# Patient Record
Sex: FEMALE | Race: BLACK OR AFRICAN AMERICAN | Employment: UNEMPLOYED | URBAN - METROPOLITAN AREA
[De-identification: names, ages, dates, MRNs, and addresses within clinical notes are randomized per-mention and may not be internally consistent; named-entity substitution may affect disease eponyms.]

---

## 2017-05-01 ENCOUNTER — APPOINTMENT (OUTPATIENT)
Dept: GENERAL RADIOLOGY | Age: 62
DRG: 100 | End: 2017-05-01
Attending: EMERGENCY MEDICINE
Payer: SELF-PAY

## 2017-05-01 ENCOUNTER — APPOINTMENT (OUTPATIENT)
Dept: CT IMAGING | Age: 62
DRG: 100 | End: 2017-05-01
Attending: EMERGENCY MEDICINE
Payer: SELF-PAY

## 2017-05-01 ENCOUNTER — HOSPITAL ENCOUNTER (INPATIENT)
Age: 62
LOS: 7 days | Discharge: HOME HEALTH | DRG: 100 | End: 2017-05-08
Attending: EMERGENCY MEDICINE | Admitting: HOSPITALIST
Payer: SELF-PAY

## 2017-05-01 DIAGNOSIS — G40.311 INTRACTABLE GENERALIZED IDIOPATHIC EPILEPSY WITH STATUS EPILEPTICUS (HCC): Primary | ICD-10-CM

## 2017-05-01 DIAGNOSIS — E87.20 METABOLIC ACIDOSIS: ICD-10-CM

## 2017-05-01 PROBLEM — J96.00 ACUTE RESPIRATORY FAILURE (HCC): Status: ACTIVE | Noted: 2017-05-01

## 2017-05-01 PROBLEM — G40.901 STATUS EPILEPTICUS (HCC): Status: ACTIVE | Noted: 2017-05-01

## 2017-05-01 LAB
ALBUMIN SERPL BCP-MCNC: 3.5 G/DL (ref 3.4–5)
ALBUMIN/GLOB SERPL: 0.6 {RATIO} (ref 0.8–1.7)
ALP SERPL-CCNC: 115 U/L (ref 45–117)
ALT SERPL-CCNC: 89 U/L (ref 13–56)
AMPHET UR QL SCN: NEGATIVE
ANION GAP BLD CALC-SCNC: 26 MMOL/L (ref 3–18)
ANION GAP BLD CALC-SCNC: 27 MMOL/L (ref 10–20)
ANION GAP BLD CALC-SCNC: 6 MMOL/L (ref 3–18)
APPEARANCE UR: CLEAR
APTT PPP: 23.5 SEC (ref 23–36.4)
ARTERIAL PATENCY WRIST A: YES
ARTERIAL PATENCY WRIST A: YES
AST SERPL W P-5'-P-CCNC: 87 U/L (ref 15–37)
ATRIAL RATE: 115 BPM
BACTERIA URNS QL MICRO: NEGATIVE /HPF
BARBITURATES UR QL SCN: NEGATIVE
BASE DEFICIT BLD-SCNC: 1 MMOL/L
BASE DEFICIT BLD-SCNC: 22 MMOL/L
BASOPHILS # BLD AUTO: 0 K/UL (ref 0–0.06)
BASOPHILS # BLD: 0 % (ref 0–2)
BDY SITE: ABNORMAL
BDY SITE: ABNORMAL
BENZODIAZ UR QL: POSITIVE
BILIRUB SERPL-MCNC: 0.3 MG/DL (ref 0.2–1)
BILIRUB UR QL: NEGATIVE
BODY TEMPERATURE: 37.4
BODY TEMPERATURE: 99.1
BUN BLD-MCNC: 7 MG/DL (ref 7–18)
BUN SERPL-MCNC: 6 MG/DL (ref 7–18)
BUN SERPL-MCNC: 7 MG/DL (ref 7–18)
BUN/CREAT SERPL: 4 (ref 12–20)
BUN/CREAT SERPL: 5 (ref 12–20)
CA-I BLD-MCNC: 1.09 MMOL/L (ref 1.12–1.32)
CALCIUM SERPL-MCNC: 7.2 MG/DL (ref 8.5–10.1)
CALCIUM SERPL-MCNC: 9.4 MG/DL (ref 8.5–10.1)
CALCULATED P AXIS, ECG09: 54 DEGREES
CALCULATED R AXIS, ECG10: 2 DEGREES
CALCULATED T AXIS, ECG11: 72 DEGREES
CANNABINOIDS UR QL SCN: POSITIVE
CHLORIDE BLD-SCNC: 106 MMOL/L (ref 100–108)
CHLORIDE SERPL-SCNC: 106 MMOL/L (ref 100–108)
CHLORIDE SERPL-SCNC: 109 MMOL/L (ref 100–108)
CK MB CFR SERPL CALC: 0.7 % (ref 0–4)
CK MB CFR SERPL CALC: 1.1 % (ref 0–4)
CK MB SERPL-MCNC: 1.6 NG/ML (ref 5–25)
CK MB SERPL-MCNC: 3.8 NG/ML (ref 5–25)
CK SERPL-CCNC: 214 U/L (ref 26–192)
CK SERPL-CCNC: 351 U/L (ref 26–192)
CO2 BLD-SCNC: 12 MMOL/L (ref 19–24)
CO2 SERPL-SCNC: 21 MMOL/L (ref 21–32)
CO2 SERPL-SCNC: 9 MMOL/L (ref 21–32)
COCAINE UR QL SCN: NEGATIVE
COLOR UR: YELLOW
CREAT SERPL-MCNC: 1.28 MG/DL (ref 0.6–1.3)
CREAT SERPL-MCNC: 1.34 MG/DL (ref 0.6–1.3)
CREAT UR-MCNC: 0.9 MG/DL (ref 0.6–1.3)
DIAGNOSIS, 93000: NORMAL
DIFFERENTIAL METHOD BLD: ABNORMAL
EOSINOPHIL # BLD: 0.1 K/UL (ref 0–0.4)
EOSINOPHIL NFR BLD: 1 % (ref 0–5)
ERYTHROCYTE [DISTWIDTH] IN BLOOD BY AUTOMATED COUNT: 14.2 % (ref 11.6–14.5)
EST. AVERAGE GLUCOSE BLD GHB EST-MCNC: 105 MG/DL
GAS FLOW.O2 O2 DELIVERY SYS: ABNORMAL L/MIN
GAS FLOW.O2 O2 DELIVERY SYS: ABNORMAL L/MIN
GAS FLOW.O2 SETTING OXYMISER: 10 L/M
GAS FLOW.O2 SETTING OXYMISER: 16 BPM
GLOBULIN SER CALC-MCNC: 5.5 G/DL (ref 2–4)
GLUCOSE BLD STRIP.AUTO-MCNC: 106 MG/DL (ref 70–110)
GLUCOSE BLD STRIP.AUTO-MCNC: 294 MG/DL (ref 74–106)
GLUCOSE BLD STRIP.AUTO-MCNC: 95 MG/DL (ref 70–110)
GLUCOSE SERPL-MCNC: 279 MG/DL (ref 74–99)
GLUCOSE SERPL-MCNC: 659 MG/DL (ref 74–99)
GLUCOSE UR STRIP.AUTO-MCNC: 250 MG/DL
HBA1C MFR BLD: 5.3 % (ref 4.2–5.6)
HCO3 BLD-SCNC: 24.3 MMOL/L (ref 22–26)
HCO3 BLD-SCNC: 7.6 MMOL/L (ref 22–26)
HCT VFR BLD AUTO: 47.2 % (ref 35–45)
HCT VFR BLD CALC: 52 % (ref 36–49)
HDSCOM,HDSCOM: ABNORMAL
HGB BLD-MCNC: 15.5 G/DL (ref 12–16)
HGB BLD-MCNC: 17.7 G/DL (ref 12–16)
HGB UR QL STRIP: ABNORMAL
HYALINE CASTS URNS QL MICRO: ABNORMAL /LPF (ref 0–2)
INR PPP: 1.2 (ref 0.8–1.2)
INSPIRATION.DURATION SETTING TIME VENT: 0.75 SEC
KETONES UR QL STRIP.AUTO: NEGATIVE MG/DL
LACTATE SERPL-SCNC: 2.5 MMOL/L (ref 0.4–2)
LACTATE SERPL-SCNC: 3.3 MMOL/L (ref 0.4–2)
LEUKOCYTE ESTERASE UR QL STRIP.AUTO: NEGATIVE
LYMPHOCYTES # BLD AUTO: 34 % (ref 21–52)
LYMPHOCYTES # BLD: 3.1 K/UL (ref 0.9–3.6)
MAGNESIUM SERPL-MCNC: 2.5 MG/DL (ref 1.6–2.6)
MCH RBC QN AUTO: 31.8 PG (ref 24–34)
MCHC RBC AUTO-ENTMCNC: 32.8 G/DL (ref 31–37)
MCV RBC AUTO: 96.9 FL (ref 74–97)
METHADONE UR QL: NEGATIVE
MONOCYTES # BLD: 0.3 K/UL (ref 0.05–1.2)
MONOCYTES NFR BLD AUTO: 3 % (ref 3–10)
MUCOUS THREADS URNS QL MICRO: ABNORMAL /LPF
NEUTS SEG # BLD: 5.5 K/UL (ref 1.8–8)
NEUTS SEG NFR BLD AUTO: 62 % (ref 40–73)
NITRITE UR QL STRIP.AUTO: NEGATIVE
O2/TOTAL GAS SETTING VFR VENT: 1 %
O2/TOTAL GAS SETTING VFR VENT: 1 %
OPIATES UR QL: POSITIVE
P-R INTERVAL, ECG05: 124 MS
PCO2 BLD: 25.8 MMHG (ref 35–45)
PCO2 BLD: 41.2 MMHG (ref 35–45)
PCP UR QL: NEGATIVE
PEEP RESPIRATORY: 7 CMH2O
PH BLD: 7.08 [PH] (ref 7.35–7.45)
PH BLD: 7.38 [PH] (ref 7.35–7.45)
PH UR STRIP: 5.5 [PH] (ref 5–8)
PIP ISTAT,IPIP: 23
PLATELET # BLD AUTO: 170 K/UL (ref 135–420)
PMV BLD AUTO: 10.3 FL (ref 9.2–11.8)
PO2 BLD: 214 MMHG (ref 80–100)
PO2 BLD: 451 MMHG (ref 80–100)
POTASSIUM BLD-SCNC: 3.9 MMOL/L (ref 3.5–5.5)
POTASSIUM SERPL-SCNC: 3.9 MMOL/L (ref 3.5–5.5)
POTASSIUM SERPL-SCNC: 6.2 MMOL/L (ref 3.5–5.5)
PROT SERPL-MCNC: 9 G/DL (ref 6.4–8.2)
PROT UR STRIP-MCNC: 30 MG/DL
PROTHROMBIN TIME: 14.7 SEC (ref 11.5–15.2)
Q-T INTERVAL, ECG07: 354 MS
QRS DURATION, ECG06: 80 MS
QTC CALCULATION (BEZET), ECG08: 489 MS
RBC # BLD AUTO: 4.87 M/UL (ref 4.2–5.3)
RBC #/AREA URNS HPF: ABNORMAL /HPF (ref 0–5)
SALICYLATES SERPL-MCNC: 7.7 MG/DL (ref 2.8–20)
SAO2 % BLD: 100 % (ref 92–97)
SAO2 % BLD: 99 % (ref 92–97)
SERVICE CMNT-IMP: ABNORMAL
SERVICE CMNT-IMP: ABNORMAL
SODIUM BLD-SCNC: 140 MMOL/L (ref 136–145)
SODIUM SERPL-SCNC: 136 MMOL/L (ref 136–145)
SODIUM SERPL-SCNC: 141 MMOL/L (ref 136–145)
SP GR UR REFRACTOMETRY: 1.01 (ref 1–1.03)
SPECIMEN TYPE: ABNORMAL
SPECIMEN TYPE: ABNORMAL
TOTAL RESP. RATE, ITRR: 16
TOTAL RESP. RATE, ITRR: 21
TROPONIN I BLD-MCNC: <0.04 NG/ML (ref 0–0.08)
TROPONIN I SERPL-MCNC: 0.04 NG/ML (ref 0–0.04)
TROPONIN I SERPL-MCNC: 1.03 NG/ML (ref 0–0.04)
TSH SERPL DL<=0.05 MIU/L-ACNC: 1.36 UIU/ML (ref 0.36–3.74)
UROBILINOGEN UR QL STRIP.AUTO: 0.2 EU/DL (ref 0.2–1)
VENTILATION MODE VENT: ABNORMAL
VENTRICULAR RATE, ECG03: 115 BPM
VOLUME CONTROL PLUS IVLCP: YES
VT SETTING VENT: 400 ML
WBC # BLD AUTO: 9.1 K/UL (ref 4.6–13.2)
WBC URNS QL MICRO: ABNORMAL /HPF (ref 0–4)
YEAST URNS QL MICRO: ABNORMAL

## 2017-05-01 PROCEDURE — 82962 GLUCOSE BLOOD TEST: CPT

## 2017-05-01 PROCEDURE — 74011250636 HC RX REV CODE- 250/636

## 2017-05-01 PROCEDURE — 83605 ASSAY OF LACTIC ACID: CPT | Performed by: PHYSICIAN ASSISTANT

## 2017-05-01 PROCEDURE — 80307 DRUG TEST PRSMV CHEM ANLYZR: CPT | Performed by: EMERGENCY MEDICINE

## 2017-05-01 PROCEDURE — 74011250636 HC RX REV CODE- 250/636: Performed by: EMERGENCY MEDICINE

## 2017-05-01 PROCEDURE — 77030020263 HC SOL INJ SOD CL0.9% LFCR 1000ML

## 2017-05-01 PROCEDURE — 99285 EMERGENCY DEPT VISIT HI MDM: CPT

## 2017-05-01 PROCEDURE — 36415 COLL VENOUS BLD VENIPUNCTURE: CPT | Performed by: PHYSICIAN ASSISTANT

## 2017-05-01 PROCEDURE — 85025 COMPLETE CBC W/AUTO DIFF WBC: CPT | Performed by: EMERGENCY MEDICINE

## 2017-05-01 PROCEDURE — 71010 XR CHEST PORT: CPT

## 2017-05-01 PROCEDURE — 51702 INSERT TEMP BLADDER CATH: CPT

## 2017-05-01 PROCEDURE — 5A1945Z RESPIRATORY VENTILATION, 24-96 CONSECUTIVE HOURS: ICD-10-PCS | Performed by: EMERGENCY MEDICINE

## 2017-05-01 PROCEDURE — 87070 CULTURE OTHR SPECIMN AEROBIC: CPT | Performed by: PHYSICIAN ASSISTANT

## 2017-05-01 PROCEDURE — 87077 CULTURE AEROBIC IDENTIFY: CPT | Performed by: PHYSICIAN ASSISTANT

## 2017-05-01 PROCEDURE — 83735 ASSAY OF MAGNESIUM: CPT | Performed by: EMERGENCY MEDICINE

## 2017-05-01 PROCEDURE — 36600 WITHDRAWAL OF ARTERIAL BLOOD: CPT

## 2017-05-01 PROCEDURE — 74011250636 HC RX REV CODE- 250/636: Performed by: HOSPITALIST

## 2017-05-01 PROCEDURE — 80047 BASIC METABLC PNL IONIZED CA: CPT

## 2017-05-01 PROCEDURE — 96374 THER/PROPH/DIAG INJ IV PUSH: CPT

## 2017-05-01 PROCEDURE — 77030032764 HC GLDSCP STAT GVL VERT -B

## 2017-05-01 PROCEDURE — 96367 TX/PROPH/DG ADDL SEQ IV INF: CPT

## 2017-05-01 PROCEDURE — 93005 ELECTROCARDIOGRAM TRACING: CPT

## 2017-05-01 PROCEDURE — 80177 DRUG SCRN QUAN LEVETIRACETAM: CPT | Performed by: EMERGENCY MEDICINE

## 2017-05-01 PROCEDURE — 87040 BLOOD CULTURE FOR BACTERIA: CPT | Performed by: INTERNAL MEDICINE

## 2017-05-01 PROCEDURE — 84484 ASSAY OF TROPONIN QUANT: CPT | Performed by: EMERGENCY MEDICINE

## 2017-05-01 PROCEDURE — 74011000250 HC RX REV CODE- 250

## 2017-05-01 PROCEDURE — 87086 URINE CULTURE/COLONY COUNT: CPT | Performed by: PHYSICIAN ASSISTANT

## 2017-05-01 PROCEDURE — 82803 BLOOD GASES ANY COMBINATION: CPT

## 2017-05-01 PROCEDURE — 82550 ASSAY OF CK (CPK): CPT | Performed by: EMERGENCY MEDICINE

## 2017-05-01 PROCEDURE — 65610000006 HC RM INTENSIVE CARE

## 2017-05-01 PROCEDURE — 31500 INSERT EMERGENCY AIRWAY: CPT

## 2017-05-01 PROCEDURE — 84443 ASSAY THYROID STIM HORMONE: CPT | Performed by: PSYCHIATRY & NEUROLOGY

## 2017-05-01 PROCEDURE — 94400 HC END TIDAL CO2 RESPONSE CURVE: CPT

## 2017-05-01 PROCEDURE — 80177 DRUG SCRN QUAN LEVETIRACETAM: CPT | Performed by: PSYCHIATRY & NEUROLOGY

## 2017-05-01 PROCEDURE — 80307 DRUG TEST PRSMV CHEM ANLYZR: CPT | Performed by: PHYSICIAN ASSISTANT

## 2017-05-01 PROCEDURE — 74011000258 HC RX REV CODE- 258: Performed by: EMERGENCY MEDICINE

## 2017-05-01 PROCEDURE — 0BH17EZ INSERTION OF ENDOTRACHEAL AIRWAY INTO TRACHEA, VIA NATURAL OR ARTIFICIAL OPENING: ICD-10-PCS | Performed by: EMERGENCY MEDICINE

## 2017-05-01 PROCEDURE — 80053 COMPREHEN METABOLIC PANEL: CPT | Performed by: EMERGENCY MEDICINE

## 2017-05-01 PROCEDURE — 95951 HC EEG EPILEPSY MONITOR > 16: CPT

## 2017-05-01 PROCEDURE — 83036 HEMOGLOBIN GLYCOSYLATED A1C: CPT | Performed by: PHYSICIAN ASSISTANT

## 2017-05-01 PROCEDURE — A6213 FOAM DRG >16<=48 SQ IN W/BDR: HCPCS

## 2017-05-01 PROCEDURE — 70450 CT HEAD/BRAIN W/O DYE: CPT

## 2017-05-01 PROCEDURE — 74011250637 HC RX REV CODE- 250/637: Performed by: HOSPITALIST

## 2017-05-01 PROCEDURE — 80048 BASIC METABOLIC PNL TOTAL CA: CPT | Performed by: PHYSICIAN ASSISTANT

## 2017-05-01 PROCEDURE — 74011000250 HC RX REV CODE- 250: Performed by: EMERGENCY MEDICINE

## 2017-05-01 PROCEDURE — 77030030062

## 2017-05-01 PROCEDURE — 85610 PROTHROMBIN TIME: CPT | Performed by: EMERGENCY MEDICINE

## 2017-05-01 PROCEDURE — 96365 THER/PROPH/DIAG IV INF INIT: CPT

## 2017-05-01 PROCEDURE — 85730 THROMBOPLASTIN TIME PARTIAL: CPT | Performed by: EMERGENCY MEDICINE

## 2017-05-01 PROCEDURE — 87186 SC STD MICRODIL/AGAR DIL: CPT | Performed by: PHYSICIAN ASSISTANT

## 2017-05-01 PROCEDURE — 81001 URINALYSIS AUTO W/SCOPE: CPT | Performed by: EMERGENCY MEDICINE

## 2017-05-01 PROCEDURE — 94002 VENT MGMT INPAT INIT DAY: CPT

## 2017-05-01 PROCEDURE — 77030034848

## 2017-05-01 RX ORDER — SODIUM CHLORIDE 9 MG/ML
150 INJECTION, SOLUTION INTRAVENOUS CONTINUOUS
Status: DISCONTINUED | OUTPATIENT
Start: 2017-05-01 | End: 2017-05-02

## 2017-05-01 RX ORDER — PROPOFOL 10 MG/ML
5-50 VIAL (ML) INTRAVENOUS
Status: DISCONTINUED | OUTPATIENT
Start: 2017-05-01 | End: 2017-05-03

## 2017-05-01 RX ORDER — DEXTROSE, SODIUM CHLORIDE, AND POTASSIUM CHLORIDE 5; .45; .15 G/100ML; G/100ML; G/100ML
125 INJECTION INTRAVENOUS CONTINUOUS
Status: DISCONTINUED | OUTPATIENT
Start: 2017-05-01 | End: 2017-05-01

## 2017-05-01 RX ORDER — SODIUM BICARBONATE 84 MG/ML
50 INJECTION, SOLUTION INTRAVENOUS
Status: DISCONTINUED | OUTPATIENT
Start: 2017-05-01 | End: 2017-05-01

## 2017-05-01 RX ORDER — CHLORHEXIDINE GLUCONATE 1.2 MG/ML
10 RINSE ORAL EVERY 12 HOURS
Status: DISCONTINUED | OUTPATIENT
Start: 2017-05-01 | End: 2017-05-04

## 2017-05-01 RX ORDER — SODIUM BICARBONATE 1 MEQ/ML
50 SYRINGE (ML) INTRAVENOUS
Status: COMPLETED | OUTPATIENT
Start: 2017-05-01 | End: 2017-05-01

## 2017-05-01 RX ORDER — SUCCINYLCHOLINE CHLORIDE 20 MG/ML
100 INJECTION INTRAMUSCULAR; INTRAVENOUS
Status: COMPLETED | OUTPATIENT
Start: 2017-05-01 | End: 2017-05-01

## 2017-05-01 RX ORDER — MIDAZOLAM HYDROCHLORIDE 1 MG/ML
INJECTION, SOLUTION INTRAMUSCULAR; INTRAVENOUS
Status: COMPLETED
Start: 2017-05-01 | End: 2017-05-01

## 2017-05-01 RX ORDER — MIDAZOLAM HYDROCHLORIDE 1 MG/ML
4 INJECTION, SOLUTION INTRAMUSCULAR; INTRAVENOUS
Status: COMPLETED | OUTPATIENT
Start: 2017-05-01 | End: 2017-05-01

## 2017-05-01 RX ORDER — SODIUM BICARBONATE 1 MEQ/ML
SYRINGE (ML) INTRAVENOUS
Status: COMPLETED
Start: 2017-05-01 | End: 2017-05-01

## 2017-05-01 RX ORDER — LEVETIRACETAM 15 MG/ML
1500 INJECTION INTRAVASCULAR EVERY 12 HOURS
Status: DISCONTINUED | OUTPATIENT
Start: 2017-05-01 | End: 2017-05-03

## 2017-05-01 RX ORDER — ENOXAPARIN SODIUM 100 MG/ML
40 INJECTION SUBCUTANEOUS EVERY 24 HOURS
Status: DISCONTINUED | OUTPATIENT
Start: 2017-05-01 | End: 2017-05-08 | Stop reason: HOSPADM

## 2017-05-01 RX ORDER — SODIUM CHLORIDE 9 MG/ML
500 INJECTION, SOLUTION INTRAVENOUS ONCE
Status: COMPLETED | OUTPATIENT
Start: 2017-05-02 | End: 2017-05-02

## 2017-05-01 RX ORDER — DEXTROSE 50 % IN WATER (D50W) INTRAVENOUS SYRINGE
25-50 AS NEEDED
Status: DISCONTINUED | OUTPATIENT
Start: 2017-05-01 | End: 2017-05-01 | Stop reason: SDUPTHER

## 2017-05-01 RX ORDER — ZOLPIDEM TARTRATE 5 MG/1
5 TABLET ORAL
Status: DISCONTINUED | OUTPATIENT
Start: 2017-05-01 | End: 2017-05-01

## 2017-05-01 RX ORDER — DEXTROSE 50 % IN WATER (D50W) INTRAVENOUS SYRINGE
25-50 AS NEEDED
Status: DISCONTINUED | OUTPATIENT
Start: 2017-05-01 | End: 2017-05-08 | Stop reason: HOSPADM

## 2017-05-01 RX ORDER — ETOMIDATE 2 MG/ML
20 INJECTION INTRAVENOUS ONCE
Status: COMPLETED | OUTPATIENT
Start: 2017-05-01 | End: 2017-05-01

## 2017-05-01 RX ORDER — INSULIN LISPRO 100 [IU]/ML
INJECTION, SOLUTION INTRAVENOUS; SUBCUTANEOUS EVERY 6 HOURS
Status: DISCONTINUED | OUTPATIENT
Start: 2017-05-01 | End: 2017-05-01

## 2017-05-01 RX ORDER — MAGNESIUM SULFATE 100 %
4 CRYSTALS MISCELLANEOUS AS NEEDED
Status: DISCONTINUED | OUTPATIENT
Start: 2017-05-01 | End: 2017-05-08 | Stop reason: HOSPADM

## 2017-05-01 RX ORDER — PROPOFOL 10 MG/ML
100 INJECTION, EMULSION INTRAVENOUS
Status: COMPLETED | OUTPATIENT
Start: 2017-05-01 | End: 2017-05-01

## 2017-05-01 RX ORDER — ACETAMINOPHEN 650 MG/1
650 SUPPOSITORY RECTAL
Status: DISCONTINUED | OUTPATIENT
Start: 2017-05-01 | End: 2017-05-02 | Stop reason: SDUPTHER

## 2017-05-01 RX ADMIN — ETOMIDATE 20 MG: 2 INJECTION, SOLUTION INTRAVENOUS at 11:17

## 2017-05-01 RX ADMIN — LEVETIRACETAM 1500 MG: 15 INJECTION INTRAVENOUS at 11:19

## 2017-05-01 RX ADMIN — LEVETIRACETAM 1500 MG: 15 INJECTION INTRAVENOUS at 23:29

## 2017-05-01 RX ADMIN — MIDAZOLAM HYDROCHLORIDE 4 MG: 1 INJECTION, SOLUTION INTRAMUSCULAR; INTRAVENOUS at 12:02

## 2017-05-01 RX ADMIN — SODIUM CHLORIDE 150 ML/HR: 900 INJECTION, SOLUTION INTRAVENOUS at 12:58

## 2017-05-01 RX ADMIN — CHLORHEXIDINE GLUCONATE 10 ML: 1.2 RINSE ORAL at 20:37

## 2017-05-01 RX ADMIN — MIDAZOLAM HYDROCHLORIDE 2 MG/HR: 5 INJECTION, SOLUTION INTRAMUSCULAR; INTRAVENOUS at 12:30

## 2017-05-01 RX ADMIN — ACETAMINOPHEN 650 MG: 650 SUPPOSITORY RECTAL at 21:10

## 2017-05-01 RX ADMIN — Medication 50 MEQ: at 11:14

## 2017-05-01 RX ADMIN — SODIUM CHLORIDE 1000 ML: 9 INJECTION, SOLUTION INTRAVENOUS at 11:08

## 2017-05-01 RX ADMIN — SUCCINYLCHOLINE CHLORIDE 100 MG: 20 INJECTION, SOLUTION INTRAMUSCULAR; INTRAVENOUS at 11:18

## 2017-05-01 RX ADMIN — SODIUM CHLORIDE 500 ML/HR: 900 INJECTION, SOLUTION INTRAVENOUS at 23:29

## 2017-05-01 RX ADMIN — PROPOFOL 100 MG: 10 INJECTION, EMULSION INTRAVENOUS at 11:15

## 2017-05-01 RX ADMIN — PROPOFOL 25 MCG/KG/MIN: 10 INJECTION, EMULSION INTRAVENOUS at 11:47

## 2017-05-01 RX ADMIN — DEXTROSE MONOHYDRATE, SODIUM CHLORIDE, AND POTASSIUM CHLORIDE 125 ML/HR: 50; 4.5; 1.49 INJECTION, SOLUTION INTRAVENOUS at 16:24

## 2017-05-01 RX ADMIN — PROPOFOL 30 MCG/KG/MIN: 10 INJECTION, EMULSION INTRAVENOUS at 20:06

## 2017-05-01 RX ADMIN — ENOXAPARIN SODIUM 40 MG: 40 INJECTION SUBCUTANEOUS at 16:25

## 2017-05-01 RX ADMIN — SODIUM BICARBONATE 50 MEQ: 84 INJECTION INTRAVENOUS at 11:14

## 2017-05-01 NOTE — IP AVS SNAPSHOT
Current Discharge Medication List  
  
START taking these medications Dose & Instructions Dispensing Information Comments Morning Noon Evening Bedtime  
 levETIRAcetam 750 mg tablet Commonly known as:  KEPPRA Your last dose was:  8:46 am  
Your next dose is:  9:00 pm  
   
 Dose:  1500 mg Take 2 Tabs by mouth two (2) times a day. Quantity:  120 Tab Refills:  0 CONTINUE these medications which have NOT CHANGED Dose & Instructions Dispensing Information Comments Morning Noon Evening Bedtime  
 amLODIPine 5 mg tablet Commonly known as:  Sharion Alpine Dose:  5 mg Take 5 mg by mouth daily. Refills:  0  
     
   
   
   
  
 atorvastatin 20 mg tablet Commonly known as:  LIPITOR Dose:  20 mg Take 1 Tab by mouth nightly. Quantity:  30 Tab Refills:  0  
     
   
   
   
  
 CALCIUM-VITAMIN D PO Dose:  1200 mg Take 1,200 mg by mouth. Refills:  0  
     
   
   
   
  
 famotidine 20 mg tablet Commonly known as:  PEPCID Dose:  20 mg Take 20 mg by mouth two (2) times a day. Refills:  0  
     
   
   
   
  
 FISH -1,200 mg Cpdr  
Generic drug:  Omega-3 Fatty Acids-Fish Oil Dose:  1200 mg Take 1,200 mg by mouth. Refills:  0  
     
   
   
   
  
 folic acid 1 mg tablet Commonly known as:  Google Dose:  1 mg Take 1 mg by mouth daily. Refills:  0  
     
   
   
   
  
 HUMIRA PEN 40 mg/0.8 mL Pnkt Generic drug:  adalimumab Dose:  40 mg  
40 mg by SubCUTAneous route. Indications: RHEUMATOID ARTHRITIS Refills:  0  
     
   
   
   
  
 loratadine 10 mg tablet Commonly known as:  Izzy Jorge Dose:  10 mg Take 10 mg by mouth daily. Refills:  0  
     
   
   
   
  
 OLANZapine 5 mg tablet Commonly known as:  ZyPREXA Dose:  5 mg Take 5 mg by mouth nightly. Refills:  0  
     
   
   
   
  
 ondansetron hcl 4 mg tablet Commonly known as:  ZOFRAN (AS HYDROCHLORIDE) Dose:  4 mg Take 1 Tab by mouth every eight (8) hours as needed for Nausea. Quantity:  30 Tab Refills:  0  
     
   
   
   
  
 oxyCODONE-acetaminophen 5-325 mg per tablet Commonly known as:  PERCOCET Take 1 tablet every 4-6 hours as needed for pain control. If you were instructed to try over the counter ibuprofen or tylenol, only take the percocet for pain not controlled with the over the counter medication. Quantity:  10 Tab Refills:  0  
     
   
   
   
  
 ZOLOFT 100 mg tablet Generic drug:  sertraline Dose:  100 mg Take 100 mg by mouth daily. Refills:  0 Where to Get Your Medications Information on where to get these meds will be given to you by the nurse or doctor. ! Ask your nurse or doctor about these medications  
  levETIRAcetam 750 mg tablet

## 2017-05-01 NOTE — PROGRESS NOTES
Rec'd pt on transport from ED following intubation during seizure activity/non responsiveness  Pt ETT=23 upper lip, 24 appears outside - ETT already adj following xray post intubation  Pt resting on vent - add bite block  Vent settings:  AC VC+, Fl=629, rate=16, Fio2=40%, peep=7  Suction upon arrival orally & via ETT:  Large amt white/clear secretions    Resp Plan:  Monitor pt on vent, wean and/or titrate to maintain pt comfort & sats. Resp Goal:  Wean pt from vent.

## 2017-05-01 NOTE — PROGRESS NOTES
Patient arrived via EMS seizin-Patient intubated for airway protection d/t seizures. 1125- Patient transported to CT and back on vent and monitor without complications. 1237- Et tube in right mainstem [email or web address], re -positoned to [email or web address] s/p CT per Dr Suleiman Rivera.

## 2017-05-01 NOTE — ROUTINE PROCESS
1500: Patient arrived to NSICU. Report received from [facility]. Patient on versed gtt and propofol gtt. 1730: EEG at the bedside. 1813: Patients temperature 100.4 F. Paged Dr. Sandra Cuevas. Bedside shift change report given to Liang Bucio RN (oncoming nurse) by Dearmaximo Wall RN (offgoing nurse). Report included the following information SBAR, Kardex, ED Summary, Intake/Output, MAR, Accordion, Recent Results, Med Rec Status and Cardiac Rhythm NSR.

## 2017-05-01 NOTE — IP AVS SNAPSHOT
Terry Lasso 
 
 
 [address] [place] [address] 
[phone] Patient: Joshua Quiñones MRN: [id number] OMS:0/54/9563 You are allergic to the following Allergen Reactions Aspirin Swelling Penicillins Unknown (comments) Recent Documentation Height Weight BMI OB Status Smoking Status 1.626 m 79.8 kg 30.21 kg/m2 Menopause Current Every Day Smoker Emergency Contacts Name Discharge Info Relation Home Work Mobile [address] A  Spouse [3] [phone] About your hospitalization You were admitted on:  May 1, 2017 You last received care in the:  [address] You were discharged on: May 8, 2017 Unit phone number:  [phone] Why you were hospitalized Your primary diagnosis was:  Status Epilepticus (Hcc) Your diagnoses also included:  Acute Respiratory Failure (Hcc), Benign Hypertensive Heart Disease Without Heart Failure, Diabetes (Hcc), Dyslipidemia Providers Seen During Your Hospitalizations Provider Role Specialty Primary office phone Kerry Chen MD Attending Provider Emergency Medicine [phone] July Baires MD Attending Provider Internal Medicine [phone] Abdirashid Sherwood MD Attending Provider Internal Medicine [phone] Gabe Muir MD Attending Provider Internal Medicine [phone] Farideh Dexter MD Attending Provider [facility] [phone] Your Primary Care Physician (PCP) Primary Care Physician Office Phone Office Fax KAVON WRIGHT ** None ** ** None ** Follow-up Information Follow up With Details Comments Contact Info Benny Soni MD In 1 week Follow-up visit Current Discharge Medication List  
  
START taking these medications Dose & Instructions Dispensing Information Comments Morning Noon Evening Bedtime levETIRAcetam 750 mg tablet Commonly known as:  KEPPRA Your last dose was: Your next dose is:    
   
   
 Dose:  1500 mg Take 2 Tabs by mouth two (2) times a day. Quantity:  120 Tab Refills:  0 CONTINUE these medications which have NOT CHANGED Dose & Instructions Dispensing Information Comments Morning Noon Evening Bedtime  
 amLODIPine 5 mg tablet Commonly known as:  Lidya Rice Your last dose was: Your next dose is:    
   
   
 Dose:  5 mg Take 5 mg by mouth daily. Refills:  0  
     
   
   
   
  
 atorvastatin 20 mg tablet Commonly known as:  LIPITOR Your last dose was: Your next dose is:    
   
   
 Dose:  20 mg Take 1 Tab by mouth nightly. Quantity:  30 Tab Refills:  0  
     
   
   
   
  
 CALCIUM-VITAMIN D PO Your last dose was: Your next dose is:    
   
   
 Dose:  1200 mg Take 1,200 mg by mouth. Refills:  0  
     
   
   
   
  
 famotidine 20 mg tablet Commonly known as:  PEPCID Your last dose was: Your next dose is:    
   
   
 Dose:  20 mg Take 20 mg by mouth two (2) times a day. Refills:  0  
     
   
   
   
  
 FISH -1,200 mg Cpdr  
Generic drug:  Omega-3 Fatty Acids-Fish Oil Your last dose was: Your next dose is:    
   
   
 Dose:  1200 mg Take 1,200 mg by mouth. Refills:  0  
     
   
   
   
  
 folic acid 1 mg tablet Commonly known as:  Google Your last dose was: Your next dose is:    
   
   
 Dose:  1 mg Take 1 mg by mouth daily. Refills:  0  
     
   
   
   
  
 HUMIRA PEN 40 mg/0.8 mL Pnkt Generic drug:  adalimumab Your last dose was: Your next dose is:    
   
   
 Dose:  40 mg  
40 mg by SubCUTAneous route. Indications: RHEUMATOID ARTHRITIS Refills:  0  
     
   
   
   
  
 loratadine 10 mg tablet Commonly known as:  Carrillo Rebollar Your last dose was: Your next dose is:    
   
   
 Dose:  10 mg Take 10 mg by mouth daily. Refills:  0  
     
   
   
   
  
 OLANZapine 5 mg tablet Commonly known as:  ZyPREXA Your last dose was: Your next dose is:    
   
   
 Dose:  5 mg Take 5 mg by mouth nightly. Refills:  0  
     
   
   
   
  
 ondansetron hcl 4 mg tablet Commonly known as:  ZOFRAN (AS HYDROCHLORIDE) Your last dose was: Your next dose is:    
   
   
 Dose:  4 mg Take 1 Tab by mouth every eight (8) hours as needed for Nausea. Quantity:  30 Tab Refills:  0  
     
   
   
   
  
 oxyCODONE-acetaminophen 5-325 mg per tablet Commonly known as:  PERCOCET Your last dose was: Your next dose is: Take 1 tablet every 4-6 hours as needed for pain control. If you were instructed to try over the counter ibuprofen or tylenol, only take the percocet for pain not controlled with the over the counter medication. Quantity:  10 Tab Refills:  0  
     
   
   
   
  
 ZOLOFT 100 mg tablet Generic drug:  sertraline Your last dose was: Your next dose is:    
   
   
 Dose:  100 mg Take 100 mg by mouth daily. Refills:  0 Where to Get Your Medications Information on where to get these meds will be given to you by the nurse or doctor. ! Ask your nurse or doctor about these medications  
  levETIRAcetam 750 mg tablet Discharge Instructions DISCHARGE SUMMARY from Nurse The following personal items are in your possession at time of discharge: 
 
Dental Appliances: None Visual Aid: Glasses, At home Home Medications: None Jewelry: None Clothing: None Other Valuables: None PATIENT INSTRUCTIONS: 
 
 
F-face looks uneven A-arms unable to move or move unevenly S-speech slurred or non-existent T-time-call 911 as soon as signs and symptoms begin-DO NOT go Back to bed or wait to see if you get better-TIME IS BRAIN. Warning Signs of HEART ATTACK Call 911 if you have these symptoms: 
? Chest discomfort. Most heart attacks involve discomfort in the center of the chest that lasts more than a few minutes, or that goes away and comes back. It can feel like uncomfortable pressure, squeezing, fullness, or pain. ? Discomfort in other areas of the upper body. Symptoms can include pain or discomfort in one or both arms, the back, neck, jaw, or stomach. ? Shortness of breath with or without chest discomfort. ? Other signs may include breaking out in a cold sweat, nausea, or lightheadedness. Don't wait more than five minutes to call [address]! Fast action can save your life. Calling 911 is almost always the fastest way to get lifesaving treatment. Emergency Medical Services staff can begin treatment when they arrive  up to an hour sooner than if someone gets to the hospital by car. The discharge information has been reviewed with the patient and spouse. The patient and spouse verbalized understanding. Discharge medications reviewed with the patient and spouse and appropriate educational materials and side effects teaching were provided. [employer] Activation Thank you for requesting access to [employer]. Please follow the instructions below to securely access and download your online medical record. [employer] allows you to send messages to your doctor, view your test results, renew your prescriptions, schedule appointments, and more. How Do I Sign Up? 1. In your internet browser, go to [email or web address]. 2. Click on the First Time User? Click Here link in the Sign In box.  You will see the New Member Sign Up page. 3. Enter your [employer] Access Code exactly as it appears below. You will not need to use this code after youve completed the sign-up process. If you do not sign up before the expiration date, you must request a new code. [employer] Access Code: [email or web address] Expires: 2017 10:46 AM (This is the date your [employer] access code will ) 4. Enter the last four digits of your Social Security Number (xxxx) and Date of Birth (mm/dd/yyyy) as indicated and click Submit. You will be taken to the next sign-up page. 5. Create a [employer] ID. This will be your [employer] login ID and cannot be changed, so think of one that is secure and easy to remember. 6. Create a [employer] password. You can change your password at any time. 7. Enter your Password Reset Question and Answer. This can be used at a later time if you forget your password. 8. Enter your e-mail address. You will receive e-mail notification when new information is available in [address] 9. Click Sign Up. You can now view and download portions of your medical record. 10. Click the Download Summary menu link to download a portable copy of your medical information. Additional Information If you have questions, please visit the Frequently Asked Questions section of the [employer] website at [email or web address] Remember, [employer] is NOT to be used for urgent needs. For medical emergencies, dial 911. Patient armband removed and shredded Discharge Instructions Attachments/References NON-EPILEPTIC SEIZURE: GENERAL INFO (ENGLISH) Discharge Orders None Introducing [facility]! [facility] introduces [employer] patient portal. Now you can access parts of your medical record, email your doctor's office, and request medication refills online. 1. In your internet browser, go to [email or web address] 2. Click on the First Time User? Click Here link in the Sign In box. You will see the New Member Sign Up page. 3. Enter your [employer] Access Code exactly as it appears below. You will not need to use this code after youve completed the sign-up process. If you do not sign up before the expiration date, you must request a new code. · [employer] Access Code: [email or web address] Expires: 7/31/2017 10:46 AM 
 
4. Enter the last four digits of your Social Security Number (xxxx) and Date of Birth (mm/dd/yyyy) as indicated and click Submit. You will be taken to the next sign-up page. 5. Create a [employer] ID. This will be your [employer] login ID and cannot be changed, so think of one that is secure and easy to remember. 6. Create a [employer] password. You can change your password at any time. 7. Enter your Password Reset Question and Answer. This can be used at a later time if you forget your password. 8. Enter your e-mail address. You will receive e-mail notification when new information is available in [address] 9. Click Sign Up. You can now view and download portions of your medical record. 10. Click the Download Summary menu link to download a portable copy of your medical information. If you have questions, please visit the Frequently Asked Questions section of the [employer] website. Remember, [employer] is NOT to be used for urgent needs. For medical emergencies, dial 911. Now available from your iPhone and Android! General Information Please provide this summary of care documentation to your next provider. Patient Signature:  ____________________________________________________________ Date:  ____________________________________________________________  
  
Kelli Burn Provider Signature:  ____________________________________________________________ Date:  ____________________________________________________________ More Information Learning About Psychogenic Non-Epileptic Seizure What is psychogenic non-epileptic seizure (PNES)? Psychogenic non-epileptic seizures (PNES) don't have a physical cause. They aren't caused by epilepsy. But people with epilepsy also may have PNES. People who have a lot of stress, mental illness, or emotional trauma may be more likely to have PNES. Even though PNES doesn't have a physical cause, it is a real condition. The seizures can be scary. And not knowing why you're having them can be frustrating. What happens during PNES? PNES may look like epileptic seizures. But epileptic seizures usually follow the same pattern every time. With PNES, each episode may be different. During a PNES episode, you may have jerky movements, tingling skin, or problems with coordination. You may notice changes in your vision or sense of smell. Some people have episodes often. Others have them only once in a while. For some people, episodes stop over time. Other people keep having them. How is PNES diagnosed? Your doctor will do tests to find out if you have epilepsy. An EEG test lets your doctor see the electrical activity of your brain. The test is often used to diagnose epilepsy. It helps your doctor know what types of seizures you are having. Your doctor also may do blood tests. PNES can be mistaken for epilepsy at first. As a result, some people with PNES are treated with epilepsy medicines. But most of the time, these medicines don't help. The right diagnosis allows your doctor to give you treatments that will help with the stress and other issues that may be related to PNES. How is PNES treated? Treatment varies with each person. The goals of treatment are to relieve stress and to help you learn ways to cope with difficult areas of your life. You may get medicines or counseling.  
A type of counseling called cognitive-behavioral therapy (CBT) may help with the stress and emotional issues. In CBT, you learn to identify and change thinking styles that may be adding to your stress. CBT is done by licensed mental health providers, such as psychologists, social workers, and therapists. It can be done in one-on-one sessions or in a group setting. Care at home Lowering your stress may help with PNES. Here are some things you can do. How to relax your mind · Write. It may help to write about things that are bothering you. This helps you find out how much stress you feel and what is causing it. When you know this, you can find better ways to cope. · Let your feelings out. Talk, laugh, cry, and express anger when you need to. Talking with friends, family, a counselor, or a member of the clergy about your feelings is a healthy way to relieve stress. · Do something you enjoy. For example, listen to music or go to a movie. Practice your hobby or do volunteer work. · Meditate. This can help you relax, because you are not worrying about what happened before or what may happen in the future. · Do guided imagery. Imagine yourself in any setting that helps you feel calm. You can use audiotapes, books, or a teacher to guide you. How to relax your body · Do something active. Exercise or activity can help reduce stress. Walking is a great way to get started. Even everyday activities such as housecleaning or yard work can help. · Do breathing exercises. For example: 1. From a standing position, bend forward from the waist with your knees slightly bent. Let your arms dangle close to the floor. 2. Breathe in slowly and deeply as you return to a standing position. Roll up slowly, and lift your head last. 
3. Hold your breath for just a few seconds in the standing position. 4. Breathe out slowly and bend forward from the waist. 
· Try yoga or soledad chi. These techniques combine exercise and meditation. You may need some training at first to learn them. Talk to your doctor about whether it is safe to do certain activities, such as drive or swim. Follow-up care is a key part of your treatment and safety. Be sure to make and go to all appointments, and call your doctor if you are having problems. It's also a good idea to know your test results and keep a list of the medicines you take. Where can you learn more? Go to http://lefty-лоег.info/. Enter D889 in the search box to learn more about \"Learning About Psychogenic Non-Epileptic Seizure. \" Current as of: October 14, 2016 Content Version: 11.2 © 0453-0908 [employer], [employer] Care instructions adapted under license by [employer] (which disclaims liability or warranty for this information). If you have questions about a medical condition or this instruction, always ask your healthcare professional. [address] any warranty or liability for your use of this information.

## 2017-05-01 NOTE — PROGRESS NOTES
completed the initial Spiritual Assessment of the patient in bed 14 of the emergency room at 1110 today. , and offered Pastoral Care support to family member. Patient was not in a condition where she could communicate at this time due to her having seizures. Family member was very scared as The  came to visit. Patient does not have any Rastafarian/cultural needs that will affect patients preferences in health care.    Chaplains will continue to follow and will provide pastoral care on an as needed/requested basis     Chaplain Saul Bello   Board Certified [address] [place] [address]   [phone]

## 2017-05-01 NOTE — CONSULTS
Neurology Consult Note  Dictation, #  Admit Date: 5/1/2017  Length of Stay: 0  Primary Care: Osei De La Cruz MD   Principle Problem: <principal problem not specified>     Assessment:    Status Epilepicus. I personally examined the patient and reviewed the chart I agree with NP Sarahi Rene history and exam.    Patient is known to me from prior hospital presentation with history of localization related epilepsy. Now presented with status epilepticus. Missed the evening dose. ! Will send keppra level . Continue on 1500 mg bid. Will check an EEG today while ICU team try gradually taper off her sedation. Plan:    1. SE- Keppra started at 1500mg BID. Will need EEG to ensure cessation of seizures. Continue with Versed and propofol. History: History provided by  as patient sedated and intubated. Perla Castaneda is a 56 yo AA female with PMH of seizure, RA, ETOH and marijuana abuse, dyslipidemia, DM2, and schizoaffective disorder depressive type who was found this at 0810 by her  having a seizure. He states she was initially staring off into space, not responding, and then started having tonic clonic activity with arms and legs. She had at least 5 of these types of seizures without gaining consciousness before he called EMS. He states it was about 1030 when he called EMS. She takes Keppra 1000mg BID and did not take her PM dose per . She was intubated in the ED shortly after arrival due to concerns about airway. Results reviewed:     CT Results (most recent):    Results from [place] encounter on 05/01/17   CT HEAD WO CONT   Narrative EXAM: CT head    INDICATION: Seizures for approximately 1 1/2 hours that started around 0930  hours today. COMPARISON: 6/13/2015    TECHNIQUE: Axial CT imaging of the head was performed without intravenous  contrast.  Sagittal and coronal reconstructions were created from the axial  data.   One or more dose reduction techniques were used on this CT: automated  exposure control, adjustment of the mAs and/or kVp according to patient's size,  and iterative reconstruction techniques. The specific techniques utilized on  this CT exam have been documented in the patient's electronic medical record.    _______________    FINDINGS:    BRAIN AND POSTERIOR FOSSA: The sulci, folia, ventricles and basal cisterns are  within normal limits for the patient?s age. No intracranial hemorrhage, mass  effect, or midline shift. No areas of abnormal parenchymal attenuation. EXTRA-AXIAL SPACES AND MENINGES: No extra-axial fluid collections. CALVARIUM: Intact. SINUSES: Small rounded mucosal opacities are present in the maxillary sinuses  bilaterally without change, mucous retention cysts and/or polyps. OTHER: No acute changes. _______________         Impression IMPRESSION:    No acute intracranial abnormalities. MRI Results (most recent):  No results found for this or any previous visit.     Latest Hemoglobin A1C:  Lab Results   Component Value Date/Time    Hemoglobin A1c 5.0 06/14/2015 08:06 PM       Latest Cardiology Procedure:  Results for orders placed or performed during the hospital encounter of 05/01/17   EKG, 12 LEAD, INITIAL   Result Value Ref Range    Ventricular Rate 115 BPM    Atrial Rate 115 BPM    P-R Interval 124 ms    QRS Duration 80 ms    Q-T Interval 354 ms    QTC Calculation (Bezet) 489 ms    Calculated P Axis 54 degrees    Calculated R Axis 2 degrees    Calculated T Axis 72 degrees    Diagnosis       Sinus tachycardia  Nonspecific ST and T wave abnormality  Abnormal ECG         Important Labs:  No results found for: FOL, RBCF  Lab Results   Component Value Date/Time    Cholesterol, total 166 06/14/2015 08:06 PM    HDL Cholesterol 80 06/14/2015 08:06 PM    LDL, calculated 75.8 06/14/2015 08:06 PM    VLDL, calculated 10.2 06/14/2015 08:06 PM    Triglyceride 51 06/14/2015 08:06 PM    CHOL/HDL Ratio 2.1 06/14/2015 08:06 PM Lab Results   Component Value Date/Time    TSH 1.17 11/01/2013 06:04 PM     Lab Results   Component Value Date/Time    Phenobarbital <2.4 04/25/2015 10:10 AM    Phenytoin 1.2 03/02/2015 03:40 PM     Discussed with:     Allergies: Allergies   Allergen Reactions    Aspirin Swelling    Penicillins Unknown (comments)      Review of Systems:    Y  N   Constitutional: [] [x] recent weight change  [] [x] fever  [] [] sleep difficulties   ENT/Mouth:  [] [] hearing loss  [] [x] swallowing problems  [] [x] slurred speech   Cardiovascular:  [] [x] chest pain   [] [x] palpitations    Respiratory: [] [x] cough with swallow  [] [x] shortness of breath  [] [] sleep apnea  [x] [] intubated   Gastrointestinal: [] [x] abdominal pain  [] [x] nausea   Genitourinary: [] [] frequent urination  [] [] incontinence    Musculoskeletal:   [] [x] joint pain  [] [x] muscle pain   Integument:   [] [] rash/itching   Neurological:  [] [x] dizziness/vertigo  [] [] sedation  [] [x] confusion  [] [x] agitation/combativeness  [] [] loss of consciousness  [] [] numbness/tingling sensation  [] [] tremors  [] [x] weakness in limbs  [] [] difficulty with balance  [] [] frequent or recurring headaches  [] [] memory loss   [] [] comatose  [x] [] seizures   Psychiatric:   [x] [] depression  [] [] hallucinations   Endocrine: [] [] excessive thirst or urination   [] [] heat or cold intolerance   Hematologic/Lymphatic: [] [] bleeding tendency  [] [] enlarged lymph nodes     PMH:   Past Medical History:   Diagnosis Date    Diabetes     Dyslipidemia     Hypertension     Noncompliance     Polysubstance abuse     ETOH,  marijuana    Psychosis     Pulmonary hypertension     RVSP 60mmHg (ECHO 6/15)    Seizure disorder         Problem List: Active Problems:    Acute respiratory failure ([address] (5/1/2017)      Status epilepticus ([facility] [address] (5/1/2017)        FH: No family history on file.      SH:   Social History     Social History    Marital status:      Spouse name: N/A    Number of children: N/A    Years of education: N/A     Social History Main Topics    Smoking status: Current Every Day Smoker     Packs/day: 0.25    Smokeless tobacco: Not on file    Alcohol use Yes      Comment: occasional beer    Drug use: Yes     Special: Marijuana, Cocaine      Comment: last used Abhi Nava 2/25/15    Sexual activity: Not on file     Other Topics Concern    Not on file     Social History Narrative          Vital Signs:   Visit Vitals    /85    Pulse 81    Temp (!) 37.5 °F (3.1 °C)    Resp 16    Wt 79.8 kg (176 lb)    SpO2 100%    BMI 30.21 kg/m2      Neurological examination:    Appearance: Intubated, sedated   Cardiovascular: Heart is regular rate and rhythm, peripheral edema is absent. No carotid bruits heard   Mental status examination: Sedated. Open eyes only to deep tactile stimulation. Intubated and on propofol 50mcq/kg/min and versed drip at 2mg/hr.  Cranial Nerves:     I: smell Not tested   II: visual fields    II: pupils 4 mm Equal, round, and delayed reactive to light   III,VII: ptosis none   III,IV,VI: extraocular muscles  oculocephalic delayed   V: facial light touch sensation     V,VII: corneal reflex     VII: facial muscle function     VIII: hearing    IX: soft palate elevation     IX,X: gag reflex    XI: sternocleidomastoid strength    XII: tongue strength        Motor exam: . Moves all extremities to noxious stim. Localizes with BUE.  Sensory: pressure sensation intact    Coordination: Deferred   Reflexes: Symmetric and 1+ in bilateral biceps, triceps, brachioradialis, and patellar.  0 bilateral ankle reflexes. Toes are down going.            Medications:      [x] REVIEWED  Current Facility-Administered Medications   Medication    levETIRAcetam (KEPPRA) 1500 mg in 100 ml IVPB    0.9% sodium chloride infusion    propofol (DIPRIVAN) infusion    midazolam (VERSED) 100 mg in 0.9% sodium chloride 100 mL infusion Current Outpatient Prescriptions   Medication Sig    adalimumab (HUMIRA PEN) 40 mg/0.8 mL pnkt 40 mg by SubCUTAneous route. Indications: RHEUMATOID ARTHRITIS    oxyCODONE-acetaminophen (PERCOCET) 5-325 mg per tablet Take 1 tablet every 4-6 hours as needed for pain control. If you were instructed to try over the counter ibuprofen or tylenol, only take the percocet for pain not controlled with the over the counter medication.  atorvastatin (LIPITOR) 20 mg tablet Take 1 Tab by mouth nightly.  ondansetron hcl (ZOFRAN, AS HYDROCHLORIDE,) 4 mg tablet Take 1 Tab by mouth every eight (8) hours as needed for Nausea.  loratadine (CLARITIN) 10 mg tablet Take 10 mg by mouth daily.  Omega-3 Fatty Acids-Fish Oil (FISH OIL) 360-1,200 mg cpDR Take 1,200 mg by mouth.  CALCIUM CARB/VIT D3/MINERALS (CALCIUM-VITAMIN D PO) Take 1,200 mg by mouth.  famotidine (PEPCID) 20 mg tablet Take 20 mg by mouth two (2) times a day.  OLANZapine (ZYPREXA) 5 mg tablet Take 5 mg by mouth nightly.  sertraline (ZOLOFT) 100 mg tablet Take 100 mg by mouth daily.  amLODIPine (NORVASC) 5 mg tablet Take 5 mg by mouth daily.  folic acid (FOLVITE) 1 mg tablet Take 1 mg by mouth daily. Data:      [x] REVIEWED  Recent Results (from the past 24 hour(s))   CBC WITH AUTOMATED DIFF    Collection Time: 05/01/17 11:00 AM   Result Value Ref Range    WBC 9.1 4.6 - 13.2 K/uL    RBC 4.87 4.20 - 5.30 M/uL    HGB 15.5 12.0 - 16.0 g/dL    HCT 47.2 (H) 35.0 - 45.0 %    MCV 96.9 74.0 - 97.0 FL    MCH 31.8 24.0 - 34.0 PG    MCHC 32.8 31.0 - 37.0 g/dL    RDW 14.2 11.6 - 14.5 %    PLATELET 113 841 - 822 K/uL    MPV 10.3 9.2 - 11.8 FL    NEUTROPHILS 62 40 - 73 %    LYMPHOCYTES 34 21 - 52 %    MONOCYTES 3 3 - 10 %    EOSINOPHILS 1 0 - 5 %    BASOPHILS 0 0 - 2 %    ABS. NEUTROPHILS 5.5 1.8 - 8.0 K/UL    ABS. LYMPHOCYTES 3.1 0.9 - 3.6 K/UL    ABS. MONOCYTES 0.3 0.05 - 1.2 K/UL    ABS. EOSINOPHILS 0.1 0.0 - 0.4 K/UL    ABS.  BASOPHILS 0.0 0.0 - 0.06 K/UL    DF AUTOMATED     METABOLIC PANEL, COMPREHENSIVE    Collection Time: 05/01/17 11:00 AM   Result Value Ref Range    Sodium 141 136 - 145 mmol/L    Potassium 3.9 3.5 - 5.5 mmol/L    Chloride 106 100 - 108 mmol/L    CO2 9 (LL) 21 - 32 mmol/L    Anion gap 26 (H) 3.0 - 18 mmol/L    Glucose 279 (H) 74 - 99 mg/dL    BUN 6 (L) 7.0 - 18 MG/DL    Creatinine 1.34 (H) 0.6 - 1.3 MG/DL    BUN/Creatinine ratio 4 (L) 12 - 20      GFR est AA 49 (L) >60 ml/min/1.73m2    GFR est non-AA 40 (L) >60 ml/min/1.73m2    Calcium 9.4 8.5 - 10.1 MG/DL    Bilirubin, total 0.3 0.2 - 1.0 MG/DL    ALT (SGPT) 89 (H) 13 - 56 U/L    AST (SGOT) 87 (H) 15 - 37 U/L    Alk. phosphatase 115 45 - 117 U/L    Protein, total 9.0 (H) 6.4 - 8.2 g/dL    Albumin 3.5 3.4 - 5.0 g/dL    Globulin 5.5 (H) 2.0 - 4.0 g/dL    A-G Ratio 0.6 (L) 0.8 - 1.7     CARDIAC PANEL,(CK, CKMB & TROPONIN)    Collection Time: 05/01/17 11:00 AM   Result Value Ref Range     (H) 26 - 192 U/L    CK - MB 1.6 <3.6 ng/ml    CK-MB Index 0.7 0.0 - 4.0 %    Troponin-I, Qt. 0.04 0.0 - 0.045 NG/ML   MAGNESIUM    Collection Time: 05/01/17 11:00 AM   Result Value Ref Range    Magnesium 2.5 1.6 - 2.6 mg/dL   PROTHROMBIN TIME + INR    Collection Time: 05/01/17 11:00 AM   Result Value Ref Range    Prothrombin time 14.7 11.5 - 15.2 sec    INR 1.2 0.8 - 1.2     PTT    Collection Time: 05/01/17 11:00 AM   Result Value Ref Range    aPTT 23.5 23.0 - 36.4 SEC   POC G3    Collection Time: 05/01/17 11:08 AM   Result Value Ref Range    Device: Non rebreather      Flow rate (POC) 10 L/M    FIO2 (POC) 1.0 %    pH (POC) 7.078 (LL) 7.35 - 7.45      pCO2 (POC) 25.8 (L) 35.0 - 45.0 MMHG    pO2 (POC) 214 (H) 80 - 100 MMHG    HCO3 (POC) 7.6 (L) 22 - 26 MMOL/L    sO2 (POC) 99 (H) 92 - 97 %    Base deficit (POC) 22 mmol/L    Allens test (POC) YES      Total resp.  rate 21      Site RIGHT RADIAL      Patient temp. 99.1      Specimen type (POC) ARTERIAL      Performed by Roel Maldonado    [facility] Collection Time: 05/01/17 11:08 AM   Result Value Ref Range    CO2 (POC) 12 (L) 19 - 24 MMOL/L    Glucose (POC) 294 (H) 74 - 106 MG/DL    BUN (POC) 7 7 - 18 MG/DL    Creatinine (POC) 0.9 0.6 - 1.3 MG/DL    GFR-AA (POC) >60 >60 ml/min/1.73m2    GFR, non-AA (POC) >60 >60 ml/min/1.73m2    Sodium (POC) 140 136 - 145 MMOL/L    Potassium (POC) 3.9 3.5 - 5.5 MMOL/L    Calcium, ionized (POC) 1.09 (L) 1.12 - 1.32 MMOL/L    Chloride (POC) 106 100 - 108 MMOL/L    Anion gap (POC) 27 (H) 10 - 20      Hematocrit (POC) 52 (H) 36 - 49 %    Hemoglobin (POC) 17.7 (H) 12 - 16 G/DL   POC TROPONIN-I    Collection Time: 05/01/17 11:10 AM   Result Value Ref Range    Troponin-I (POC) <0.04 0.00 - 0.08 ng/mL   EKG, 12 LEAD, INITIAL    Collection Time: 05/01/17 11:51 AM   Result Value Ref Range    Ventricular Rate 115 BPM    Atrial Rate 115 BPM    P-R Interval 124 ms    QRS Duration 80 ms    Q-T Interval 354 ms    QTC Calculation (Bezet) 489 ms    Calculated P Axis 54 degrees    Calculated R Axis 2 degrees    Calculated T Axis 72 degrees    Diagnosis       Sinus tachycardia  Nonspecific ST and T wave abnormality  Abnormal ECG     URINALYSIS W/ RFLX MICROSCOPIC    Collection Time: 05/01/17  1:09 PM   Result Value Ref Range    Color YELLOW      Appearance CLEAR      Specific gravity 1.014 1.005 - 1.030      pH (UA) 5.5 5.0 - 8.0      Protein 30 (A) NEG mg/dL    Glucose 250 (A) NEG mg/dL    Ketone NEGATIVE  NEG mg/dL    Bilirubin NEGATIVE  NEG      Blood TRACE (A) NEG      Urobilinogen 0.2 0.2 - 1.0 EU/dL    Nitrites NEGATIVE  NEG      Leukocyte Esterase NEGATIVE  NEG     DRUG SCREEN, URINE    Collection Time: 05/01/17  1:09 PM   Result Value Ref Range    BENZODIAZEPINE POSITIVE (A) NEG      BARBITURATES NEGATIVE  NEG      THC (TH-CANNABINOL) POSITIVE (A) NEG      OPIATES POSITIVE (A) NEG      PCP(PHENCYCLIDINE) NEGATIVE  NEG      COCAINE NEGATIVE  NEG      AMPHETAMINE NEGATIVE  NEG      METHADONE NEGATIVE       HDSCOM (NOTE)

## 2017-05-01 NOTE — ED TRIAGE NOTES
Pt arrived via rescue from home with c/o seizures for approximately 1 1/2 hours that started around 0930 this morning. Per EMS pt  states pt has hx of seizures, but hasn't had a seizure in over a year. Rescue administered 5mg VERSED intranasally prior to arrival. Pt will grimace to pain.

## 2017-05-01 NOTE — ED PROVIDER NOTES
HPI Comments: 11:00 AM Ame Palacio is a 57 y.o. female with a history of Seizure disorder, HTN, Dyslipidemia, Psychosis, and ETOH and marijuana abuse who presents to the emergency department via c/o seizure onset PTA. The EMS explains the patient was unresponsive and diaphoretic upon arival. Per  the pt has been having the seizure episode for the last hour. [place] approximately 1.5 hours. No other concerns at this time. PCP: Mayte Mondragon MD      The history is provided by the patient. Past Medical History:   Diagnosis Date    Diabetes     Dyslipidemia     Hypertension     Noncompliance     Polysubstance abuse     ETOH,  marijuana    Psychosis     Pulmonary hypertension     RVSP 60mmHg (ECHO 6/15)    Seizure disorder        No past surgical history on file. No family history on file. Social History     Social History    Marital status:      Spouse name: N/A    Number of children: N/A    Years of education: N/A     Occupational History    Not on file. Social History Main Topics    Smoking status: Current Every Day Smoker     Packs/day: 0.25    Smokeless tobacco: Not on file    Alcohol use Yes      Comment: occasional beer    Drug use: Yes     Special: Marijuana, Cocaine      Comment: last used marijuanna 2/25/15    Sexual activity: Not on file     Other Topics Concern    Not on file     Social History Narrative         ALLERGIES: Aspirin and Penicillins    Review of Systems   Unable to perform ROS: Patient unresponsive       Vitals:    05/01/17 1101 05/01/17 1111 05/01/17 1115 05/01/17 1118   BP: 116/81 143/87 161/85    Pulse: (!) 117 (!) 125 (!) 125 (!) 105   Resp: 20   27   Temp: 99.1 °F (37.3 °C)      SpO2: 100%   100%   Weight: 79.8 kg (176 lb)               Physical Exam   Constitutional: She is oriented to person, place, and time. She appears well-developed and well-nourished. No distress. Grimacing to pain.    HENT:   Head: Normocephalic and atraumatic. Mouth/Throat: Oropharynx is clear and moist.   Eyes: Conjunctivae and EOM are normal. Pupils are equal, round, and reactive to light. No scleral icterus. Neck: Normal range of motion. Neck supple. Cardiovascular: Regular rhythm and normal heart sounds. Tachycardia present. No murmur heard. Pulmonary/Chest: Effort normal. No respiratory distress. sonorous respiratory sounds     Abdominal: Soft. Bowel sounds are normal. She exhibits no distension. There is no tenderness. Musculoskeletal: She exhibits no edema. Lymphadenopathy:     She has no cervical adenopathy. Neurological: She is oriented to person, place, and time. She is unresponsive. Coordination normal.   Skin: Skin is warm and dry. No rash noted. Psychiatric: She has a normal mood and affect. Her behavior is normal.   Nursing note and vitals reviewed. 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):   Metabolic acidosis:   Diagnosis management comments: H/o seizures unknown medications per ems prolonged seizure of 45 to 1 hour prior to their arrival actively seizing per ems given versed IN with resolution pt postictal responsive to painful stimuli on arrival concern over pt's ability to maintain airway.  Noted profound metabolic acidosis fluids started sodium bicarb given prior to intubation      Ekg; sinus tach rate 115 no acute st changes    cxr ETT at claudette towards R bronchus will pull back 2 cm           Amount and/or Complexity of Data Reviewed  Clinical lab tests: ordered and reviewed  Tests in the radiology section of CPT®: ordered and reviewed  Discuss the patient with other providers: yes  Independent visualization of images, tracings, or specimens: yes    Risk of Complications, Morbidity, and/or Mortality  Presenting problems: high  Diagnostic procedures: high  Management options: high    Critical Care  Total time providing critical care: 30-74 minutes    Patient Progress  Patient progress: improved    ED Course       Intubation  Date/Time: 5/1/2017 11:22 AM  Performed by: Pati Sterling  Authorized by: Pati Sterling     Consent:     Consent obtained:  Emergent situation    Consent given by:  Patient    Risks discussed:  Aspiration, brain injury, death, hypoxia and pneumothorax  Pre-procedure details:     Patient status:  Unresponsive    Paralytics:  Succinylcholine  Procedure details:     Preoxygenation:  Bag valve mask    CPR in progress: no      Intubation method:  Oral    Oral intubation technique: GlideScope. Tube size (mm):  7.5    Tube type:  Cuffed    Number of attempts:  1  Placement assessment:     ETT to lip:  22    Breath sounds:  Equal    Placement verification: equal breath sounds    Post-procedure details:     Patient tolerance of procedure: Tolerated well, no immediate complications  Comments:      Pt was hyperventilated after intubation.            Vitals:  Patient Vitals for the past 12 hrs:   Temp Pulse Resp BP SpO2   05/01/17 1118 - (!) 105 27 - 100 %   05/01/17 1115 - (!) 125 - 161/85 -   05/01/17 1111 - (!) 125 - 143/87 -   05/01/17 1101 99.1 °F (37.3 °C) (!) 117 20 116/81 100 %   05/01/17 1100 - (!) 118 - 109/79 -         Medications ordered:   Medications   levETIRAcetam (KEPPRA) 1500 mg in 100 ml IVPB (1,500 mg IntraVENous New Bag 5/1/17 1119)   0.9% sodium chloride infusion (not administered)   propofol (DIPRIVAN) infusion (50 mcg/kg/min × 79.8 kg IntraVENous Rate Change 5/1/17 1157)   midazolam (VERSED) 100 mg in 0.9% sodium chloride 100 mL infusion (not administered)   sodium chloride 0.9 % bolus infusion 1,000 mL (1,000 mL IntraVENous New Bag 5/1/17 1108)   etomidate (AMIDATE) 2 mg/mL injection 20 mg (20 mg IntraVENous Given 5/1/17 1117)   succinylcholine (ANECTINE) injection 100 mg (100 mg IntraVENous Given 5/1/17 1118)   sodium bicarbonate 8.4 % (1 mEq/mL) injection 50 mEq (50 mEq IntraVENous Given 5/1/17 1114) midazolam (VERSED) injection 4 mg (4 mg IntraVENous Given 5/1/17 1202)         Lab findings:  Recent Results (from the past 12 hour(s))   CBC WITH AUTOMATED DIFF    Collection Time: 05/01/17 11:00 AM   Result Value Ref Range    WBC 9.1 4.6 - 13.2 K/uL    RBC 4.87 4.20 - 5.30 M/uL    HGB 15.5 12.0 - 16.0 g/dL    HCT 47.2 (H) 35.0 - 45.0 %    MCV 96.9 74.0 - 97.0 FL    MCH 31.8 24.0 - 34.0 PG    MCHC 32.8 31.0 - 37.0 g/dL    RDW 14.2 11.6 - 14.5 %    PLATELET 104 587 - 787 K/uL    MPV 10.3 9.2 - 11.8 FL    NEUTROPHILS 62 40 - 73 %    LYMPHOCYTES 34 21 - 52 %    MONOCYTES 3 3 - 10 %    EOSINOPHILS 1 0 - 5 %    BASOPHILS 0 0 - 2 %    ABS. NEUTROPHILS 5.5 1.8 - 8.0 K/UL    ABS. LYMPHOCYTES 3.1 0.9 - 3.6 K/UL    ABS. MONOCYTES 0.3 0.05 - 1.2 K/UL    ABS. EOSINOPHILS 0.1 0.0 - 0.4 K/UL    ABS. BASOPHILS 0.0 0.0 - 0.06 K/UL    DF AUTOMATED     METABOLIC PANEL, COMPREHENSIVE    Collection Time: 05/01/17 11:00 AM   Result Value Ref Range    Sodium 141 136 - 145 mmol/L    Potassium 3.9 3.5 - 5.5 mmol/L    Chloride 106 100 - 108 mmol/L    CO2 9 (LL) 21 - 32 mmol/L    Anion gap 26 (H) 3.0 - 18 mmol/L    Glucose 279 (H) 74 - 99 mg/dL    BUN 6 (L) 7.0 - 18 MG/DL    Creatinine 1.34 (H) 0.6 - 1.3 MG/DL    BUN/Creatinine ratio 4 (L) 12 - 20      GFR est AA 49 (L) >60 ml/min/1.73m2    GFR est non-AA 40 (L) >60 ml/min/1.73m2    Calcium 9.4 8.5 - 10.1 MG/DL    Bilirubin, total 0.3 0.2 - 1.0 MG/DL    ALT (SGPT) 89 (H) 13 - 56 U/L    AST (SGOT) 87 (H) 15 - 37 U/L    Alk.  phosphatase 115 45 - 117 U/L    Protein, total 9.0 (H) 6.4 - 8.2 g/dL    Albumin 3.5 3.4 - 5.0 g/dL    Globulin 5.5 (H) 2.0 - 4.0 g/dL    A-G Ratio 0.6 (L) 0.8 - 1.7     CARDIAC PANEL,(CK, CKMB & TROPONIN)    Collection Time: 05/01/17 11:00 AM   Result Value Ref Range     (H) 26 - 192 U/L    CK - MB 1.6 <3.6 ng/ml    CK-MB Index 0.7 0.0 - 4.0 %    Troponin-I, Qt. 0.04 0.0 - 0.045 NG/ML   MAGNESIUM    Collection Time: 05/01/17 11:00 AM   Result Value Ref Range Magnesium 2.5 1.6 - 2.6 mg/dL   PROTHROMBIN TIME + INR    Collection Time: 05/01/17 11:00 AM   Result Value Ref Range    Prothrombin time 14.7 11.5 - 15.2 sec    INR 1.2 0.8 - 1.2     PTT    Collection Time: 05/01/17 11:00 AM   Result Value Ref Range    aPTT 23.5 23.0 - 36.4 SEC   POC G3    Collection Time: 05/01/17 11:08 AM   Result Value Ref Range    Device: Non rebreather      Flow rate (POC) 10 L/M    FIO2 (POC) 1.0 %    pH (POC) 7.078 (LL) 7.35 - 7.45      pCO2 (POC) 25.8 (L) 35.0 - 45.0 MMHG    pO2 (POC) 214 (H) 80 - 100 MMHG    HCO3 (POC) 7.6 (L) 22 - 26 MMOL/L    sO2 (POC) 99 (H) 92 - 97 %    Base deficit (POC) 22 mmol/L    Allens test (POC) YES      Total resp.  rate 21      Site RIGHT RADIAL      Patient temp. 99.1      Specimen type (POC) ARTERIAL      Performed by [facility]    Collection Time: 05/01/17 11:08 AM   Result Value Ref Range    CO2 (POC) 12 (L) 19 - 24 MMOL/L    Glucose (POC) 294 (H) 74 - 106 MG/DL    BUN (POC) 7 7 - 18 MG/DL    Creatinine (POC) 0.9 0.6 - 1.3 MG/DL    GFR-AA (POC) >60 >60 ml/min/1.73m2    GFR, non-AA (POC) >60 >60 ml/min/1.73m2    Sodium (POC) 140 136 - 145 MMOL/L    Potassium (POC) 3.9 3.5 - 5.5 MMOL/L    Calcium, ionized (POC) 1.09 (L) 1.12 - 1.32 MMOL/L    Chloride (POC) 106 100 - 108 MMOL/L    Anion gap (POC) 27 (H) 10 - 20      Hematocrit (POC) 52 (H) 36 - 49 %    Hemoglobin (POC) 17.7 (H) 12 - 16 G/DL   POC TROPONIN-I    Collection Time: 05/01/17 11:10 AM   Result Value Ref Range    Troponin-I (POC) <0.04 0.00 - 0.08 ng/mL   EKG, 12 LEAD, INITIAL    Collection Time: 05/01/17 11:51 AM   Result Value Ref Range    Ventricular Rate 115 BPM    Atrial Rate 115 BPM    P-R Interval 124 ms    QRS Duration 80 ms    Q-T Interval 354 ms    QTC Calculation (Bezet) 489 ms    Calculated P Axis 54 degrees    Calculated R Axis 2 degrees    Calculated T Axis 72 degrees    Diagnosis       Sinus tachycardia  Nonspecific ST and T wave abnormality  Abnormal ECG         EKG interpretation by ED Physician:       X-Ray, CT or other radiology findings or impressions:  CT HEAD WO CONT   Final Result   IMPRESSION:     No acute intracranial abnormalities. XR CHEST PORT    (Results Pending)       Orders:  Orders Placed This Encounter    INTUBATE PATIENT     This order was created via procedure documentation     Standing Status:   Standing     Number of Occurrences:   1    CT HEAD WO CONT     Standing Status:   Standing     Number of Occurrences:   1     Order Specific Question:   Transport     Answer:   Stretcher [5]     Order Specific Question:   Is Patient Allergic to Contrast Dye? Answer:   No    XR CHEST PORT     Standing Status:   Standing     Number of Occurrences:   1     Order Specific Question:   Reason for Exam     Answer:   intubation    BLOOD GAS, ARTERIAL     Standing Status:   Standing     Number of Occurrences:   1    CBC WITH AUTOMATED DIFF     Standing Status:   Standing     Number of Occurrences:   1    METABOLIC PANEL, COMPREHENSIVE     Standing Status:   Standing     Number of Occurrences:   1    CARDIAC PANEL,(CK, CKMB & TROPONIN)     Standing Status:   Standing     Number of Occurrences:   1    URINALYSIS W/ RFLX MICROSCOPIC     Standing Status:   Standing     Number of Occurrences:   1    MAGNESIUM     Standing Status:   Standing     Number of Occurrences:   1    PROTHROMBIN TIME + INR     Standing Status:   Standing     Number of Occurrences:   1    PTT     Standing Status:   Standing     Number of Occurrences:   1    DRUG SCREEN, URINE     Standing Status:   Standing     Number of Occurrences:   1    BLOOD GAS, ARTERIAL     Standing Status:   Standing     Number of Occurrences:   1    LEVETIRACETAM (KEPPRA)     Standing Status:   Standing     Number of Occurrences:   1    IP CONSULT TO TELE-NEUROLOGY     Standing Status:   Standing     Number of Occurrences:   1     Order Specific Question:   Reason for Consult:      Answer:   status epilepticus Order Specific Question:   Did you call or speak to the consulting provider? Answer:   No     Order Specific Question:   Consult To     Answer:   Pat[facility]     Order Specific Question:   Schedule When? Answer:   TODAY    IP CONSULT TO INTENSIVIST     Standing Status:   Standing     Number of Occurrences:   1     Order Specific Question:   Reason for Consult: Answer:   intubation metabolic acidosis seizure     Order Specific Question:   Did you call or speak to the consulting provider? Answer:   No     Order Specific Question:   Consult To     Answer:   critical care     Order Specific Question:   Schedule When? Answer:   TODAY    RT--MECHANICAL VENTILATOR     Standing Status:   Standing     Number of Occurrences:   1     Order Specific Question:   Mode:     Answer:   PRESSURE REGULATED VOLUME CONTROL     Order Specific Question:   PEEP     Answer:   7.0 CM/H2O     Order Specific Question:   Rate:      Answer:   12     Order Specific Question:   Tidal Volume     Answer:   400     Order Specific Question:   FIO2     Answer:   100%    POC TROPONIN-I     Standing Status:   Standing     Number of Occurrences:   1    POC CHEM8     Standing Status:   Standing     Number of Occurrences:   1    POC G3     Standing Status:   Standing     Number of Occurrences:   1    POC CHEM8     Standing Status:   Standing     Number of Occurrences:   1    POC TROPONIN-I     Standing Status:   Standing     Number of Occurrences:   1    EKG, 12 LEAD, INITIAL     Standing Status:   Standing     Number of Occurrences:   1     Order Specific Question:   Reason for Exam:     Answer:   status seizure    levETIRAcetam (KEPPRA) 1500 mg in 100 ml IVPB    sodium chloride 0.9 % bolus infusion 1,000 mL    0.9% sodium chloride infusion    etomidate (AMIDATE) 2 mg/mL injection 20 mg    succinylcholine (ANECTINE) injection 100 mg    DISCONTD: sodium bicarbonate (8.4%) injection 50 mEq    sodium bicarbonate 8.4 % (1 mEq/mL) injection     Hanh Fuentes   : cabinet override    sodium bicarbonate 8.4 % (1 mEq/mL) injection 50 mEq    propofol (DIPRIVAN) infusion     Order Specific Question:   Sedation Scale     Answer:   RASS     Order Specific Question:   Sedation Score     Answer:   RASS -2    midazolam (VERSED) injection 4 mg    midazolam (VERSED) 1 mg/mL injection     Teena Gupta   : cabinet override    midazolam (VERSED) 100 mg in 0.9% sodium chloride 100 mL infusion    IP CONSULT TO HOSPITALIST     Standing Status:   Standing     Number of Occurrences:   1     Order Specific Question:   Reason for Consult: Answer:   OTHER     Order Specific Question:   Did you call or speak to the consulting provider? Answer:   No     Order Specific Question:   Consult To     Answer:   [facility]     Order Specific Question:   Schedule When? Answer:   TODAY    INITIAL PHYSICIAN ORDER: INPATIENT Intensive Care; 3. Patient receiving treatment that can only be provided in an inpatient setting (further clarification in H&P documentation)     Standing Status:   Standing     Number of Occurrences:   1     Order Specific Question:   Status: Answer:   Inpatient [101]     Order Specific Question:   Type of Bed     Answer:   Intensive Care [6]     Order Specific Question:   Inpatient Hospitalization Certified Necessary for the Following Reasons     Answer:   3. Patient receiving treatment that can only be provided in an inpatient setting (further clarification in H&P documentation)     Order Specific Question:   Admitting Diagnosis     Answer:   Status epilepticus [facility]) [621539]     Order Specific Question:   Admitting Diagnosis     Answer:   Acute respiratory failure ([facility] [address] [518.81. ICD-9-CM]     Order Specific Question:   Admitting Physician     Answer:   Tyrone Cohen     Order Specific Question:   Attending Physician     Answer:   Tyrone Cohen     Order Specific Question:   Estimated Length of Stay Answer:   > or = to 2 Midnights     Order Specific Question:   Discharge Plan:     Answer:   Home with Office Follow-up       Progress notes, Consult notes, or additional Procedure notes:   11:15 AM Concerned over patient ability to maintain airway. Profound sarcoidosis , Sodium Bicarb given for intubation. 12:17 PM Consult:  Discussed care with Dr. Gena Hawkins, agrees with admission. Standard discussion; including history of patients chief complaint, available diagnostic results, and treatment course. 12:23 PM Consult:  Discussed care with Dr. Risa Garza discussion; including history of patients chief complaint, available diagnostic results, and treatment course. 12:32 PM Discussed with , last dose of Keppra was yesterday afternoon. Disposition:  Diagnosis:   1. 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 Metabolic acidosis        Disposition:     Follow-up Information     None           Patient's Medications   Start Taking    No medications on file   Continue Taking    ADALIMUMAB (HUMIRA PEN) 40 MG/0.8 ML PNKT    40 mg by SubCUTAneous route. Indications: RHEUMATOID ARTHRITIS    AMLODIPINE (NORVASC) 5 MG TABLET    Take 5 mg by mouth daily. ATORVASTATIN (LIPITOR) 20 MG TABLET    Take 1 Tab by mouth nightly. CALCIUM CARB/VIT D3/MINERALS (CALCIUM-VITAMIN D PO)    Take 1,200 mg by mouth. FAMOTIDINE (PEPCID) 20 MG TABLET    Take 20 mg by mouth two (2) times a day. FOLIC ACID (FOLVITE) 1 MG TABLET    Take 1 mg by mouth daily. LORATADINE (CLARITIN) 10 MG TABLET    Take 10 mg by mouth daily. OLANZAPINE (ZYPREXA) 5 MG TABLET    Take 5 mg by mouth nightly. OMEGA-3 FATTY ACIDS-FISH OIL (FISH OIL) 360-1,200 MG CPDR    Take 1,200 mg by mouth. ONDANSETRON HCL (ZOFRAN, AS HYDROCHLORIDE,) 4 MG TABLET    Take 1 Tab by mouth every eight (8) hours as needed for Nausea.     OXYCODONE-ACETAMINOPHEN (PERCOCET) 5-325 MG PER TABLET Take 1 tablet every 4-6 hours as needed for pain control. If you were instructed to try over the counter ibuprofen or tylenol, only take the percocet for pain not controlled with the over the counter medication. SERTRALINE (ZOLOFT) 100 MG TABLET    Take 100 mg by mouth daily. These Medications have changed    No medications on file   Stop Taking    No medications on file             [address] for and in presence of Homa Oneill MD (05/01/17)      Physician Attestation  I personally preformed the services described in this documentation, reviewed and edited the documentation which was dictated to the scribe in my presence, and it accurately records my words and actions.      Homa Oneill MD (05/01/17)      Signed by: Payal Short, 05/01/17, 10:59 AM

## 2017-05-01 NOTE — ED NOTES
200- MD, RN, RT, and ER tech remain at bedside. 1117- 20mg etomidate given IVP in right AC by AMRIK Lopez.  1118- 100mg succinycholine given IVP in right AC by Rodriguez Best RN.  1118- Pt intubated by Dr. José Miguel Skelton with 7.5fr, 22 at the lip. Positive color change, positive bilateral breath sounds. 1122- Pt transported to CT by RN, RT, and ER tech on cardiac monitor.

## 2017-05-01 NOTE — H&P
History and Physical    Patient: Hodges Boast               Sex: female          DOA: 5/1/2017       YOB: 1955      Age:  57 y.o.        LOS:  LOS: 0 days        HPI:     Hodges Boast is a 57 y.o. female who was brought to the hospital via EMS. She has a known seizure disorder and she began seizing at home this AM.  She seized for prolonged time, estimated to be more than 30 minutes prior to EMS arrival.  She was given nasal anticonvulsant in the field. She again seized in the ER. She was unable to be controlled with Keppra initially and she went on to require Versed and Propofol as well. She was intubated in order to protect her airway. Her seizures became controlled with extended medications. Her  report that she did not take Keppra last night or this AM.  She will require admission to the ICU for ongoing management. Past Medical History:   Diagnosis Date    Diabetes     Dyslipidemia     Hypertension     Noncompliance     Polysubstance abuse     ETOH,  marijuana    Psychosis     Pulmonary hypertension     RVSP 60mmHg (ECHO 6/15)    Seizure disorder        Social History:   Tobacco use:  Unknown   Alcohol use:  Unknown   Occupation:  Unknown    Family History:   Unknown    Review of Systems    Constitutional:  No fever or weight loss; Altered mental status as above. HEENT:  No headache or visual changes  Cardiovascular:  No chest pain or diaphoresis  Respiratory:  No coughing, wheezing, or shortness of breath. GI:  No nausea or vomitting.   No diarrhea  :  No hematuria or dysuria  Skin:  No rashes or moles  Neuro:  Seizures as above, no syncope  Hematological:  No bruising or bleeding  Endocrine:  No diabetes or thyroid disease    Physical Exam:      Visit Vitals    /85    Pulse 83    Temp (!) 37.5 °F (3.1 °C)    Resp 16    Wt 79.8 kg (176 lb)    SpO2 100%    BMI 30.21 kg/m2       Physical Exam:    Gen:  Sedated, on the ventilator, not interacting  HEENT:  Normal cephalic atraumatic, extra-occular movements are intact. Neck:  Supple, No JVD  Lungs:  Clear bilaterally, no wheeze, no rales, normal effort  Heart:  Regular Rate and Rhythm, normal S1 and S2, no edema  Abdomen:  Soft, non tender, normal bowel sounds, no guarding. Extremities:  Well perfused, no cyanosis or edema  Neurological:  No seizure activity, normal tone; Sedated  Skin:  No rashes or moles      Laboratory Studies:    BMP:   Lab Results   Component Value Date/Time     05/01/2017 11:00 AM    K 3.9 05/01/2017 11:00 AM     05/01/2017 11:00 AM    CO2 9 (LL) 05/01/2017 11:00 AM    AGAP 26 (H) 05/01/2017 11:00 AM     (H) 05/01/2017 11:00 AM    BUN 6 (L) 05/01/2017 11:00 AM    CREA 1.34 (H) 05/01/2017 11:00 AM    GFRAA 49 (L) 05/01/2017 11:00 AM    GFRNA 40 (L) 05/01/2017 11:00 AM     CBC:   Lab Results   Component Value Date/Time    WBC 9.1 05/01/2017 11:00 AM    HGB 15.5 05/01/2017 11:00 AM    HCT 47.2 (H) 05/01/2017 11:00 AM     05/01/2017 11:00 AM       Assessment/Plan     Principal Problem:    Status epilepticus ([facility] [address] (5/1/2017)    Active Problems:    Acute respiratory failure ([address] (5/1/2017)      Diabetes ()      Hypertension ()      Dyslipidemia ()        PLAN:    Continue with supportive care on the ventilator  Monitor mental status  BS control  BP control  Follow renal function  Neurology consult  Critical care consult.   GI prophylaxis  DVT prophylaxis

## 2017-05-02 ENCOUNTER — APPOINTMENT (OUTPATIENT)
Dept: GENERAL RADIOLOGY | Age: 62
DRG: 100 | End: 2017-05-02
Attending: PHYSICIAN ASSISTANT
Payer: SELF-PAY

## 2017-05-02 LAB
ALBUMIN SERPL BCP-MCNC: 2.9 G/DL (ref 3.4–5)
ALBUMIN/GLOB SERPL: 0.7 {RATIO} (ref 0.8–1.7)
ALP SERPL-CCNC: 83 U/L (ref 45–117)
ALT SERPL-CCNC: 64 U/L (ref 13–56)
ANION GAP BLD CALC-SCNC: 12 MMOL/L (ref 3–18)
ARTERIAL PATENCY WRIST A: NO
AST SERPL W P-5'-P-CCNC: 74 U/L (ref 15–37)
BASE DEFICIT BLD-SCNC: 7 MMOL/L
BASOPHILS # BLD AUTO: 0 K/UL (ref 0–0.06)
BASOPHILS # BLD: 0 % (ref 0–2)
BDY SITE: ABNORMAL
BILIRUB DIRECT SERPL-MCNC: 0.2 MG/DL (ref 0–0.2)
BILIRUB SERPL-MCNC: 0.4 MG/DL (ref 0.2–1)
BODY TEMPERATURE: 37.4
BUN SERPL-MCNC: 8 MG/DL (ref 7–18)
BUN/CREAT SERPL: 5 (ref 12–20)
CA-I SERPL-SCNC: 1.07 MMOL/L (ref 1.12–1.32)
CALCIUM SERPL-MCNC: 8.1 MG/DL (ref 8.5–10.1)
CHLORIDE SERPL-SCNC: 113 MMOL/L (ref 100–108)
CK MB CFR SERPL CALC: 0.7 % (ref 0–4)
CK MB SERPL-MCNC: 4.6 NG/ML (ref 5–25)
CK SERPL-CCNC: 685 U/L (ref 26–192)
CO2 SERPL-SCNC: 20 MMOL/L (ref 21–32)
CREAT SERPL-MCNC: 1.68 MG/DL (ref 0.6–1.3)
DIFFERENTIAL METHOD BLD: ABNORMAL
EOSINOPHIL # BLD: 0 K/UL (ref 0–0.4)
EOSINOPHIL NFR BLD: 1 % (ref 0–5)
ERYTHROCYTE [DISTWIDTH] IN BLOOD BY AUTOMATED COUNT: 13.9 % (ref 11.6–14.5)
ETHANOL SERPL-MCNC: <3 MG/DL (ref 0–3)
GAS FLOW.O2 O2 DELIVERY SYS: ABNORMAL L/MIN
GAS FLOW.O2 SETTING OXYMISER: 16 BPM
GLOBULIN SER CALC-MCNC: 4.2 G/DL (ref 2–4)
GLUCOSE BLD STRIP.AUTO-MCNC: 109 MG/DL (ref 70–110)
GLUCOSE BLD STRIP.AUTO-MCNC: 110 MG/DL (ref 70–110)
GLUCOSE BLD STRIP.AUTO-MCNC: 112 MG/DL (ref 70–110)
GLUCOSE BLD STRIP.AUTO-MCNC: 92 MG/DL (ref 70–110)
GLUCOSE SERPL-MCNC: 98 MG/DL (ref 74–99)
HCO3 BLD-SCNC: 18.7 MMOL/L (ref 22–26)
HCT VFR BLD AUTO: 39.2 % (ref 35–45)
HGB BLD-MCNC: 13.1 G/DL (ref 12–16)
INSPIRATION.DURATION SETTING TIME VENT: 0.75 SEC
LACTATE SERPL-SCNC: 1.8 MMOL/L (ref 0.4–2)
LACTATE SERPL-SCNC: 2.1 MMOL/L (ref 0.4–2)
LYMPHOCYTES # BLD AUTO: 15 % (ref 21–52)
LYMPHOCYTES # BLD: 1.3 K/UL (ref 0.9–3.6)
MAGNESIUM SERPL-MCNC: 2.3 MG/DL (ref 1.6–2.6)
MCH RBC QN AUTO: 30.8 PG (ref 24–34)
MCHC RBC AUTO-ENTMCNC: 33.4 G/DL (ref 31–37)
MCV RBC AUTO: 92.2 FL (ref 74–97)
MONOCYTES # BLD: 0.8 K/UL (ref 0.05–1.2)
MONOCYTES NFR BLD AUTO: 10 % (ref 3–10)
NEUTS SEG # BLD: 6.2 K/UL (ref 1.8–8)
NEUTS SEG NFR BLD AUTO: 74 % (ref 40–73)
O2/TOTAL GAS SETTING VFR VENT: 40 %
PCO2 BLD: 36.1 MMHG (ref 35–45)
PEEP RESPIRATORY: 7 CMH2O
PH BLD: 7.32 [PH] (ref 7.35–7.45)
PHOSPHATE SERPL-MCNC: 2 MG/DL (ref 2.5–4.9)
PIP ISTAT,IPIP: 20
PLATELET # BLD AUTO: 140 K/UL (ref 135–420)
PMV BLD AUTO: 9.9 FL (ref 9.2–11.8)
PO2 BLD: 149 MMHG (ref 80–100)
POTASSIUM SERPL-SCNC: 3.4 MMOL/L (ref 3.5–5.5)
POTASSIUM SERPL-SCNC: 4.2 MMOL/L (ref 3.5–5.5)
PROT SERPL-MCNC: 7.1 G/DL (ref 6.4–8.2)
RBC # BLD AUTO: 4.25 M/UL (ref 4.2–5.3)
SAO2 % BLD: 99 % (ref 92–97)
SERVICE CMNT-IMP: ABNORMAL
SODIUM SERPL-SCNC: 145 MMOL/L (ref 136–145)
SPECIMEN TYPE: ABNORMAL
TOTAL RESP. RATE, ITRR: 16
TROPONIN I SERPL-MCNC: 1.48 NG/ML (ref 0–0.04)
VENTILATION MODE VENT: ABNORMAL
VOLUME CONTROL PLUS IVLCP: YES
VT SETTING VENT: 400 ML
WBC # BLD AUTO: 8.4 K/UL (ref 4.6–13.2)

## 2017-05-02 PROCEDURE — 94003 VENT MGMT INPAT SUBQ DAY: CPT

## 2017-05-02 PROCEDURE — 74011250636 HC RX REV CODE- 250/636: Performed by: HOSPITALIST

## 2017-05-02 PROCEDURE — 71010 XR CHEST PORT: CPT

## 2017-05-02 PROCEDURE — 85025 COMPLETE CBC W/AUTO DIFF WBC: CPT | Performed by: HOSPITALIST

## 2017-05-02 PROCEDURE — 95951 HC EEG EPILEPSY MONITOR > 16: CPT

## 2017-05-02 PROCEDURE — 83605 ASSAY OF LACTIC ACID: CPT | Performed by: PHYSICIAN ASSISTANT

## 2017-05-02 PROCEDURE — A6209 FOAM DRSG <=16 SQ IN W/O BDR: HCPCS

## 2017-05-02 PROCEDURE — 36600 WITHDRAWAL OF ARTERIAL BLOOD: CPT

## 2017-05-02 PROCEDURE — 74011250636 HC RX REV CODE- 250/636: Performed by: PHYSICIAN ASSISTANT

## 2017-05-02 PROCEDURE — 84132 ASSAY OF SERUM POTASSIUM: CPT | Performed by: HOSPITALIST

## 2017-05-02 PROCEDURE — 82962 GLUCOSE BLOOD TEST: CPT

## 2017-05-02 PROCEDURE — 80076 HEPATIC FUNCTION PANEL: CPT | Performed by: PHYSICIAN ASSISTANT

## 2017-05-02 PROCEDURE — 82803 BLOOD GASES ANY COMBINATION: CPT

## 2017-05-02 PROCEDURE — 93308 TTE F-UP OR LMTD: CPT

## 2017-05-02 PROCEDURE — C9113 INJ PANTOPRAZOLE SODIUM, VIA: HCPCS | Performed by: PHYSICIAN ASSISTANT

## 2017-05-02 PROCEDURE — 74011250636 HC RX REV CODE- 250/636: Performed by: EMERGENCY MEDICINE

## 2017-05-02 PROCEDURE — 83735 ASSAY OF MAGNESIUM: CPT | Performed by: INTERNAL MEDICINE

## 2017-05-02 PROCEDURE — 65610000006 HC RM INTENSIVE CARE

## 2017-05-02 PROCEDURE — 74011250636 HC RX REV CODE- 250/636: Performed by: INTERNAL MEDICINE

## 2017-05-02 PROCEDURE — 74011000258 HC RX REV CODE- 258: Performed by: INTERNAL MEDICINE

## 2017-05-02 PROCEDURE — 84100 ASSAY OF PHOSPHORUS: CPT | Performed by: INTERNAL MEDICINE

## 2017-05-02 PROCEDURE — 74011000250 HC RX REV CODE- 250: Performed by: PHYSICIAN ASSISTANT

## 2017-05-02 PROCEDURE — 82330 ASSAY OF CALCIUM: CPT | Performed by: INTERNAL MEDICINE

## 2017-05-02 PROCEDURE — 74011250637 HC RX REV CODE- 250/637: Performed by: HOSPITALIST

## 2017-05-02 PROCEDURE — 36415 COLL VENOUS BLD VENIPUNCTURE: CPT | Performed by: HOSPITALIST

## 2017-05-02 PROCEDURE — 94760 N-INVAS EAR/PLS OXIMETRY 1: CPT

## 2017-05-02 RX ORDER — INSULIN LISPRO 100 [IU]/ML
INJECTION, SOLUTION INTRAVENOUS; SUBCUTANEOUS EVERY 6 HOURS
Status: DISCONTINUED | OUTPATIENT
Start: 2017-05-02 | End: 2017-05-08 | Stop reason: HOSPADM

## 2017-05-02 RX ORDER — DEXTROSE, SODIUM CHLORIDE, AND POTASSIUM CHLORIDE 5; .45; .15 G/100ML; G/100ML; G/100ML
125 INJECTION INTRAVENOUS CONTINUOUS
Status: DISCONTINUED | OUTPATIENT
Start: 2017-05-02 | End: 2017-05-08 | Stop reason: HOSPADM

## 2017-05-02 RX ADMIN — DEXTROSE MONOHYDRATE, SODIUM CHLORIDE, AND POTASSIUM CHLORIDE 125 ML/HR: 50; 4.5; 1.49 INJECTION, SOLUTION INTRAVENOUS at 05:49

## 2017-05-02 RX ADMIN — LEVETIRACETAM 1500 MG: 15 INJECTION INTRAVENOUS at 23:34

## 2017-05-02 RX ADMIN — DEXTROSE MONOHYDRATE, SODIUM CHLORIDE, AND POTASSIUM CHLORIDE 125 ML/HR: 50; 4.5; 1.49 INJECTION, SOLUTION INTRAVENOUS at 21:15

## 2017-05-02 RX ADMIN — LEVETIRACETAM 1500 MG: 15 INJECTION INTRAVENOUS at 11:59

## 2017-05-02 RX ADMIN — PROPOFOL 40 MCG/KG/MIN: 10 INJECTION, EMULSION INTRAVENOUS at 10:16

## 2017-05-02 RX ADMIN — PROPOFOL 40 MCG/KG/MIN: 10 INJECTION, EMULSION INTRAVENOUS at 19:56

## 2017-05-02 RX ADMIN — ENOXAPARIN SODIUM 40 MG: 40 INJECTION SUBCUTANEOUS at 15:13

## 2017-05-02 RX ADMIN — PROPOFOL 30 MCG/KG/MIN: 10 INJECTION, EMULSION INTRAVENOUS at 03:08

## 2017-05-02 RX ADMIN — SODIUM CHLORIDE 40 MG: 9 INJECTION INTRAMUSCULAR; INTRAVENOUS; SUBCUTANEOUS at 08:52

## 2017-05-02 RX ADMIN — CHLORHEXIDINE GLUCONATE 10 ML: 1.2 RINSE ORAL at 08:09

## 2017-05-02 RX ADMIN — ACETAMINOPHEN 325 MG: 650 SUPPOSITORY RECTAL at 18:15

## 2017-05-02 RX ADMIN — CHLORHEXIDINE GLUCONATE 10 ML: 1.2 RINSE ORAL at 21:15

## 2017-05-02 RX ADMIN — CALCIUM GLUCONATE 2 G: 94 INJECTION, SOLUTION INTRAVENOUS at 21:54

## 2017-05-02 NOTE — DIABETES MGMT
NUTRITIONAL ASSESSMENT GLYCEMIC CONTROL/ PLAN OF CARE     Adriana Duff           57 y.o.           5/1/2017                 1. 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 Metabolic acidosis         INTERVENTIONS/PLAN:   · Monitor feeding status, labs and weights. · If tube feeding needed, suggest Osmolite 1 sophia, initial rate of 20 ml/hr. Goal rate is 60 ml/hr (1526 calories, 65 grams protein, 1213 ml free water/d. · No glycemic control needs seen at this time. ASSESSMENT:   Nutritional Status:  Pt is 147% ideal wt; BMI (calculated): 30.2 kg/m2 (obese weight classification). Pt appears well nourished. Currently intubated. Diabetes Management:   Unable to speak with pt but chart review indicates pt with history of diabetes. All A1C's over the past few years have been in the 5's. Unsure as to the cause of hyperglycemia on 5/1/17 but all blood glucose readings have been in target range today without pt needing any insulin. Recent blood glucose:      5/1/17: lab - 659 at 2014 and retested on 5/2 at 0100 and was 98 mg/dL. Within target range (non-ICU: <140; ICU<180): [x] Yes, today 5/2   []  No    Current Insulin regimen: corrective lispronormal insulin sensitivity  every 6 hours   Home medication/insulin regimen: none lsited  HbA1c:  5.3% - ave BG has been ~ 99 mg/dl over past 3 months. Lab Results   Component Value Date/Time    Hemoglobin A1c 5.3 05/01/2017 04:00 PM    Hemoglobin A1c 5.0 06/14/2015 08:06 PM    Hemoglobin A1c 5.0 04/25/2015 10:10 AM   3/24/16 A1C from Chart Everywhere - 5.4%  Adequate glycemic control PTA:  [x] Yes  [] No       SUBJECTIVE/OBJECTIVE:   Information obtained from: chart review, ICU rounds  Chart review indicates pt was admitted and seen by [facility] on 6-19-17 where she was receiving a dental soft diet. Pt with history of seizure disorder admitted with seizure at home and in ED and was intubated to protect airway.      Diet: NPO    No data found. Medications: [x]                Reviewed     IVF:  D5 1/2 NS with 20 mEq KCl/L at 125 ml/hr    Most Recent POC Glucose:   Recent Labs      05/02/17   0100  05/01/17 2014 05/01/17   1100   GLU  98  659*  279*         Labs:   Lab Results   Component Value Date/Time    Hemoglobin A1c 5.3 05/01/2017 04:00 PM     Lab Results   Component Value Date/Time    Sodium 145 05/02/2017 01:00 AM    Potassium 3.4 05/02/2017 01:00 AM    Chloride 113 05/02/2017 01:00 AM    CO2 20 05/02/2017 01:00 AM    Anion gap 12 05/02/2017 01:00 AM    Glucose 98 05/02/2017 01:00 AM    BUN 8 05/02/2017 01:00 AM    Creatinine 1.68 05/02/2017 01:00 AM    Calcium 8.1 05/02/2017 01:00 AM    Magnesium 2.5 05/01/2017 11:00 AM    Phosphorus 3.1 06/16/2015 01:15 AM    Albumin 2.9 05/02/2017 01:00 AM       Anthropometrics: IBW : 54.4 kg (120 lb), % IBW (Calculated): 146.67 %, BMI (calculated): 30.2  Wt Readings from Last 1 Encounters:   05/01/17 79.8 kg (176 lb)     6-14-15 weight at prior admission - 84.8 kg    Ht Readings from Last 1 Encounters:   05/01/17 5' 4\" (1.626 m)       Estimated Nutrition Needs:  1612 Kcals/day, Protein (g): 65 g Fluid (ml): 1600 ml  Based on:   [x]          Actual BW    []          ABW   []            Adjusted BW        Nutrition Diagnoses:   Inadequate oral food and beverage intake due to intubation as evidenced by NPO orders. Obesity due to excessive energy intake PTA as evidenced by BMI of 30.2 kg/m2. Nutrition Interventions:  Tube feeding recommendations if needed. Goal:   Blood glucose will be within target range of  mg/dL by 5/5/17 - met 5/2/17. Provision of nutrition will meet >75% estimated energy needs by 5/7/17. Weight maintenance (+/- 1-2 kg) by 5/12/17.         Nutrition Monitoring and Evaluation      []     Monitor po intake on meal rounds  [x]     Continue inpatient monitoring and intervention  []     Other:      Nutrition Risk:  [x]   High     []  Moderate    [] Minimal/Uncompromised    Norman Vickers, [address], [address]   Office:  [address] [facility] Pager:  [phone]

## 2017-05-02 NOTE — ROUTINE PROCESS
0715- Bedside and Verbal shift change report given to Guadalupe Story, RN   (oncoming nurse) by Myra Rodriguez RN (offgoing nurse). Report included the following information SBAR, Kardex, Intake/Output and MAR   0750- Dr Corie arellano for pt SBP in mid 80s. 0845- EEG in to see patient  0900- SBT postponed due to continous EEG monitoring and seizure precaution  1600- reassessment complete and documented in flow sheets. See MAR for medication titrations  1655- pt family in to see patient. 1900- Bedside and Verbal shift change report given to Myra Rodriguez RN (oncoming nurse) by Guadalupe Story RN   (offgoing nurse). Report included the following information SBAR, Kardex and MAR.

## 2017-05-02 NOTE — PROGRESS NOTES
1909> Assumed care. Patient is sedated. Lowered down diprivan to 20 mcg/kg/min due to low BP. Will monitor. 2110> T = 100.2, tylenol given  2215> Blood cultures   2311> Talked to Dr. Rosmery Robles, NS bolus 500ml ordered  2340> POC blood sugar = 92, repeat = 95. STAT BMP ordered. Informed Dr. Rosmery Robles. 0210> Urine culture obtained  0245> BMP results reveal Glucose = 98, K = 3.4. Informed Dr. Rosmery Robles, see orders  0715> Bedside and Verbal shift change report given to Kerri Madden RN (oncoming nurse) by Zuri Alberto RN (offgoing nurse). Report included the following information SBAR, Kardex, Intake/Output, MAR and Recent Results.

## 2017-05-02 NOTE — PROGRESS NOTES
Problem: Ventilator Management  Goal: *Adequate oxygenation and ventilation  Outcome: Progressing Towards Goal  Vent Day 2        1640 Follow up note   - patient doing well post extubation   - Extubation:     - procedure was quick for patient protection     - patient awoke fast from sedation and immediately appeared to have bronchospasms from ET      - instead of re-sedation and failing :       - patient was talked thought procedure of extubation        - given post extubation bronchodilator for spasms        - patient is doing well at this time and being transferred to floors

## 2017-05-02 NOTE — MANAGEMENT PLAN
[facility]   Discharge Planning/ Assessment    Reasons for Intervention:   Patient ventilated and unable to be interviewed. Reviewed chart. Pt goes to [facility] for Primary Care. Looks like she has Medicaid # [id number]. Faxed to Registration to verify. In 2015 admission to [place] was using rolling walker for medical equipment. Per chart, next of kin is spouse Sabas Summers at [phone]. Care Management following and will develop plan with pt and/or spouse. Likely Home with Home Health unless Long Term was needed.  Medicaid has no SNF rehab benefit  Trupti Lugo RN BSN  Outcomes Manager  Pager # [phone]    High Risk Criteria  [x] Yes  []No   Physician Referral  [] Yes  [x]No        Date    Nursing Referral  [] Yes  [x]No        Date    Patient/Family Request  [] Yes  [x]No        Date       Resources:    Medicare  [] Yes  [x]No   Medicaid  [x] Yes  []No   No Resources  [] Yes  [x]No   Private Insurance  [] Yes  [x]No    Name/Phone Number    Other  [] Yes  [x]No        (i.e. Workman's Comp)         Prior Services:    Prior Services  [] Yes  [x]No   Home Health  [x] Yes  []No   Agency [id number]  [] Yes  [x]No        Number of [address] Program  [] Yes  [x]No       Meals on Wheels  [] Yes  [x]No   Office on Aging  [] Yes  [x]No   Transportation Services  [] Yes  [x]No   Nursing Home  [] Yes  [x]No        Nursing Home Name    [facility]  [] Yes  [x]No        [address] Name    Other       Information Source:      Information obtained from  [] Patient  [] Parent   [] [address]  [] Child  [] Spouse   [] Significant Other/Partner   [] Friend      [] EMS    [] Nursing Home Chart          [] Other:   Chart Review  [x] Yes  []No     Family/Support System:    Patient lives with  [] Alone    [] Spouse   [] Significant Other  [] Children  [] Caretaker   [] Parent  [] Sibling     [] Other       Other Support System:    Is the patient responsible for care of others  [] Yes  [x]No   Information of person caring for patient on  discharge    Managers financial affairs independently  [x] Yes  []No   If no, explain:      Status Prior to Admission:    Mental Status  [x] Awake  [x] Alert  [x] Oriented  [] Quiet/Calm [] Lethargic/Sedated   [] Disoriented  [] Restless/Anxious  [] Combative   Personal Care  [] Dependent  [x] [address]  [] Requires Assistance   Meal Preparation Ability  [x] Independent   [] Standby Assistance   [] Minimal Assistance   [] Moderate Assistance  [] Maximum Assistance     [] Total Assistance   Chores  [x] Independent with Chores   [] [address] [place] Resident   [] Requires Assistance   Bowel/Bladder  [x] Continent  [] Catheter  [] Incontinent  [] Ostomy Self-Care    [] Urine Diversion Self-Care  [] Maximum Assistance     [] Total Assistance   Number of Persons needed for assistance    DME at home  [] Orlando Cortez  [] Saw Cortez   [] Commode    [] Bathroom/Grab Bars  [] Hospital Bed  [] Nebulizer  [] Oxygen           [] Raised Toilet Seat  [] Shower Chair  [] Side Rails for Bed   [] Tub Transfer Bench   [x] Zac Heimlich  [] Sergio Rimes, Standard      [] Other:   Vendor      Treatment Presently Receiving:    Current Treatments  [] Chemotherapy  [] Dialysis  [] Insulin  [] IVAB [x] IVF   [x] O2  [] PCA   [] PT   [] RT   [] Tube Feedings   [] Wound Care     Psychosocial Evaluation:    Verbalized Knowledge of Disease Process  [] Patient  []Family   Coping with Disease Process  [] Patient  []Family   Requires Further Counseling Coping with Disease Process  [] Patient  []Family     Identified Projected Needs:    Home Health Aid  [] Yes  [x]No   Transportation  [] Yes  [x]No   Education  [] Yes  [x]No        Specific Education     Financial Counseling  [] Yes  [x]No   Inability to Care for Self/Will Require 24 hour care  [] Yes  [x]No   Pain Management  [] Yes  [x]No   Home Infusion Therapy  [] Yes  [x]No   Oxygen Therapy  [] Yes [x]No   DME  [] Yes  [x]No   Long Term Care Placement  [] Yes  [x]No   Rehab  [] Yes  [x]No   Physical Therapy  [x] Yes  []No   Needs Anticipated At This Time  [x] Yes  []No     Intra-Hospital Referral:    [address] [facility]  [] Yes  [x]No     [] Yes  [x]No   Patient Representative  [] Yes  [x]No   Staff for Teaching Needs  [] Yes  [x]No   Specialty Teaching Needs     Diabetic Educator  [] Yes  [x]No   Referral for Diabetic Educator Needed  [] Yes  [x]No  If Yes, place order for Nutritionist or Diabetic Consult     Tentative Discharge Plan:    Home with No Services  [] Yes  [x]No   Home with [address]  [] Yes  [x]No        If Yes, specify type    [address]  [x] Yes  []No        If Yes, specify type    Meals on Wheels  [] Yes  [x]No   Office of Aging  [] Yes  [x]No   NHP  [] Yes  [x]No   Return to the Nursing Home  [] Yes  [x]No   Rehab Therapy  [] Yes  [x]No   Acute Rehab  [] Yes  [x]No   Subacute Rehab  [] Yes  [x]No   Private Care  [] Yes  [x]No   Substance Abuse Referral  [] Yes  [x]No   Transportation  [] Yes  [x]No   Chore Service  [] Yes  [x]No   Inpatient Hospice  [] Yes  [x]No   OP RT  [] Yes  [x] No   OP Hemo  [] Yes  [x] No   OP PT  [] Yes  [x]No   Support Group  [] Yes  [x]No   Reach to Recovery  [] Yes  [x]No   OP Oncology Clinic  [] Yes  [x]No   Clinic Appointment  [] Yes  [x]No   DME  [] Yes  [x]No   Comments    Name of D/C Planner or  Given to Patient or Family Sonya Chiang RN BSN  Outcomes Manager  Pager # [phone]   Phone Number         Extension    Date 5/2/2017   Time 21    If you are discharged home, whom do you designate to participate in your discharge plan and receive any information needed?      Enter name of designee         Phone # of designee         Address of designee         Updated         Patient refused to designate any           individual

## 2017-05-02 NOTE — PROGRESS NOTES
Physical Therapy Screening:  Services are indicated and therapy order is required. When patient can participate with therapy and follow directions. [facility] for physical therapy based on results obtained during the nursing admission assessment. The patients chart was reviewed and the patient is appropriate for a skilled therapy evaluation. Please order a consult for physical therapy if you are in agreement and would like an evaluation to be completed. Thank you.     Radha Cosme, PT

## 2017-05-02 NOTE — PROGRESS NOTES
EEG review:  Diffuse slowing with underlying Beta due to drug effect. occasional right anterior temporal sharp waves. Spoke with RN to decrease as much they can her Diprivan due to low BP . However, RN said decrease caused some agitation so I asked that she talk to the hospitalist if needed for IV fluid to raise BP if needed.

## 2017-05-02 NOTE — PROGRESS NOTES
Neurology Progress Note      Patient: Izabela Parks MRN: [id number]  SSN: [id number]    YOB: 1955  Age: 57 y.o. Sex: female      Admit Date: 5/1/2017    LOS: 1 day     Subjective:     Chief Complaint:  Status epilepticus ([address]     continuous EEG without any electrographic seizures. Occasional right temporal shaps. Still on propofol and versed mostly now to tolerate the vent. Objective:     Vitals:    05/02/17 1100 05/02/17 1200 05/02/17 1256 05/02/17 1300   BP: 111/90 104/63  114/75   Pulse: 77 80 78 80   Resp: 21 21 20 23   Temp: 99.3 °F (37.4 °C) 99.7 °F (37.6 °C)  99.7 °F (37.6 °C)   SpO2: 100% 100% 100% 100%   Weight:       Height:                Physical Exam:   Intubated, sedated. Would grimaces and moves all extremities to noxious stimulus. Pupils 4 mm reactive.       Review of systems: she can't    Medications Reviewed:  Current Facility-Administered Medications   Medication Dose Route Frequency    dextrose 5% - 0.45% NaCl with KCl 20 mEq/L infusion  125 mL/hr IntraVENous CONTINUOUS    insulin lispro (HUMALOG) injection   SubCUTAneous Q6H    ELECTROLYTE REPLACEMENT PROTOCOL  1 Each Other PRN    ELECTROLYTE REPLACEMENT PROTOCOL  1 Each Other PRN    ELECTROLYTE REPLACEMENT PROTOCOL  1 Each Other PRN    ELECTROLYTE REPLACEMENT PROTOCOL  1 Each Other PRN    levETIRAcetam (KEPPRA) 1500 mg in 100 ml IVPB  1,500 mg IntraVENous Q12H    propofol (DIPRIVAN) infusion  5-50 mcg/kg/min IntraVENous TITRATE    midazolam (VERSED) 100 mg in 0.9% sodium chloride 100 mL infusion  2 mg/hr IntraVENous TITRATE    enoxaparin (LOVENOX) injection 40 mg  40 mg SubCUTAneous Q24H    pantoprazole (PROTONIX) 40 mg in sodium chloride 0.9 % 10 mL injection  40 mg IntraVENous DAILY    chlorhexidine (PERIDEX) 0.12 % mouthwash 10 mL  10 mL Oral Q12H    glucose chewable tablet 16 g  4 Tab Oral PRN    glucagon (GLUCAGEN) injection 1 mg  1 mg IntraMUSCular PRN    acetaminophen (TYLENOL) suppository 650 mg 650 mg Rectal Q6H PRN    dextrose (D50W) injection syrg 12.5-25 g  25-50 mL IntraVENous PRN     Past Medical History:   Diagnosis Date    Diabetes     Dyslipidemia     Hypertension     Noncompliance     Polysubstance abuse     ETOH,  marijuana    Psychosis     Pulmonary hypertension     RVSP 60mmHg (ECHO 6/15)    Seizure disorder      Social History     Social History    Marital status:      Spouse name: N/A    Number of children: N/A    Years of education: N/A     Occupational History    Not on file. Social History Main Topics    Smoking status: Current Every Day Smoker     Packs/day: 0.25    Smokeless tobacco: Not on file    Alcohol use Yes      Comment: occasional beer    Drug use: Yes     Special: Marijuana, Cocaine      Comment: last used [address] 2/25/15    Sexual activity: Not on file     Other Topics Concern    Not on file     Social History Narrative       Assessment/Plan:     Principal Problem:    Status epilepticus ([facility] [address] (5/1/2017)    Active Problems:    Diabetes ()      Hypertension ()      Dyslipidemia ()      Acute respiratory failure ([facility] [address] (5/1/2017)    History of localization related epilepsy. EEG with right temporal focus. Continue Keppra 1500 mg bid. TO taper gradually her sedation. Will d/c her EEG monitoring.         Signed By: Migue Cueto MD     May 2, 2017

## 2017-05-02 NOTE — PROGRESS NOTES
Progress Note      Patient: Berlin Correa               Sex: female          DOA: 5/1/2017       YOB: 1955      Age:  57 y.o.        LOS:  LOS: 1 day             CHIEF COMPLAINT:  Seizures with acute respiratory failure    Subjective:     Patient quiet on the vent  Not interacting    Objective:      Visit Vitals    /82    Pulse 71    Temp 100.2 °F (37.9 °C)    Resp 17    Ht 5' 4\" (1.626 m)    Wt 79.8 kg (176 lb)    SpO2 100%    BMI 30.21 kg/m2       Physical Exam:  Gen:  Patient is not communicating. Tolerating the ventilator well. Lungs:  Good aeration bilaterally with mechanical ventilator. No rales. No rhonchi  Heart:  Regular Rate and Rhythm. No murmur or gallop. No JVD. No edema. Abdomen:  Soft. No distention. No guarding or rigidity  Extremities: Good perfusion bilaterally. No edema  Neurological: Normal tone. No seizure activity.   Not interacting        Lab/Data Reviewed:  BMP:   Lab Results   Component Value Date/Time     05/02/2017 01:00 AM    K 4.2 05/02/2017 12:42 PM     (H) 05/02/2017 01:00 AM    CO2 20 (L) 05/02/2017 01:00 AM    AGAP 12 05/02/2017 01:00 AM    GLU 98 05/02/2017 01:00 AM    BUN 8 05/02/2017 01:00 AM    CREA 1.68 (H) 05/02/2017 01:00 AM    GFRAA 37 (L) 05/02/2017 01:00 AM    GFRNA 31 (L) 05/02/2017 01:00 AM     CBC:   Lab Results   Component Value Date/Time    WBC 8.4 05/02/2017 01:00 AM    HGB 13.1 05/02/2017 01:00 AM    HCT 39.2 05/02/2017 01:00 AM     05/02/2017 01:00 AM           Assessment/Plan     Principal Problem:    Status epilepticus ([facility] [address] (5/1/2017)    Active Problems:    Acute respiratory failure ([facility] [address] (5/1/2017)      Diabetes ()      Hypertension ()      Dyslipidemia ()        Plan:  Continue with supportive care on the ventilator  Anticonvulsant care  BS control  Monitor mental status  GI prophylaxis  DVT prophylaxis

## 2017-05-03 ENCOUNTER — APPOINTMENT (OUTPATIENT)
Dept: GENERAL RADIOLOGY | Age: 62
DRG: 100 | End: 2017-05-03
Attending: PHYSICIAN ASSISTANT
Payer: SELF-PAY

## 2017-05-03 LAB
ANION GAP BLD CALC-SCNC: 11 MMOL/L (ref 3–18)
BUN SERPL-MCNC: 7 MG/DL (ref 7–18)
BUN/CREAT SERPL: 4 (ref 12–20)
CA-I SERPL-SCNC: 1.16 MMOL/L (ref 1.12–1.32)
CALCIUM SERPL-MCNC: 8.7 MG/DL (ref 8.5–10.1)
CHLORIDE SERPL-SCNC: 116 MMOL/L (ref 100–108)
CO2 SERPL-SCNC: 20 MMOL/L (ref 21–32)
CREAT SERPL-MCNC: 1.95 MG/DL (ref 0.6–1.3)
GLUCOSE BLD STRIP.AUTO-MCNC: 100 MG/DL (ref 70–110)
GLUCOSE BLD STRIP.AUTO-MCNC: 104 MG/DL (ref 70–110)
GLUCOSE BLD STRIP.AUTO-MCNC: 111 MG/DL (ref 70–110)
GLUCOSE BLD STRIP.AUTO-MCNC: 112 MG/DL (ref 70–110)
GLUCOSE BLD STRIP.AUTO-MCNC: 141 MG/DL (ref 70–110)
GLUCOSE SERPL-MCNC: 89 MG/DL (ref 74–99)
LEVETIRACETAM SERPL-MCNC: 11.3 UG/ML (ref 10–40)
LEVETIRACETAM SERPL-MCNC: 46.6 UG/ML (ref 10–40)
MAGNESIUM SERPL-MCNC: 2.1 MG/DL (ref 1.6–2.6)
PHOSPHATE SERPL-MCNC: 2.9 MG/DL (ref 2.5–4.9)
POTASSIUM SERPL-SCNC: 4.1 MMOL/L (ref 3.5–5.5)
SODIUM SERPL-SCNC: 147 MMOL/L (ref 136–145)

## 2017-05-03 PROCEDURE — 65270000029 HC RM PRIVATE

## 2017-05-03 PROCEDURE — 51798 US URINE CAPACITY MEASURE: CPT

## 2017-05-03 PROCEDURE — 82330 ASSAY OF CALCIUM: CPT | Performed by: HOSPITALIST

## 2017-05-03 PROCEDURE — 74011000250 HC RX REV CODE- 250: Performed by: INTERNAL MEDICINE

## 2017-05-03 PROCEDURE — 84100 ASSAY OF PHOSPHORUS: CPT | Performed by: HOSPITALIST

## 2017-05-03 PROCEDURE — 36415 COLL VENOUS BLD VENIPUNCTURE: CPT | Performed by: HOSPITALIST

## 2017-05-03 PROCEDURE — 74011250637 HC RX REV CODE- 250/637: Performed by: HOSPITALIST

## 2017-05-03 PROCEDURE — 71010 XR CHEST PORT: CPT

## 2017-05-03 PROCEDURE — 74011000250 HC RX REV CODE- 250: Performed by: HOSPITALIST

## 2017-05-03 PROCEDURE — 94003 VENT MGMT INPAT SUBQ DAY: CPT

## 2017-05-03 PROCEDURE — 92610 EVALUATE SWALLOWING FUNCTION: CPT

## 2017-05-03 PROCEDURE — 76937 US GUIDE VASCULAR ACCESS: CPT | Performed by: HOSPITALIST

## 2017-05-03 PROCEDURE — 83735 ASSAY OF MAGNESIUM: CPT | Performed by: HOSPITALIST

## 2017-05-03 PROCEDURE — 80048 BASIC METABOLIC PNL TOTAL CA: CPT | Performed by: HOSPITALIST

## 2017-05-03 PROCEDURE — 36569 INSJ PICC 5 YR+ W/O IMAGING: CPT | Performed by: HOSPITALIST

## 2017-05-03 PROCEDURE — 74011250637 HC RX REV CODE- 250/637: Performed by: PHYSICIAN ASSISTANT

## 2017-05-03 PROCEDURE — 74011250636 HC RX REV CODE- 250/636: Performed by: EMERGENCY MEDICINE

## 2017-05-03 PROCEDURE — 77030018786 HC NDL GD F/USND BARD -B

## 2017-05-03 PROCEDURE — 74011250636 HC RX REV CODE- 250/636: Performed by: PHYSICIAN ASSISTANT

## 2017-05-03 PROCEDURE — C1751 CATH, INF, PER/CENT/MIDLINE: HCPCS

## 2017-05-03 PROCEDURE — 82962 GLUCOSE BLOOD TEST: CPT

## 2017-05-03 PROCEDURE — 77030018719 HC DRSG PTCH ANTIMIC J&J -A

## 2017-05-03 PROCEDURE — C9113 INJ PANTOPRAZOLE SODIUM, VIA: HCPCS | Performed by: PHYSICIAN ASSISTANT

## 2017-05-03 PROCEDURE — 74011000250 HC RX REV CODE- 250: Performed by: PHYSICIAN ASSISTANT

## 2017-05-03 PROCEDURE — 74011250636 HC RX REV CODE- 250/636: Performed by: HOSPITALIST

## 2017-05-03 RX ORDER — LIDOCAINE HYDROCHLORIDE 10 MG/ML
1-5 INJECTION INFILTRATION; PERINEURAL
Status: COMPLETED | OUTPATIENT
Start: 2017-05-03 | End: 2017-05-03

## 2017-05-03 RX ORDER — SODIUM CHLORIDE 0.9 % (FLUSH) 0.9 %
10 SYRINGE (ML) INJECTION EVERY 24 HOURS
Status: DISCONTINUED | OUTPATIENT
Start: 2017-05-03 | End: 2017-05-08 | Stop reason: HOSPADM

## 2017-05-03 RX ORDER — LORAZEPAM 2 MG/ML
1 INJECTION INTRAMUSCULAR ONCE
Status: COMPLETED | OUTPATIENT
Start: 2017-05-03 | End: 2017-05-03

## 2017-05-03 RX ORDER — SODIUM CHLORIDE 0.9 % (FLUSH) 0.9 %
20 SYRINGE (ML) INJECTION EVERY 24 HOURS
Status: DISCONTINUED | OUTPATIENT
Start: 2017-05-03 | End: 2017-05-08 | Stop reason: HOSPADM

## 2017-05-03 RX ORDER — BACITRACIN 500 UNIT/G
1 PACKET (EA) TOPICAL AS NEEDED
Status: DISCONTINUED | OUTPATIENT
Start: 2017-05-03 | End: 2017-05-08 | Stop reason: HOSPADM

## 2017-05-03 RX ORDER — SODIUM CHLORIDE 0.9 % (FLUSH) 0.9 %
10-30 SYRINGE (ML) INJECTION AS NEEDED
Status: DISCONTINUED | OUTPATIENT
Start: 2017-05-03 | End: 2017-05-08 | Stop reason: HOSPADM

## 2017-05-03 RX ORDER — SODIUM CHLORIDE 0.9 % (FLUSH) 0.9 %
10-40 SYRINGE (ML) INJECTION EVERY 8 HOURS
Status: DISCONTINUED | OUTPATIENT
Start: 2017-05-03 | End: 2017-05-08 | Stop reason: HOSPADM

## 2017-05-03 RX ORDER — LEVETIRACETAM 500 MG/1
1500 TABLET ORAL 2 TIMES DAILY
Status: DISCONTINUED | OUTPATIENT
Start: 2017-05-03 | End: 2017-05-08 | Stop reason: HOSPADM

## 2017-05-03 RX ORDER — IPRATROPIUM BROMIDE AND ALBUTEROL SULFATE 2.5; .5 MG/3ML; MG/3ML
3 SOLUTION RESPIRATORY (INHALATION)
Status: COMPLETED | OUTPATIENT
Start: 2017-05-03 | End: 2017-05-03

## 2017-05-03 RX ORDER — SODIUM CHLORIDE 0.9 % (FLUSH) 0.9 %
10 SYRINGE (ML) INJECTION AS NEEDED
Status: DISCONTINUED | OUTPATIENT
Start: 2017-05-03 | End: 2017-05-08 | Stop reason: HOSPADM

## 2017-05-03 RX ADMIN — ENOXAPARIN SODIUM 40 MG: 40 INJECTION SUBCUTANEOUS at 18:24

## 2017-05-03 RX ADMIN — LIDOCAINE HYDROCHLORIDE 2 ML: 10 INJECTION, SOLUTION INFILTRATION; PERINEURAL at 12:15

## 2017-05-03 RX ADMIN — DEXTROSE MONOHYDRATE, SODIUM CHLORIDE, AND POTASSIUM CHLORIDE 125 ML/HR: 50; 4.5; 1.49 INJECTION, SOLUTION INTRAVENOUS at 20:19

## 2017-05-03 RX ADMIN — PROPOFOL 40 MCG/KG/MIN: 10 INJECTION, EMULSION INTRAVENOUS at 02:52

## 2017-05-03 RX ADMIN — Medication 10 ML: at 23:08

## 2017-05-03 RX ADMIN — Medication 10 ML: at 14:34

## 2017-05-03 RX ADMIN — LEVETIRACETAM 1500 MG: 500 TABLET ORAL at 18:23

## 2017-05-03 RX ADMIN — DEXTROSE MONOHYDRATE, SODIUM CHLORIDE, AND POTASSIUM CHLORIDE 125 ML/HR: 50; 4.5; 1.49 INJECTION, SOLUTION INTRAVENOUS at 13:54

## 2017-05-03 RX ADMIN — Medication 20 ML: at 14:34

## 2017-05-03 RX ADMIN — CHLORHEXIDINE GLUCONATE 10 ML: 1.2 RINSE ORAL at 08:34

## 2017-05-03 RX ADMIN — LEVETIRACETAM 1500 MG: 500 TABLET ORAL at 13:05

## 2017-05-03 RX ADMIN — SODIUM CHLORIDE 40 MG: 9 INJECTION INTRAMUSCULAR; INTRAVENOUS; SUBCUTANEOUS at 08:35

## 2017-05-03 RX ADMIN — LORAZEPAM 1 MG: 2 INJECTION INTRAMUSCULAR; INTRAVENOUS at 23:08

## 2017-05-03 RX ADMIN — IPRATROPIUM BROMIDE AND ALBUTEROL SULFATE 3 ML: .5; 3 SOLUTION RESPIRATORY (INHALATION) at 09:00

## 2017-05-03 RX ADMIN — DEXTROSE MONOHYDRATE, SODIUM CHLORIDE, AND POTASSIUM CHLORIDE 125 ML/HR: 50; 4.5; 1.49 INJECTION, SOLUTION INTRAVENOUS at 07:03

## 2017-05-03 NOTE — PROCEDURES
[facility]    Name:  Kary Ortega  MR#:  [id number]  :  1955  Account #:  [de-identified]  Date of Adm:  2017  Date of Service:  2017      EEG #    REFERRING PHYSICIAN: Milly Scott MD    This long term video EEG was requested for this 18-year-old woman  with history of localization related epilepsy, who present with status  epilepticus, and so she was intubated and placed on Versed and  Diprivan drips, and continued on Keppra, to rule out any break through  electrographic seizures. The international 10/20 system was used during this recording. The recording started on 2017 at 5:51 p.m., and ended on  2017 at 1:40 p.m. The EEG was obtained with the patient in the intubated and sedated  state. The recording consisted of diffuse suppression in amplitude and  frequency. When we increased the sensitivity to 3 microvolts, we were  able to see low voltage beta activity over the bifrontal head region. Photic stimulation did not produce any significant changes in tracing. There were no electrographic seizures during this recording. There was occasional right anterior temporal sharp waves. CLINICAL INTERPRETATION: This prolonged video EEG recording  was obtained with the patient in the intubated and sedated state, and  showed diffuse  slowing of background activity that could be due to  drug effect. There was occasional right temporal sharp waves that  indicated underlying neuro dysfunction that is potentially epileptogenic.         MD Imani Lovett / Jillian.Side  D:  2017   16:15  T:  2017   20:30  Job #:  [id number]

## 2017-05-03 NOTE — PROGRESS NOTES
Speech Therapy Screening:  Services are indicated and therapy order is requested. An InBasket screening referral was triggered for speech therapy based on results obtained during the nursing admission assessment. The patients chart was reviewed and the patient is appropriate for a skilled therapy evaluation. Please order a consult for speech therapy if you are in agreement and would like an evaluation to be completed. Thank you.     Staci Aguirre, SLP

## 2017-05-03 NOTE — PROGRESS NOTES
[facility]  Pulmonary, Critical Care, and Sleep Medicine    Name: Lucas Kemp MRN: [id number]   : 1955 Hospital: [address] [place] [address]   Date: 5/3/2017          Subjective/Interval History:     Awake and alert   Extubated in am and doing well  No sig secretions   Good cough   No cp or sob   On 2 lpm O2   Initially mild stridor but has resolved       Objective:   Vital Signs:    Visit Vitals    /75    Pulse 76    Temp 99.9 °F (37.7 °C)    Resp 16    Ht 5' 4\" (1.626 m)    Wt 79.8 kg (176 lb)    SpO2 95%    BMI 30.21 kg/m2       O2 Device: Ventilator   O2 Flow Rate (L/min): 15 l/min   Temp (24hrs), Av.1 °F (37.8 °C), Min:99.3 °F (37.4 °C), Max:100.6 °F (38.1 °C)       Intake/Output:   Last shift:      701 -  190  In: 83.3 [I.V.:83.3]  Out: 140 [Urine:140]  Last 3 shifts:  190 -  0700  In: 5778.6 [I.V.:5778.6]  Out: 9565 [Urine:2865]    Intake/Output Summary (Last 24 hours) at 17 0847  Last data filed at 17 0740   Gross per 24 hour   Intake          3609.95 ml   Output             2240 ml   Net          1369.95 ml        Physical Exam:    General: in no apparent distress   HEENT: Normal   Neck: No abnormally enlarged lymph nodes.    Chest: normal   Lungs: normal air entry   Heart: Regular rate and rhythm   Abdomen: abdomen is soft without significant tenderness, masses, organomegaly or guarding   Extremity: negative   Neuro: alert   Skin: Skin color, texture, turgor normal. No rashes or lesions        DATA:  Labs:  Recent Labs      17   0100  17   1100   WBC  8.4  9.1   HGB  13.1  15.5   HCT  39.2  47.2*   PLT  140  170     Recent Labs      17   0400  17   1242  17   0100  17   1100   NA  147*   --   145  136  141   K  4.1  4.2  3.4*  6.2*  3.9   CL  116*   --   113*  109*  106   CO2  20*   --   20*  21  9*   GLU  89   --   98  659*  279*   BUN  7   --   8  7  6*   CREA  1.95* --   1.68*  1.28  1.34*   CA  8.7   --   8.1*  7.2*  9.4   MG  2.1  2.3   --    --   2.5   PHOS  2.9  2.0*   --    --    --    ALB   --    --   2.9*   --   3.5   SGOT   --    --   74*   --   87*   ALT   --    --   64*   --   89*   INR   --    --    --    --   1.2     No results for input(s): PH, PCO2, PO2, HCO3, FIO2 in the last 72 hours. PFT:                                                       Echo Results  (Last 48 hours)               17 0000  2D ECHO COMPLETE ADULT (TTE) W OR WO CONTR Final result    Narrative:  [address] [place] [address]   [phone]       LimitedTransthoracic Echocardiogram       Patient: Kenya Romero   MRN: [id number]   [address] #: [de-identified]   : 1955   Age: 57 years   Gender: Female   Height: 66 in   Weight: 175.6 lb   BSA: 1.89 m-sq   BP: 90 / 62 mmHg   Study date: 02-May-2017   Status: Routine   Location: Bedside   [facility] ACC #: [id number]       Allergies: ASPIRIN, PENICILLINS       Referring_Ordering Physician:  Judge Bella. Reno Mari, [facility]   Interpreting Physician:  Katharine Caballero MD   Interpreting Group:  [facility]   Technologist:  Mardee Dancer, [facility], [facility]       SUMMARY:   Procedure information: Patient uncooperative during exam, restrained, and   thrashed during exam. Limited images obtained. Left ventricle: Systolic function was normal. Ejection fraction was   estimated in the range of 55 % to 60 %. Right ventricle: Systolic function was reduced. COMPARISONS:   There has been no significant change. Comparison was made with the   previous study of 15-Joce-2015. INDICATIONS: Elevated troponin       PROCEDURE: This was a routine study. Patient uncooperative during exam,   restrained, and thrashed during exam. Limited images obtained. The study   included limited 2D imaging and complete spectral Doppler. The heart rate   was 66 bpm, at the start of the study.  Systolic blood pressure was 90   mmHg, at the start of the study. Diastolic blood pressure was 62 mmHg, at   the start of the study. Images were not obtained from the subcostal or   suprasternal notch acoustic windows. This was a technically difficult   study. LEFT VENTRICLE: Size was normal. Systolic function was normal. Ejection   fraction was estimated in the range of 55 % to 60 %. Wall thickness was   normal. DOPPLER: Left ventricular diastolic function parameters were   normal.       RIGHT VENTRICLE: Systolic function was reduced. Not well visualized. DOPPLER: Systolic pressure was within the normal range. Estimated peak   pressure was in the range of 10 mmHg to 15 mmHg. LEFT ATRIUM: Size was normal. LA volume index was 15 ml/m-sq. RIGHT ATRIUM: Not well visualized. MITRAL VALVE: There was annular calcification. There was minimal   thickening. There was minimal calcification. DOPPLER: There was no   evidence for stenosis. There was trivial regurgitation. AORTIC VALVE: The valve was trileaflet. Leaflets exhibited normal   thickness and normal cuspal separation. DOPPLER: Transaortic velocity was   within the normal range. There was no stenosis. There was no regurgitation. TRICUSPID VALVE: Normal valve structure. There was normal leaflet   separation. DOPPLER: The transtricuspid velocity was within the normal   range. There was no evidence for tricuspid stenosis. There was trivial   regurgitation. PULMONIC VALVE: Not well visualized, but normal Doppler findings. DOPPLER:   There was no stenosis. There was no regurgitation. AORTA: The root exhibited normal size. SYSTEMIC VEINS: IVC: The inferior vena cava was not well visualized. PERICARDIUM: There was no pericardial effusion identified in the limited   views obtained.        SYSTEM MEASUREMENT TABLES       2D   Ao Diam: 3.2 cm   LVOT Diam: 2 cm   IVSd: 1.5 cm   LVIDd: 5.1 cm   LVIDs: 2.9 cm   LVPWd: 1.2 cm   LAAs A2C: 6.5 cm2   LAAs A4C: 15.3 cm2 LAESV Index (A-L): 15.4 ml/m2       CW   TR Vmax: 1.2 m/s   TR maxP.4 mmHg       MM   TAPSE: 1.5 cm       PW   E': 0.1 m/s   E/E': 11.3   MV Dec [place]: 7.7 m/s2   MV DecT: 133.2 ms   MV E Luis: 1 m/s   MV E/A Ratio: 1.1   MV PHT: 38.6 ms   MVA By PHT: 5.7 cm2       Prepared and E-signed by       Alok Darby MD   Signed 02-May-2017 13:43:59                   Imaging:  [x]I have personally reviewed the patients radiographs  []Radiographs reviewed with radiologist   []No change from prior, tubes and lines in adequate position  []Improved   []Worsening    CXR Results  (Last 48 hours)               17 0205  XR CHEST PORT Final result    Impression:  IMPRESSION:       Stable endotracheal tube. No acute cardiopulmonary disease. Narrative:  CHEST AP PORTABLE       Indication: Respiratory failure. Comparison: 2017. Findings: Endotracheal tube tip is 2.3 cm from the claudette. Monitoring leads   overlie the chest. The lungs appear clear. No evidence for pneumothorax or   pleural effusion. Cardiac silhouette and pulmonary vascularity appear within   normal limits. 17 0045  XR CHEST PORT Final result    Impression:  IMPRESSION:       Endotracheal tube tip is 1.6 cm from the claudette, consider retraction by 2.3 cm. No acute cardiopulmonary disease. Narrative:  CHEST AP PORTABLE       Indication: Respiratory distress, endotracheal tube. Comparison: 2017. Findings: Endotracheal tube tip appears approximately 1.6 cm from the claudette. Monitoring leads overlie the chest. The lungs appear clear. No evidence for   pneumothorax or pleural effusion. Cardiac silhouette and pulmonary vascularity   appear within normal limits.            17 1449  XR CHEST PORT Final result    Impression:  IMPRESSION:       Endotracheal tube now in satisfactory position with considerable improvement of   left lung atelectasis, mild residual linear atelectasis left lung base   laterally. Narrative:  EXAM: Portable AP supine chest, one view       INDICATION: Endotracheal tube placement       COMPARISON: Earlier the same day       _______________       FINDINGS:       Endotracheal tube has been pulled back, now 4.0 cm above the claudette. Cardiac   silhouette and pulmonary vessels are normal. Previous atelectatic changes of the   left lung have nearly completely resolved, with mild residual linear atelectasis   lateral left lung base. Right lung remains clear. No pneumothorax. Bone islands   are noted in the left humeral head.       _______________           05/01/17 1224  XR CHEST PORT Final result    Impression:  IMPRESSION:       Right mainstem bronchus intubation with left lung atelectasis. Telephone report   was called to Dr. Azeem Bueno at 1:12 PM on 5/1/2017, Dr. Azeem Bueno was already aware. Narrative:  EXAM: Portable semierect chest, one view       INDICATION: Seizures for one and half hours at home this morning. Endotracheal   tube placement. COMPARISON: 6/18/2015       _______________       FINDINGS:       Endotracheal tube is present with its tip approximately 10 mm into the right   mainstem bronchus. Cardiac silhouette and pulmonary vessels are normal.   Atelectatic changes of the left lung, medial left upper lung and across the left   lung base are present with mediastinal shift to the left. Right lung is clear. Right costophrenic angle is sharp.       _______________                 CT Results  (Last 48 hours)               05/01/17 1140  CT HEAD WO CONT Final result    Impression:  IMPRESSION:       No acute intracranial abnormalities. Narrative:  EXAM: CT head       INDICATION: Seizures for approximately 1 1/2 hours that started around 0930   hours today.        COMPARISON: 6/13/2015       TECHNIQUE: Axial CT imaging of the head was performed without intravenous   contrast.  Sagittal and coronal reconstructions were created from the axial data.  One or more dose reduction techniques were used on this CT: automated   exposure control, adjustment of the mAs and/or kVp according to patient's size,   and iterative reconstruction techniques. The specific techniques utilized on   this CT exam have been documented in the patient's electronic medical record.       _______________       FINDINGS:       BRAIN AND POSTERIOR FOSSA: The sulci, folia, ventricles and basal cisterns are   within normal limits for the patient?s age. No intracranial hemorrhage, mass   effect, or midline shift. No areas of abnormal parenchymal attenuation. EXTRA-AXIAL SPACES AND MENINGES: No extra-axial fluid collections. CALVARIUM: Intact. SINUSES: Small rounded mucosal opacities are present in the maxillary sinuses   bilaterally without change, mucous retention cysts and/or polyps. OTHER: No acute changes.        _______________                   IMPRESSION:   · Status epilepticus-in setting of missed dose AED  · MV for airway protection due to above[de-identified]  resolved   · Possible aspiration  · HAGMA  · UDS +opioids, benzo, THC  · Elevated LFT  · PRISCILLA  · DM  · Seizure d/o     Others  · Hx HTN  · Medical noncompliance  · Polysubstance abuse  · Pulmonary HTN  · Tobacco use       RECOMMENDATIONS: ·   Doing well post extubation   · On Low flow O2 and good cough and no sig secretions   · Continue AED   · Prophylaxis - DVT-lovenox, GI-protonix     ·       PLAN:   · Can be transferred out and will defer to IM   · Please call if needed   · Thanks      Chino Santoyo MD

## 2017-05-03 NOTE — CONSULTS
[facility]  DOS: Tues 5/2/2017    Events: sedated; awaiting clearance from neuro for dc all IV sedation & SBT for possible extubation      IMPRESSION:   # Status epilepticus-in setting of missed dose AED    * MV for airway protection due to above    * Possible aspiration    * propofol & versed infusions until cleared by neurology. * EEG showed some sharp wave activity that could be epileptogenic    * UDS +opioids, benzo, THC    # Resp:     -  Vent bundle. No weaning sedation or vent until cleared by neuro/EEG. Daily CXR and ABG    # CV     * stable. Last ECHO 6/15: EF 45-50% with hypokinesis anteroseptal and inferoseptal walls. RVSP 60. Repeat cardiac enzymes. # Heme/Onc- hgb, plt and INR stable   * lovenox VTEP    # ID - No evidence infection. Trend temps, WBC. High risk aspiration-sputum cx    # Renal     * HAGMA    * ? PRISCILLA     * Trend BUN, Cr, CO2. Baker for I/O. Check LA, salicylate, etoh levels    * protonix    # GI    * Elevated LFT - relationship to seizure meds? Other etiol? * FSG with SSI prn, may need levemir added    * NPO for now; trend LFT    # Others  · Hx HTN  · Medical noncompliance  · Polysubstance abuse  · Pulmonary HTN  · Tobacco use         HPI (5/1/2017)   This patient has been seen and evaluated at the request of Dr. Bhargavi Montez for vent management in Bed 2606. Patient is a 57 y.o. female with PMH DM, HTN, medical noncompliance, polysubstance abuse, etoh use, psychosis, pulmonary HTN, tobacco use, seizure disorder who presented to the ED via EMS with seizure at home. Pt was found unresponsive and diaphoretic by EMS and per report,  stated that seizure was approx 1 hour. In the ED she remained unresponsive and was intubated for airway protection. She was loaded with keppra and tele neuro and neurology were consulted. CT head was negative for acute process. CXR following repositioning of ETT showed improved L sided atelectasis.  Labs notable for CO2 9, Gap 26, glucose 279, Cr 1.34, ALT 89, AST 87, trop 0.04. She was started on versed and propofol. Pt was admitted to the ICU for further evaluation and management. Past Medical History:   Diagnosis Date    Diabetes     Dyslipidemia     Hypertension     Noncompliance     Polysubstance abuse     ETOH,  marijuana    Psychosis     Pulmonary hypertension     RVSP 60mmHg (ECHO 6/15)    Seizure disorder       No past surgical history on file. Prior to Admission medications    Medication Sig Start Date End Date Taking? Authorizing Provider   adalimumab (HUMIRA PEN) 40 mg/0.8 mL pnkt 40 mg by SubCUTAneous route. Indications: RHEUMATOID ARTHRITIS    Yaa Robles MD   oxyCODONE-acetaminophen (PERCOCET) 5-325 mg per tablet Take 1 tablet every 4-6 hours as needed for pain control. If you were instructed to try over the counter ibuprofen or tylenol, only take the percocet for pain not controlled with the over the counter medication. 7/23/15   MIGUEL Arora   atorvastatin (LIPITOR) 20 mg tablet Take 1 Tab by mouth nightly. 6/19/15   Kristan Crisostomo MD   ondansetron hcl (ZOFRAN, AS HYDROCHLORIDE,) 4 mg tablet Take 1 Tab by mouth every eight (8) hours as needed for Nausea. 6/19/15   Eduardo Brasher MD   loratadine (CLARITIN) 10 mg tablet Take 10 mg by mouth daily. Historical Provider   Omega-3 Fatty Acids-Fish Oil (FISH OIL) 360-1,200 mg cpDR Take 1,200 mg by mouth. Yaa Robles MD   CALCIUM CARB/VIT D3/MINERALS (CALCIUM-VITAMIN D PO) Take 1,200 mg by mouth. Yaa Robles MD   famotidine (PEPCID) 20 mg tablet Take 20 mg by mouth two (2) times a day. Yaa Robles MD   OLANZapine (ZYPREXA) 5 mg tablet Take 5 mg by mouth nightly. Yaa Robles MD   sertraline (ZOLOFT) 100 mg tablet Take 100 mg by mouth daily. Yaa Robles MD   amLODIPine (NORVASC) 5 mg tablet Take 5 mg by mouth daily. Yaa Robles MD   folic acid (FOLVITE) 1 mg tablet Take 1 mg by mouth daily.     Yaa Robles MD     Current Facility-Administered Medications   Medication Dose Route Frequency    albuterol-ipratropium (DUO-NEB) 2.5 MG-0.5 MG/3 ML  3 mL Nebulization NOW    dextrose 5% - 0.45% NaCl with KCl 20 mEq/L infusion  125 mL/hr IntraVENous CONTINUOUS    insulin lispro (HUMALOG) injection   SubCUTAneous Q6H    levETIRAcetam (KEPPRA) 1500 mg in 100 ml IVPB  1,500 mg IntraVENous Q12H    propofol (DIPRIVAN) infusion  5-50 mcg/kg/min IntraVENous TITRATE    midazolam (VERSED) 100 mg in 0.9% sodium chloride 100 mL infusion  2 mg/hr IntraVENous TITRATE    enoxaparin (LOVENOX) injection 40 mg  40 mg SubCUTAneous Q24H    pantoprazole (PROTONIX) 40 mg in sodium chloride 0.9 % 10 mL injection  40 mg IntraVENous DAILY    chlorhexidine (PERIDEX) 0.12 % mouthwash 10 mL  10 mL Oral Q12H     Allergies   Allergen Reactions    Aspirin Swelling    Penicillins Unknown (comments)      Social History   Substance Use Topics    Smoking status: Current Every Day Smoker     Packs/day: 0.25    Smokeless tobacco: Not on file    Alcohol use Yes      Comment: occasional beer      No family history on file. Review of Systems:  Review of systems not obtained due to patient factors.     Objective:   Vital Signs:    Visit Vitals    /81    Pulse 87    Temp (!) 37.6 °F (3.1 °C)    Resp (!) 31    Ht 5' 4\" (1.626 m)    Wt 79.8 kg (176 lb)    SpO2 100%    BMI 30.21 kg/m2       O2 Device: Ventilator   O2 Flow Rate (L/min): 15 l/min   Temp (24hrs), Av.2 °F (34 °C), Min:37.6 °F (3.1 °C), Max:100.6 °F (38.1 °C)       Intake/Output:   Last shift:      701 - 1900  In: 395.8 [I.V.:395.8]  Out: 550 [Urine:550]  Last 3 shifts: 1901 -  0700  In: 5778.6 [I.V.:5778.6]  Out: 2865 [Urine:2865]    Intake/Output Summary (Last 24 hours) at 17 1105  Last data filed at 17 1010   Gross per 24 hour   Intake          3495.61 ml   Output             2200 ml   Net          1295.61 ml       Ventilator Settings:  Mode Rate Tidal Volume Pressure FiO2 PEEP   Assist control, VC+   400 ml    30 % 7 cm H20     Peak airway pressure: 25 cm H2O    Minute ventilation: 6.91 l/min      ARDS network Guidelines: Lung protective strategy and Pl pressure goals      Physical Exam:  Unchanged from admission to ICU. Appears comfortable synchronous with vent    General:  Intubated and sedated on vent, PERRL   Head:  Normocephalic, without obvious abnormality, atraumatic. Eyes:  Conjunctivae/corneas clear. Nose: Nares normal.    Throat: Lips, mucosa, and tongue normal. ETT in place   Neck: Supple, symmetrical       Lungs:   Clear to auscultation bilaterally, good AE, no wheeze or rhonchi   Chest wall:  No tenderness or deformity. Heart:  Regular, no muurmur   Abdomen:   Soft, non-tender. +obese Bowel sounds normal. No masses. Extremities: Extremities normal, no edema  : meyer in place, clear yellow urine, small clot in tubing   Pulses: 2+ and symmetric all extremities.    Skin: Skin color, texture, turgor normal. Excoriated area L knee               Data:     Recent Results (from the past 24 hour(s))   GLUCOSE, POC    Collection Time: 05/02/17 12:09 PM   Result Value Ref Range    Glucose (POC) 112 (H) 70 - 110 mg/dL   POTASSIUM    Collection Time: 05/02/17 12:42 PM   Result Value Ref Range    Potassium 4.2 3.5 - 5.5 mmol/L   MAGNESIUM    Collection Time: 05/02/17 12:42 PM   Result Value Ref Range    Magnesium 2.3 1.6 - 2.6 mg/dL   PHOSPHORUS    Collection Time: 05/02/17 12:42 PM   Result Value Ref Range    Phosphorus 2.0 (L) 2.5 - 4.9 MG/DL   CALCIUM, IONIZED    Collection Time: 05/02/17  1:13 PM   Result Value Ref Range    Ionized Calcium 1.07 (L) 1.12 - 1.32 MMOL/L   GLUCOSE, POC    Collection Time: 05/02/17  5:34 PM   Result Value Ref Range    Glucose (POC) 110 70 - 110 mg/dL   GLUCOSE, POC    Collection Time: 05/03/17 12:17 AM   Result Value Ref Range    Glucose (POC) 100 70 - 443 mg/dL   METABOLIC PANEL, BASIC    Collection Time: 05/03/17  4:00 AM   Result Value Ref Range    Sodium 147 (H) 136 - 145 mmol/L    Potassium 4.1 3.5 - 5.5 mmol/L    Chloride 116 (H) 100 - 108 mmol/L    CO2 20 (L) 21 - 32 mmol/L    Anion gap 11 3.0 - 18 mmol/L    Glucose 89 74 - 99 mg/dL    BUN 7 7.0 - 18 MG/DL    Creatinine 1.95 (H) 0.6 - 1.3 MG/DL    BUN/Creatinine ratio 4 (L) 12 - 20      GFR est AA 32 (L) >60 ml/min/1.73m2    GFR est non-AA 26 (L) >60 ml/min/1.73m2    Calcium 8.7 8.5 - 10.1 MG/DL   PHOSPHORUS    Collection Time: 17  4:00 AM   Result Value Ref Range    Phosphorus 2.9 2.5 - 4.9 MG/DL   MAGNESIUM    Collection Time: 17  4:00 AM   Result Value Ref Range    Magnesium 2.1 1.6 - 2.6 mg/dL   CALCIUM, IONIZED    Collection Time: 17  4:00 AM   Result Value Ref Range    Ionized Calcium 1.16 1.12 - 1.32 MMOL/L   GLUCOSE, POC    Collection Time: 17  6:04 AM   Result Value Ref Range    Glucose (POC) 104 70 - 110 mg/dL             Telemetry:normal sinus rhythm    Imagin2017 AM: unremarkable OETT no inflitrates    17 CXR    Endotracheal tube now in satisfactory position with considerable improvement of  left lung atelectasis, mild residual linear atelectasis left lung base  laterally.      CT head 17  No acute intracranial abnormalities       Shahnaz Blake MD

## 2017-05-03 NOTE — PROGRESS NOTES
Progress Note      Patient: Kimberley Mask               Sex: female          DOA: 5/1/2017       YOB: 1955      Age:  57 y.o.        LOS:  LOS: 2 days             CHIEF COMPLAINT:  Seizures with improved respiratory failure    Subjective:     Patient is off the ventilator. She will speak and interact    Objective:      Visit Vitals    /72    Pulse 92    Temp (!) 37.8 °F (3.2 °C)    Resp 27    Ht 5' 4\" (1.626 m)    Wt 79.8 kg (176 lb)    SpO2 100%    BMI 30.21 kg/m2       Physical Exam:  Gen:  Patient is in no distress. No complaint  HEENT and Neck:  PERRL, No cervical tenderness or masses  Lungs:  Clear bilaterally. No rales, no wheeze. Normal effort. Heart:  Regular Rate and Rhythm. No murmur or gallop. No JVD. No edema.   Abdomen:  Soft, non tender, no masses, no Hepatosplenomegally  Extremities:  No digital clubbing, no cyanosis  Neuro:  Alert and oriented, Normal affect, Cranial nerves intact, No sensory deficits      Lab/Data Reviewed:  BMP:   Lab Results   Component Value Date/Time     (H) 05/03/2017 04:00 AM    K 4.1 05/03/2017 04:00 AM     (H) 05/03/2017 04:00 AM    CO2 20 (L) 05/03/2017 04:00 AM    AGAP 11 05/03/2017 04:00 AM    GLU 89 05/03/2017 04:00 AM    BUN 7 05/03/2017 04:00 AM    CREA 1.95 (H) 05/03/2017 04:00 AM    GFRAA 32 (L) 05/03/2017 04:00 AM    GFRNA 26 (L) 05/03/2017 04:00 AM       Assessment/Plan     Principal Problem:    Status epilepticus ([facility] (5/1/2017)    Active Problems:    Acute respiratory failure ([facility] (5/1/2017)      Diabetes ()      Hypertension ()      Dyslipidemia ()        Plan:  Patient has improved after extubation  Follow mental status  Monitor for seizures  Continue anticonvulsant  BS control  BP control

## 2017-05-03 NOTE — PROGRESS NOTES
0710- Bedside shift change report given to [address] (oncoming nurse) by Deisy Wolf (offgoing nurse). Report included the following information SBAR, MAR and Med Rec Status.      5084- Patient extubated at this time

## 2017-05-03 NOTE — PROGRESS NOTES
1915 Bedside and Verbal shift change report given to mirza ROWLEY (oncoming nurse) by Rossy Chan RN   (offgoing nurse). Report included the following information Kardex, MAR and Recent Results.

## 2017-05-03 NOTE — PROGRESS NOTES
Problem: Dysphagia (Adult)  Goal: *Acute Goals and Plan of Care (Insert Text)  Recommendations:  Diet: puree/nectar-thick  Meds: crushed  Aspiration Precautions  Oral Care TID  Other: MBS next day    Goals: Patient will:  1. Tolerate PO trials with 0 s/s overt distress in 4/5 trials  2. Utilize compensatory swallow strategies/maneuvers (decrease bite/sip, size/rate, alt. liq/sol) with min cues in 4/5 trials  3. Complete an objective swallow study (i.e., MBSS) to assess swallow integrity, r/o aspiration, and determine of safest LRD, min A  SPEECH LANGUAGE PATHOLOGY BEDSIDE SWALLOW EVALUATION     Patient: Pedro Miller (68 y.o. female)  Date: 5/3/2017  Primary Diagnosis: Status epilepticus ([facility] [address]  Acute respiratory failure (HCC)        Precautions: aspiration         ASSESSMENT :  Based on the objective data described below, the patient presents with mild-mod OP dysphagia in the setting of AMS. Pt alert but confused; requires strong redirection. Pt exhibited prolonged laughing with impeded effective swallow eval. She was able to take serial swallows of thin liquids with delayed cough. Sips in isolation WNL; however, without cognitive ability to decreased rate/sip of size. Applesauce accepted with prolonged oral bolus prep with swallow delay. Pt safest for puree diet with nectar-thick liquids. Further to benefit from MBS to assess swallow integrity and rule-out aspiration. D/w RNKristan. Patient will benefit from skilled intervention to address the above impairments.   Patients rehabilitation potential is considered to be Fair  Factors which may influence rehabilitation potential include:   [ ]            None noted  [X]            Mental ability/status  [X]            Medical condition  [ ]            Home/family situation and support systems  [X]            Safety awareness  [ ]            Pain tolerance/management  [ ]            Other:        PLAN :  Recommendations and Planned Interventions:  Puree/NTL; MBS next am  Frequency/Duration: Patient will be followed by speech-language pathology 1-2 times per day/4-7 days per week to address goals. Discharge Recommendations: Skilled Nursing Facility       SUBJECTIVE:   Patient stated \"okay. OBJECTIVE:       Past Medical History:   Diagnosis Date    Diabetes      Dyslipidemia      Hypertension      Noncompliance      Polysubstance abuse       ETOH,  marijuana    Psychosis      Pulmonary hypertension       RVSP 60mmHg (ECHO 6/15)    Seizure disorder     No past surgical history on file. Prior Level of Function/Home Situation: lives with family  Home Situation  Home Environment: Apartment  One/Two Story Residence: Two story  Living Alone: No  Support Systems: Family member(s)  Patient Expects to be Discharged to[de-identified] Private residence  Current DME Used/Available at Home: [employer], straight  Diet prior to admission: unable to determine  Current Diet:  Puree/nectar   Cognitive and Communication Status:  Neurologic State: Alert, Confused  Orientation Level: Unable to verbalize  Cognition: Poor safety awareness        Safety/Judgement: Lack of insight into deficits  Oral Assessment:  Oral Assessment  Labial: No impairment  Dentition: Limited  Oral Hygiene: Fair  Lingual: Macroglossia  Velum: No impairment  Mandible: No impairment  P.O. Trials:  Patient Position: HOB 45  Vocal quality prior to P.O.: Breathy  Consistency Presented: Thin liquid;Pudding  How Presented: SLP-fed/presented;Self-fed/presented;Spoon     Bolus Acceptance: No impairment  Bolus Formation/Control: Impaired  Type of Impairment: Delayed;Mastication  Propulsion: Delayed (# of seconds); Discoordination  Oral Residue: Less than 10% of bolus; Lingual  Initiation of Swallow: Delayed (# of seconds)  Laryngeal Elevation: Decreased  Aspiration Signs/Symptoms: Weak cough  Pharyngeal Phase Characteristics: Poor endurance  Effective Modifications: Small sips and bites  Cues for Modifications: Minimal        Oral Phase Severity: Mild  Pharyngeal Phase Severity : Moderate     GCODESwallowing:  Swallow Current Status CK= 40-59%   Swallow Goal Status CI= 1-19%     The severity rating is based on the following outcomes:  LOTTIE Noms Swallow Level 4              Clinical Judgement     PAIN:  Start of Eval: 0  End of Eval: 0      After treatment:   [ ]            Patient left in no apparent distress sitting up in chair  [X]            Patient left in no apparent distress in bed  [X]            Call bell left within reach  [X]            Nursing notified  [ ]            Family present  [ ]            Caregiver present  [ ]            Bed alarm activated      COMMUNICATION/EDUCATION:   [X]            Aspiration precautions; swallow safety; compensatory techniques. [ ]            Patient/family have participated as able in goal setting and plan of care. [ ]            Patient/family agree to work toward stated goals and plan of care. [ ]            Patient understands intent and goals of therapy; neutral about participation. [X]            Patient unable to participate in goal setting/plan of care; educ ongoing with interdisciplinary staff  [ ]             Posted safety precautions in patient's room.      Thank you for this referral.  OLIVIA Ashford  Time Calculation: 25 mins

## 2017-05-03 NOTE — PROGRESS NOTES
1900> Assumed care from Armida Hurt, [facility]. Pt is intubated and sedated. Bed locked and in low position, call bell within reach. 0000> No seizure noted  0400> Remained stable. 0705> Bedside and Verbal shift change report given to Armida Hurt RN (oncoming nurse) by Ellie Salmon RN (offgoing nurse). Report included the following information SBAR, Kardex, Intake/Output, MAR and Recent Results.

## 2017-05-03 NOTE — PROGRESS NOTES
Problem: Ventilator Management  Goal: *Normal spontaneous ventilation  Outcome: Progressing Towards Goal  Vent day 3     Plan: SAT/SBT  Goal: Extubation     HX:

## 2017-05-03 NOTE — ROUTINE PROCESS
Right PICC inserted utilizing 3CG for SVC tip verification. Released for use, RN notified.  Liz Walton

## 2017-05-03 NOTE — PROGRESS NOTES
Neurology Progress Note      Patient: Linh Murray MRN: [id number]  SSN: [id number]    YOB: 1955  Age: 57 y.o. Sex: female      Admit Date: 5/1/2017    LOS: 2 days     Subjective:     Chief Complaint:  Status epilepticus ([address]     Got extubated. No clinical seizures. On Keppra 1500 mg bid. Objective:     Vitals:    05/03/17 1004 05/03/17 1100 05/03/17 1200 05/03/17 1300   BP: 137/81 123/75 132/81 121/74   Pulse: 87 83 81 83   Resp: (!) 31 23 23    Temp: 99.7 °F (37.6 °C) (!) 37.6 °F (3.1 °C) 99.7 °F (37.6 °C) 99.7 °F (37.6 °C)   SpO2: 100% 100%  99%   Weight:       Height:                Physical Exam:   Awake, she greeted me. Knew her name, follows commends. Not oriented to time/date or place. Can count digits. Moves all extremities symmetrically. Review of systems: Denies headaches or chest pain.     Medications Reviewed:  Current Facility-Administered Medications   Medication Dose Route Frequency    levETIRAcetam (KEPPRA) tablet 1,500 mg  1,500 mg Oral BID    bacitracin 500 unit/gram packet 1 Packet  1 Packet Topical PRN    sodium chloride (NS) flush 10-30 mL  10-30 mL InterCATHeter PRN    sodium chloride (NS) flush 10 mL  10 mL InterCATHeter Q24H    sodium chloride (NS) flush 10 mL  10 mL InterCATHeter PRN    sodium chloride (NS) flush 10-40 mL  10-40 mL InterCATHeter Q8H    sodium chloride (NS) flush 20 mL  20 mL InterCATHeter Q24H    dextrose 5% - 0.45% NaCl with KCl 20 mEq/L infusion  125 mL/hr IntraVENous CONTINUOUS    insulin lispro (HUMALOG) injection   SubCUTAneous Q6H    ELECTROLYTE REPLACEMENT PROTOCOL  1 Each Other PRN    ELECTROLYTE REPLACEMENT PROTOCOL  1 Each Other PRN    ELECTROLYTE REPLACEMENT PROTOCOL  1 Each Other PRN    ELECTROLYTE REPLACEMENT PROTOCOL  1 Each Other PRN    acetaminophen (TYLENOL) suppository 325 mg  325 mg Rectal Q4H PRN    enoxaparin (LOVENOX) injection 40 mg  40 mg SubCUTAneous Q24H    pantoprazole (PROTONIX) 40 mg in sodium chloride 0.9 % 10 mL injection  40 mg IntraVENous DAILY    chlorhexidine (PERIDEX) 0.12 % mouthwash 10 mL  10 mL Oral Q12H    glucose chewable tablet 16 g  4 Tab Oral PRN    glucagon (GLUCAGEN) injection 1 mg  1 mg IntraMUSCular PRN    dextrose (D50W) injection syrg 12.5-25 g  25-50 mL IntraVENous PRN     Past Medical History:   Diagnosis Date    Diabetes     Dyslipidemia     Hypertension     Noncompliance     Polysubstance abuse     ETOH,  marijuana    Psychosis     Pulmonary hypertension     RVSP 60mmHg (ECHO 6/15)    Seizure disorder      Social History     Social History    Marital status:      Spouse name: N/A    Number of children: N/A    Years of education: N/A     Occupational History    Not on file. Social History Main Topics    Smoking status: Current Every Day Smoker     Packs/day: 0.25    Smokeless tobacco: Not on file    Alcohol use Yes      Comment: occasional beer    Drug use: Yes     Special: Marijuana, Cocaine      Comment: last used Lorita Lopez 2/25/15    Sexual activity: Not on file     Other Topics Concern    Not on file     Social History Narrative       Assessment/Plan:     Principal Problem:    Status epilepticus ([facility] [address] (5/1/2017)    Active Problems:    Diabetes ()      Hypertension ()      Dyslipidemia ()      Acute respiratory failure ([facility] [address] (5/1/2017)    History of localization related epilepsy. EEG with right temporal focus. Continue Keppra 1500 mg bid. ? Back to her baseline mental status.         Signed By: Vikram Macario MD     May 3, 2017

## 2017-05-03 NOTE — ROUTINE PROCESS
0710- Bedside and Verbal shift change report given to Benjy Marr, RN and Chris Dennison RN (oncoming nurse) by Tammi Gunn RN (offgoing nurse). Report included the following information SBAR, Kardex and MAR.     1635- TRANSFER - OUT REPORT:    Verbal report given to Davidson Wolf RN(name) on Leilani Schafer  being transferred to [facility](unit) for routine progression of care       Report consisted of patients Situation, Background, Assessment and   Recommendations(SBAR). Information from the following report(s) SBAR, ED Summary, [facility] and Cardiac Rhythm NSR was reviewed with the receiving nurse. Lines:   PICC Double Lumen 05/03/17 Right;Brachial (Active)   Central Line Being Utilized Yes 5/3/2017  4:00 PM   Criteria for Appropriate Use Limited/no vessel suitable for conventional peripheral access 5/3/2017  4:00 PM   Site Assessment Clean, dry, & intact 5/3/2017  4:00 PM   Phlebitis Assessment 0 5/3/2017  4:00 PM   Infiltration Assessment 0 5/3/2017  4:00 PM   Arm Circumference (cm) 30 cm 5/3/2017 12:00 PM   Date of Last Dressing Change 05/03/17 5/3/2017  4:00 PM   Dressing Status Clean, dry, & intact 5/3/2017  4:00 PM   Action Taken Open ports on tubing capped 5/3/2017  4:00 PM   External Catheter Length (cm) 1 centimeters 5/3/2017  4:00 PM   Dressing Type Disk with Chlorhexadine gluconate (CHG); Stabilization/securement device;Transparent 5/3/2017 12:00 PM   Hub Color/Line Status Purple 5/3/2017  4:00 PM   Positive Blood Return (Site #1) Yes 5/3/2017  4:00 PM   Hub Color/Line Status Red 5/3/2017  4:00 PM   Positive Blood Return (Site #2) Yes 5/3/2017  4:00 PM   Alcohol Cap Used Yes 5/3/2017 12:00 PM        Opportunity for questions and clarification was provided.       Patient transported with:   Monitor  Registered Nurse

## 2017-05-03 NOTE — PROGRESS NOTES
[facility]  DOS: Tues 5/2/2017    Events: sedated; awaiting clearance from neuro for dc all IV sedation & SBT for possible extubation      IMPRESSION:   # Status epilepticus-in setting of missed dose AED    * MV for airway protection due to above    * Possible aspiration    * propofol & versed infusions until cleared by neurology. * EEG showed some sharp wave activity that could be epileptogenic    * UDS +opioids, benzo, THC    # Resp:     -  Vent bundle. No weaning sedation or vent until cleared by neuro/EEG. Daily CXR and ABG    # CV     * stable. Last ECHO 6/15: EF 45-50% with hypokinesis anteroseptal and inferoseptal walls. RVSP 60. Repeat cardiac enzymes. # Heme/Onc- hgb, plt and INR stable   * lovenox VTEP    # ID - No evidence infection. Trend temps, WBC. High risk aspiration-sputum cx    # Renal     * HAGMA    * ? PRISCILLA     * Trend BUN, Cr, CO2. Baker for I/O. Check LA, salicylate, etoh levels    * protonix    # GI    * Elevated LFT - relationship to seizure meds? Other etiol? * FSG with SSI prn, may need levemir added    * NPO for now; trend LFT    # Others  · Hx HTN  · Medical noncompliance  · Polysubstance abuse  · Pulmonary HTN  · Tobacco use         HPI (5/1/2017)   This patient has been seen and evaluated at the request of Dr. Juancho James for vent management in Bed 2606. Patient is a 57 y.o. female with PMH DM, HTN, medical noncompliance, polysubstance abuse, etoh use, psychosis, pulmonary HTN, tobacco use, seizure disorder who presented to the ED via EMS with seizure at home. Pt was found unresponsive and diaphoretic by EMS and per report,  stated that seizure was approx 1 hour. In the ED she remained unresponsive and was intubated for airway protection. She was loaded with keppra and tele neuro and neurology were consulted. CT head was negative for acute process. CXR following repositioning of ETT showed improved L sided atelectasis.  Labs notable for CO2 9, Gap 26, glucose 279, Cr 1.34, ALT 89, AST 87, trop 0.04. She was started on versed and propofol. Pt was admitted to the ICU for further evaluation and management. Past Medical History:   Diagnosis Date    Diabetes     Dyslipidemia     Hypertension     Noncompliance     Polysubstance abuse     ETOH,  marijuana    Psychosis     Pulmonary hypertension     RVSP 60mmHg (ECHO 6/15)    Seizure disorder       No past surgical history on file. Prior to Admission medications    Medication Sig Start Date End Date Taking? Authorizing Provider   adalimumab (HUMIRA PEN) 40 mg/0.8 mL pnkt 40 mg by SubCUTAneous route. Indications: RHEUMATOID ARTHRITIS    Yaa Robles MD   oxyCODONE-acetaminophen (PERCOCET) 5-325 mg per tablet Take 1 tablet every 4-6 hours as needed for pain control. If you were instructed to try over the counter ibuprofen or tylenol, only take the percocet for pain not controlled with the over the counter medication. 7/23/15   MIGUEL Angel   atorvastatin (LIPITOR) 20 mg tablet Take 1 Tab by mouth nightly. 6/19/15   Mary Crisostomo MD   ondansetron hcl (ZOFRAN, AS HYDROCHLORIDE,) 4 mg tablet Take 1 Tab by mouth every eight (8) hours as needed for Nausea. 6/19/15   Cherie Walker MD   loratadine (CLARITIN) 10 mg tablet Take 10 mg by mouth daily. Historical Provider   Omega-3 Fatty Acids-Fish Oil (FISH OIL) 360-1,200 mg cpDR Take 1,200 mg by mouth. Yaa Robles MD   CALCIUM CARB/VIT D3/MINERALS (CALCIUM-VITAMIN D PO) Take 1,200 mg by mouth. Yaa Robles MD   famotidine (PEPCID) 20 mg tablet Take 20 mg by mouth two (2) times a day. Yaa Robles MD   OLANZapine (ZYPREXA) 5 mg tablet Take 5 mg by mouth nightly. Yaa Robles MD   sertraline (ZOLOFT) 100 mg tablet Take 100 mg by mouth daily. Yaa Robles MD   amLODIPine (NORVASC) 5 mg tablet Take 5 mg by mouth daily. Yaa Robles MD   folic acid (FOLVITE) 1 mg tablet Take 1 mg by mouth daily.     Yaa Robles MD     Current Facility-Administered Medications   Medication Dose Route Frequency    albuterol-ipratropium (DUO-NEB) 2.5 MG-0.5 MG/3 ML  3 mL Nebulization NOW    dextrose 5% - 0.45% NaCl with KCl 20 mEq/L infusion  125 mL/hr IntraVENous CONTINUOUS    insulin lispro (HUMALOG) injection   SubCUTAneous Q6H    levETIRAcetam (KEPPRA) 1500 mg in 100 ml IVPB  1,500 mg IntraVENous Q12H    propofol (DIPRIVAN) infusion  5-50 mcg/kg/min IntraVENous TITRATE    midazolam (VERSED) 100 mg in 0.9% sodium chloride 100 mL infusion  2 mg/hr IntraVENous TITRATE    enoxaparin (LOVENOX) injection 40 mg  40 mg SubCUTAneous Q24H    pantoprazole (PROTONIX) 40 mg in sodium chloride 0.9 % 10 mL injection  40 mg IntraVENous DAILY    chlorhexidine (PERIDEX) 0.12 % mouthwash 10 mL  10 mL Oral Q12H     Allergies   Allergen Reactions    Aspirin Swelling    Penicillins Unknown (comments)      Social History   Substance Use Topics    Smoking status: Current Every Day Smoker     Packs/day: 0.25    Smokeless tobacco: Not on file    Alcohol use Yes      Comment: occasional beer      No family history on file. Review of Systems:  Review of systems not obtained due to patient factors.     Objective:   Vital Signs:    Visit Vitals    /81    Pulse 87    Temp (!) 37.6 °F (3.1 °C)    Resp (!) 31    Ht 5' 4\" (1.626 m)    Wt 79.8 kg (176 lb)    SpO2 100%    BMI 30.21 kg/m2       O2 Device: Ventilator   O2 Flow Rate (L/min): 15 l/min   Temp (24hrs), Av.2 °F (34 °C), Min:37.6 °F (3.1 °C), Max:100.6 °F (38.1 °C)       Intake/Output:   Last shift:      701 - 1900  In: 83.3 [I.V.:83.3]  Out: 700 [Urine:700]  Last 3 shifts: 1901 -  0700  In: 5778.6 [I.V.:5778.6]  Out: 2865 [Urine:2865]    Intake/Output Summary (Last 24 hours) at 17 1120  Last data filed at 17 1100   Gross per 24 hour   Intake          3183.11 ml   Output             2350 ml   Net           833.11 ml       Ventilator Settings:  Mode Rate Tidal Volume Pressure FiO2 PEEP   Assist control, VC+ 400 ml    30 % 7 cm H20     Peak airway pressure: 25 cm H2O    Minute ventilation: 6.91 l/min      ARDS network Guidelines: Lung protective strategy and Pl pressure goals      Physical Exam:  Unchanged from admission to ICU. Appears comfortable synchronous with vent    General:  Intubated and sedated on vent, PERRL   Head:  Normocephalic, without obvious abnormality, atraumatic. Eyes:  Conjunctivae/corneas clear. Nose: Nares normal.    Throat: Lips, mucosa, and tongue normal. ETT in place   Neck: Supple, symmetrical       Lungs:   Clear to auscultation bilaterally, good AE, no wheeze or rhonchi   Chest wall:  No tenderness or deformity. Heart:  Regular, no muurmur   Abdomen:   Soft, non-tender. +obese Bowel sounds normal. No masses. Extremities: Extremities normal, no edema  : meyer in place, clear yellow urine, small clot in tubing   Pulses: 2+ and symmetric all extremities.    Skin: Skin color, texture, turgor normal. Excoriated area L knee               Data:     Recent Results (from the past 24 hour(s))   GLUCOSE, POC    Collection Time: 05/02/17 12:09 PM   Result Value Ref Range    Glucose (POC) 112 (H) 70 - 110 mg/dL   POTASSIUM    Collection Time: 05/02/17 12:42 PM   Result Value Ref Range    Potassium 4.2 3.5 - 5.5 mmol/L   MAGNESIUM    Collection Time: 05/02/17 12:42 PM   Result Value Ref Range    Magnesium 2.3 1.6 - 2.6 mg/dL   PHOSPHORUS    Collection Time: 05/02/17 12:42 PM   Result Value Ref Range    Phosphorus 2.0 (L) 2.5 - 4.9 MG/DL   CALCIUM, IONIZED    Collection Time: 05/02/17  1:13 PM   Result Value Ref Range    Ionized Calcium 1.07 (L) 1.12 - 1.32 MMOL/L   GLUCOSE, POC    Collection Time: 05/02/17  5:34 PM   Result Value Ref Range    Glucose (POC) 110 70 - 110 mg/dL   GLUCOSE, POC    Collection Time: 05/03/17 12:17 AM   Result Value Ref Range    Glucose (POC) 100 70 - 318 mg/dL   METABOLIC PANEL, BASIC    Collection Time: 05/03/17  4:00 AM   Result Value Ref Range    Sodium 147 (H) 136 - 145 mmol/L    Potassium 4.1 3.5 - 5.5 mmol/L    Chloride 116 (H) 100 - 108 mmol/L    CO2 20 (L) 21 - 32 mmol/L    Anion gap 11 3.0 - 18 mmol/L    Glucose 89 74 - 99 mg/dL    BUN 7 7.0 - 18 MG/DL    Creatinine 1.95 (H) 0.6 - 1.3 MG/DL    BUN/Creatinine ratio 4 (L) 12 - 20      GFR est AA 32 (L) >60 ml/min/1.73m2    GFR est non-AA 26 (L) >60 ml/min/1.73m2    Calcium 8.7 8.5 - 10.1 MG/DL   PHOSPHORUS    Collection Time: 17  4:00 AM   Result Value Ref Range    Phosphorus 2.9 2.5 - 4.9 MG/DL   MAGNESIUM    Collection Time: 17  4:00 AM   Result Value Ref Range    Magnesium 2.1 1.6 - 2.6 mg/dL   CALCIUM, IONIZED    Collection Time: 17  4:00 AM   Result Value Ref Range    Ionized Calcium 1.16 1.12 - 1.32 MMOL/L   GLUCOSE, POC    Collection Time: 17  6:04 AM   Result Value Ref Range    Glucose (POC) 104 70 - 110 mg/dL             Telemetry:normal sinus rhythm    Imagin2017 AM: unremarkable OETT no inflitrates    17 CXR    Endotracheal tube now in satisfactory position with considerable improvement of  left lung atelectasis, mild residual linear atelectasis left lung base  laterally.      CT head 17  No acute intracranial abnormalities       Clay Jesus MD

## 2017-05-04 ENCOUNTER — APPOINTMENT (OUTPATIENT)
Dept: GENERAL RADIOLOGY | Age: 62
DRG: 100 | End: 2017-05-04
Attending: PHYSICIAN ASSISTANT
Payer: SELF-PAY

## 2017-05-04 LAB
BACTERIA SPEC CULT: NORMAL
GLUCOSE BLD STRIP.AUTO-MCNC: 100 MG/DL (ref 70–110)
GLUCOSE BLD STRIP.AUTO-MCNC: 102 MG/DL (ref 70–110)
GLUCOSE BLD STRIP.AUTO-MCNC: 105 MG/DL (ref 70–110)
SERVICE CMNT-IMP: NORMAL

## 2017-05-04 PROCEDURE — 65270000029 HC RM PRIVATE

## 2017-05-04 PROCEDURE — 74011250637 HC RX REV CODE- 250/637: Performed by: HOSPITALIST

## 2017-05-04 PROCEDURE — C9113 INJ PANTOPRAZOLE SODIUM, VIA: HCPCS | Performed by: PHYSICIAN ASSISTANT

## 2017-05-04 PROCEDURE — 74011000250 HC RX REV CODE- 250: Performed by: PHYSICIAN ASSISTANT

## 2017-05-04 PROCEDURE — 82962 GLUCOSE BLOOD TEST: CPT

## 2017-05-04 PROCEDURE — 74011250636 HC RX REV CODE- 250/636: Performed by: HOSPITALIST

## 2017-05-04 PROCEDURE — 97162 PT EVAL MOD COMPLEX 30 MIN: CPT

## 2017-05-04 PROCEDURE — 74011250636 HC RX REV CODE- 250/636: Performed by: PHYSICIAN ASSISTANT

## 2017-05-04 PROCEDURE — 92526 ORAL FUNCTION THERAPY: CPT

## 2017-05-04 RX ORDER — LORAZEPAM 2 MG/ML
2 INJECTION INTRAMUSCULAR ONCE
Status: COMPLETED | OUTPATIENT
Start: 2017-05-04 | End: 2017-05-04

## 2017-05-04 RX ORDER — LORAZEPAM 2 MG/ML
1 INJECTION INTRAMUSCULAR
Status: DISCONTINUED | OUTPATIENT
Start: 2017-05-04 | End: 2017-05-08 | Stop reason: HOSPADM

## 2017-05-04 RX ORDER — HALOPERIDOL 5 MG/ML
2 INJECTION INTRAMUSCULAR
Status: DISCONTINUED | OUTPATIENT
Start: 2017-05-04 | End: 2017-05-08 | Stop reason: HOSPADM

## 2017-05-04 RX ADMIN — DEXTROSE MONOHYDRATE, SODIUM CHLORIDE, AND POTASSIUM CHLORIDE 125 ML/HR: 50; 4.5; 1.49 INJECTION, SOLUTION INTRAVENOUS at 17:24

## 2017-05-04 RX ADMIN — DEXTROSE MONOHYDRATE, SODIUM CHLORIDE, AND POTASSIUM CHLORIDE 125 ML/HR: 50; 4.5; 1.49 INJECTION, SOLUTION INTRAVENOUS at 04:52

## 2017-05-04 RX ADMIN — Medication 20 ML: at 13:00

## 2017-05-04 RX ADMIN — Medication 10 ML: at 06:00

## 2017-05-04 RX ADMIN — LEVETIRACETAM 1500 MG: 500 TABLET ORAL at 10:11

## 2017-05-04 RX ADMIN — ENOXAPARIN SODIUM 40 MG: 40 INJECTION SUBCUTANEOUS at 17:26

## 2017-05-04 RX ADMIN — SODIUM CHLORIDE 40 MG: 9 INJECTION INTRAMUSCULAR; INTRAVENOUS; SUBCUTANEOUS at 10:11

## 2017-05-04 RX ADMIN — Medication 10 ML: at 13:00

## 2017-05-04 RX ADMIN — LORAZEPAM 2 MG: 2 INJECTION, SOLUTION INTRAMUSCULAR; INTRAVENOUS at 01:53

## 2017-05-04 RX ADMIN — Medication 10 ML: at 17:32

## 2017-05-04 RX ADMIN — LEVETIRACETAM 1500 MG: 500 TABLET ORAL at 17:27

## 2017-05-04 NOTE — PROGRESS NOTES
Problem: Mobility Impaired (Adult and Pediatric)  Goal: *Acute Goals and Plan of Care (Insert Text)  Physical Therapy Goals  Initiated 5/4/2017 and to be accomplished within 7 day(s)  1. Patient will move from supine to sit and sit to supine , scoot up and down and roll side to side in bed with independence. 2. Patient will transfer from bed to chair and chair to bed with modified independence using the least restrictive device. 3. Patient will perform sit to stand with modified independence. 4. Patient will ambulate with modified independence for 150 feet with the least restrictive device. 5. Patient will ascend/descend 13 stairs with one handrail(s) with supervision/set-up. Outcome: Progressing Towards Goal  PHYSICAL THERAPY EVALUATION     Patient: Antonieta Ruth (82 y.o. female)  Date: 5/4/2017  Primary Diagnosis: Status epilepticus ([facility] [address]  Acute respiratory failure ([facility] [address]        Precautions: Fall      ASSESSMENT :  Based on the objective data described below, the patient presents with decreased strength, decreased mobility, and decreased activity tolerance. Pt rec'd in supine in bed w/  and daughter present. Pt demonstrated good UE strength and ROM but ROM caused pt to have additional stomach pain. Pt (with family assistance) was able to describe living situation and needs at home. PT was about to examine LEs when pt became unresponsive. Vitals were checked (/55, HR 70 bpm). Pt remained unresponsive to sternal rub and repeated calling of name. Pt coughed, opened eyes, locked eyes with therapist, and immediately closed eyes and let head drop down. Pt required max assist x2 to scoot towards head of bed and reposition shoulders and legs to midline in bed. Pt's family was instructed in ankle pumps for pt to perform when she was awake. Pt left in bed, call bell in reach, NAD, bed down, nursing notified. Patient will benefit from skilled intervention to address the above impairments.   Patients rehabilitation potential is considered to be Fair  Factors which may influence rehabilitation potential include:   [ ]         None noted  [ ]         Mental ability/status  [X]         Medical condition  [ ]         Home/family situation and support systems  [ ]         Safety awareness  [X]         Pain tolerance/management  [ ]         Other:        PLAN :  Recommendations and Planned Interventions:  [X]           Bed Mobility Training             [X]    Neuromuscular Re-Education  [X]           Transfer Training                   [ ]    Orthotic/Prosthetic Training  [X]           Gait Training                          [ ]    Modalities  [X]           Therapeutic Exercises          [ ]    Edema Management/Control  [X]           Therapeutic Activities            [X]    Patient and Family   Training/Education  [ ]           Other (comment):     Frequency/Duration: Patient will be followed by physical therapy 3-5 times a week to address goals. Discharge Recommendations: Home Health and [facility] Equipment Recommendations for Discharge: to be determined based on progress       SUBJECTIVE:   Patient stated I don't know where I am. Pt was able to state state, city, and type of facility she was in. OBJECTIVE DATA SUMMARY:       Past Medical History:   Diagnosis Date    Diabetes      Dyslipidemia      Hypertension      Noncompliance      Polysubstance abuse       ETOH,  marijuana    Psychosis      Pulmonary hypertension       RVSP 60mmHg (ECHO 6/15)    Seizure disorder     No past surgical history on file. Barriers to Learning/Limitations: None  Compensate with: visual, verbal, tactile, kinesthetic cues/model     G CODE:Mobility  Current  CM= 80-99%   Goal  CK= 40-59%.   The severity rating is based on the Other Clinical Judgement: pt demonstrated no LE mvmt and pain with UE movement     Eval Complexity: History: HIGH Complexity :3+ comorbidities / personal factors will impact the outcome/ POC Exam:MEDIUM Complexity : 3 Standardized tests and measures addressing body structure, function, activity limitation and / or participation in recreation  Presentation: MEDIUM Complexity : Evolving with changing characteristics  Clinical Decision Making:Medium Complexity Other Clinical Judgement: pt demonstrated no LE mvmt and pain with UE movement Overall Complexity:MEDIUM     Prior Level of Function/Home Situation: Modified Independent in home and community  Home Situation  Home Environment: Apartment  # Steps to Enter: 15  Rails to Enter: Yes  Hand Rails : Left  One/Two Story Residence: Other (Comment) (second floor apartment)  Living Alone: No  Support Systems: Family member(s)  Patient Expects to be Discharged to[de-identified] Unknown  Current DME Used/Available at Home: None  Tub or Shower Type: Tub/Shower combination  Critical Behavior:  Neurologic State: Alert  Orientation Level: Oriented to person  Cognition: Decreased attention/concentration;Decreased command following  Safety/Judgement: Fall prevention  Psychosocial  Patient Behaviors: Calm;Confused; Cooperative; Not interactive  Family  Behaviors: Calm; Cooperative  Needs Expressed: Educational  Purposeful Interaction: Yes  Pt Identified Daily Priority: Clinical issues (comment)  Caritas Process: Nurture loving kindness  Caring Interventions: Reassure  Reassure: Therapeutic listening  Therapeutic Modalities: Intentional therapeutic touch  Skin Condition/Temp: Warm  Family  Behaviors: Calm; Cooperative  Skin Integrity: Abrasion  Skin Integumentary  Skin Color: Appropriate for ethnicity  Skin Condition/Temp: Warm  Skin Integrity: Abrasion  Turgor: Non-tenting  Hair Growth: Absent  Varicosities: Absent  Strength:    Strength: Generally decreased, functional (RUE 5/5, LUE 3+/5)  Tone & Sensation:   Tone: Normal  Sensation: Intact  Range Of Motion:  AROM: Within functional limits (UEs full)  PROM: Within functional limits (UEs)  Pain: 6/10 in stomach pre/post treatment  Activity Tolerance:   Fair  Please refer to the flowsheet for vital signs taken during this treatment. After treatment:   [ ]         Patient left in no apparent distress sitting up in chair  [X]         Patient left in no apparent distress in bed  [X]         Call bell left within reach  [X]         Nursing notified  [X]         Caregiver present  [ ]         Bed alarm activated      COMMUNICATION/EDUCATION:plan of care   [X]         Fall prevention education was provided and the patient/caregiver indicated understanding. [X]         Patient/family have participated as able in goal setting and plan of care. [X]         Patient/family agree to work toward stated goals and plan of care. [ ]         Patient understands intent and goals of therapy, but is neutral about his/her participation. [ ]         Patient is unable to participate in goal setting and plan of care.      Thank you for this referral.  Shelton Dumont   Time Calculation: 20 mins

## 2017-05-04 NOTE — PROGRESS NOTES
3001 [facility] care of patient. Patient in bed sleeping, no signs of distress noted. Sitter at bedside and bed padded for seizure protocol. Bed in low position. Will continue to follow plan of care. 1020 All am medication given. Patient falling asleep while talking. History of epilepsy. Sitter at bedside. Bed in low position    1240 Reassessment done and no changes noted    1740 Family at bedside. No signs of distress noted. 1840 All evening medication given and tolerated well. Patient c/o burning at meyer site. Meyer cath care performed and patient now denies pain at site. Meyer intact and draining melissa urine. Some sediment noted. 1900 Bedside shift change report given to  (oncoming nurse) by Beck Fontenot RN (offgoing nurse). Report included the following information SBAR, Kardex, Intake/Output and MAR.

## 2017-05-04 NOTE — PROGRESS NOTES
Problem: Dysphagia (Adult)  Goal: *Acute Goals and Plan of Care (Insert Text)  Recommendations:  Diet: puree/nectar-thick  Meds: crushed  Aspiration Precautions  Oral Care TID  Other: MBS next day    Goals: Patient will:  1. Tolerate PO trials with 0 s/s overt distress in 4/5 trials  2. Utilize compensatory swallow strategies/maneuvers (decrease bite/sip, size/rate, alt. liq/sol) with min cues in 4/5 trials  3. Complete an objective swallow study (i.e., MBSS) to assess swallow integrity, r/o aspiration, and determine of safest LRD, min A     Outcome: Not Progressing Towards Goal  SPEECH LANGUAGE PATHOLOGY DYSPHAGIA TREATMENT     Patient: Jannie Mesa (57 y.o. female)  Date: 5/4/2017  Diagnosis: Status epilepticus ([facility] [address]  Acute respiratory failure (HCC) Status epilepticus (HCC)       Precautions: aspiration Fall      ASSESSMENT:  Follow up this am with pt's family at b/s; spouse and dtr. Family endorses \"pt does not eat\"; onset ~1.5 months ago. Spouse reports she only has interest in drinking beer; ~2/day. No weight loss reported. At this time, pt drowsy and difficult to alert for po trials. Accordingly, education provided to pt/family re: swallow function and POC; educated family on safe swallow techniques once alert and on plan for MBS. Family verbalized comprehension. Progression toward goals:  [ ]         Improving appropriately and progressing toward goals  [ ]         Improving slowly and progressing toward goals  [X]         Not making progress toward goals and plan of care will be adjusted       PLAN:  Recommendations and Planned Interventions:  MBS next am  Patient continues to benefit from skilled intervention to address the above impairments. Continue treatment per established plan of care. Discharge Recommendations:  Skilled Nursing Facility       SUBJECTIVE:   Patient stated ok.       OBJECTIVE:   Cognitive and Communication Status:  Neurologic State: Drowsy  Orientation Level: Oriented to person  Cognition: Decreased attention/concentration, Decreased command following  Perception: Appears intact  Perseveration: No perseveration noted  Safety/Judgement: Fall prevention  Dysphagia Treatment:  Oral Assessment:  Oral Assessment  Labial: No impairment  Dentition: Limited  Oral Hygiene: Fair  Lingual: Macroglossia  Velum: No impairment  Mandible: No impairment  P.O. Trials:              Patient Position: [facility] [place] [facility]              Vocal quality prior to P.O.: Breathy              Consistency Presented:  Thin liquid, Pudding              How Presented: SLP-fed/presented, Self-fed/presented, Spoon                             Bolus Acceptance: No impairment              Bolus Formation/Control: Impaired              Type of Impairment: Delayed, Mastication              Propulsion: Delayed (# of seconds), Discoordination              Oral Residue: Less than 10% of bolus, Lingual              Initiation of Swallow: Delayed (# of seconds)              Laryngeal Elevation: Decreased              Aspiration Signs/Symptoms: Weak cough              Pharyngeal Phase Characteristics: Poor endurance              Effective Modifications: Small sips and bites              Cues for Modifications: Minimal                                Oral Phase Severity: Moderate              Pharyngeal Phase Severity : Moderate                    PAIN:  Start of Tx: 0  End of Tx: 0      After treatment:   [ ]              Patient left in no apparent distress sitting up in chair  [X]              Patient left in no apparent distress in bed  [X]              Call bell left within reach  [X]              Nursing notified  [ ]              Family present  [ ]              Caregiver present  [ ]              Bed alarm activated         COMMUNICATION/EDUCATION:   [X]        Aspiration precautions; swallow safety; compensatory techniques  [X]        Patient unable to participate in education; education ongoing with staff  [ ]         Posted safety precautions in patient's room.   [ ]         Oral-motor/laryngeal strengthening exercises        Vishnu [place], SLP  Time Calculation: 15 mins

## 2017-05-04 NOTE — PROGRESS NOTES
[facility]  Hospitalist Division        Inpatient Daily Progress Note    Daily progress Note    Patient: Elli Bourgeois MRN: [id number]  CSN: [id number]    YOB: 1955  Age: 57 y.o. Sex: female    DOA: 5/1/2017 LOS:  LOS: 3 days                    Chief Complaint:  Seizures       Subjective:      Awakens to verbal stimuli. Sitter at bedside     Objective:      Visit Vitals    /80 (BP 1 Location: Left arm, BP Patient Position: Supine)    Pulse 70    Temp 98.7 °F (37.1 °C)    Resp 22    Ht 5' 4\" (1.626 m)    Wt 79.8 kg (176 lb)    SpO2 100%    BMI 30.21 kg/m2         Physical Exam:  General appearance: no distress, lethargic    Lungs: clear to auscultation bilaterally  Heart: regular rate and rhythm, S1, S2 normal, no murmur, click, rub or gallop  Abdomen: soft, non distended.  Normoactive bowel sounds  Extremities: extremities normal, atraumatic, no cyanosis or edema  Skin: Skin color, texture, turgor normal. No rashes or lesions  Neurologic: follows commands          Intake and Output:  Current Shift:  05/04 0701 - 05/04 1900  In: 120 [P.O.:120]  Out: 400 [Urine:400]  Last three shifts:  05/02 1901 - 05/04 0700  In: 2755.6 [I.V.:2755.6]  Out: 5810 [Urine:5810]    Recent Results (from the past 24 hour(s))   GLUCOSE, POC    Collection Time: 05/03/17  5:38 PM   Result Value Ref Range    Glucose (POC) 111 (H) 70 - 110 mg/dL   GLUCOSE, POC    Collection Time: 05/03/17  8:40 PM   Result Value Ref Range    Glucose (POC) 112 (H) 70 - 110 mg/dL   GLUCOSE, POC    Collection Time: 05/04/17  6:29 AM   Result Value Ref Range    Glucose (POC) 102 70 - 110 mg/dL   GLUCOSE, POC    Collection Time: 05/04/17 11:41 AM   Result Value Ref Range    Glucose (POC) 100 70 - 110 mg/dL           Lab Results   Component Value Date/Time    Glucose 89 05/03/2017 04:00 AM    Glucose 98 05/02/2017 01:00 AM    Glucose 659 05/01/2017 08:14 PM    Glucose 279 05/01/2017 11:00 AM Glucose 70 06/19/2015 04:32 AM        Assessment/Plan:     Patient Active Problem List   Diagnosis Code    Diabetes E11.9    Hypertension I11.9    Dyslipidemia E78.5    Bradycardia R00.1    Acute respiratory failure (HCC) J96.00    Status epilepticus ([facility] [address] G40.901       A/P:  S/p epilepticus: Neurology following. Continue Keppra   HTN: monitor off antihypertensives   DM: stable.  Continue SSI   GI prophylaxis: Protonix   DVT prophylaxis: Lovenox   Continue sitter for safety       KIM Salgado-BC  [address]  Hospitalist Division  Pager:  [phone]  Office:  [phone]

## 2017-05-04 NOTE — PROGRESS NOTES
completed follow up visit with and Spiritual assessment of patient and offered Pastoral care, see flow sheets for interventions.  Chaplains will continue to follow and will provide pastoral care on an as needed/requested basis    Chaplain Saul Bello   Board Certified [address] [place] [address]   [phone]

## 2017-05-04 NOTE — PROGRESS NOTES
Neurology Progress Note      Patient: Arsen Naidu MRN: [id number]  SSN: [id number]    YOB: 1955  Age: 57 y.o. Sex: female      Admit Date: 5/1/2017    LOS: 3 days     Subjective:     Chief Complaint:  Status epilepticus ([address]     No reported seizures. On Keppra 1500 mg bid. Noted her renal function is worsening . Objective:     Vitals:    05/04/17 0127 05/04/17 0450 05/04/17 0936 05/04/17 1416   BP: 146/86 121/76 140/89 121/80   Pulse: 79 64 70 70   Resp: 21 21 26 22   Temp: 98.8 °F (37.1 °C) 97.8 °F (36.6 °C) 98.3 °F (36.8 °C) 98.7 °F (37.1 °C)   SpO2: 95% 97% 100% 100%   Weight:       Height:                Physical Exam:     Was asleep and when I woked her up and asked how you going she responded\"all right\" then back to sleep    Review of systems: No reliable.     Medications Reviewed:  Current Facility-Administered Medications   Medication Dose Route Frequency    haloperidol lactate (HALDOL) injection 2 mg  2 mg IntraVENous Q4H PRN    LORazepam (ATIVAN) injection 1 mg  1 mg IntraVENous Q4H PRN    levETIRAcetam (KEPPRA) tablet 1,500 mg  1,500 mg Oral BID    bacitracin 500 unit/gram packet 1 Packet  1 Packet Topical PRN    sodium chloride (NS) flush 10-30 mL  10-30 mL InterCATHeter PRN    sodium chloride (NS) flush 10 mL  10 mL InterCATHeter Q24H    sodium chloride (NS) flush 10 mL  10 mL InterCATHeter PRN    sodium chloride (NS) flush 10-40 mL  10-40 mL InterCATHeter Q8H    sodium chloride (NS) flush 20 mL  20 mL InterCATHeter Q24H    dextrose 5% - 0.45% NaCl with KCl 20 mEq/L infusion  125 mL/hr IntraVENous CONTINUOUS    insulin lispro (HUMALOG) injection   SubCUTAneous Q6H    ELECTROLYTE REPLACEMENT PROTOCOL  1 Each Other PRN    ELECTROLYTE REPLACEMENT PROTOCOL  1 Each Other PRN    ELECTROLYTE REPLACEMENT PROTOCOL  1 Each Other PRN    ELECTROLYTE REPLACEMENT PROTOCOL  1 Each Other PRN    acetaminophen (TYLENOL) suppository 325 mg  325 mg Rectal Q4H PRN    enoxaparin (LOVENOX) injection 40 mg  40 mg SubCUTAneous Q24H    pantoprazole (PROTONIX) 40 mg in sodium chloride 0.9 % 10 mL injection  40 mg IntraVENous DAILY    glucose chewable tablet 16 g  4 Tab Oral PRN    glucagon (GLUCAGEN) injection 1 mg  1 mg IntraMUSCular PRN    dextrose (D50W) injection syrg 12.5-25 g  25-50 mL IntraVENous PRN     Past Medical History:   Diagnosis Date    Diabetes     Dyslipidemia     Hypertension     Noncompliance     Polysubstance abuse     ETOH,  marijuana    Psychosis     Pulmonary hypertension     RVSP 60mmHg (ECHO 6/15)    Seizure disorder      Social History     Social History    Marital status:      Spouse name: N/A    Number of children: N/A    Years of education: N/A     Occupational History    Not on file. Social History Main Topics    Smoking status: Current Every Day Smoker     Packs/day: 0.25    Smokeless tobacco: Not on file    Alcohol use Yes      Comment: occasional beer    Drug use: Yes     Special: Marijuana, Cocaine      Comment: last used Aretta Sauers 2/25/15    Sexual activity: Not on file     Other Topics Concern    Not on file     Social History Narrative       Assessment/Plan:     Principal Problem:    Status epilepticus ([facility] [address] (5/1/2017)    Active Problems:    Diabetes ()      Hypertension ()      Dyslipidemia ()      Acute respiratory failure ([facility] [address] (5/1/2017)    History of localization related epilepsy. EEG with right temporal focus. Continue Keppra 1500 mg bid. Her renal function is worsening and so will monitor for improvement and if not then her keppra dose needs to be adjusted.         Signed By: Allison Shaffer MD     May 4, 2017

## 2017-05-05 ENCOUNTER — APPOINTMENT (OUTPATIENT)
Dept: GENERAL RADIOLOGY | Age: 62
DRG: 100 | End: 2017-05-05
Attending: PHYSICIAN ASSISTANT
Payer: SELF-PAY

## 2017-05-05 ENCOUNTER — APPOINTMENT (OUTPATIENT)
Dept: GENERAL RADIOLOGY | Age: 62
DRG: 100 | End: 2017-05-05
Attending: HOSPITALIST
Payer: SELF-PAY

## 2017-05-05 LAB
ANION GAP BLD CALC-SCNC: 7 MMOL/L (ref 3–18)
BACTERIA SPEC CULT: ABNORMAL
BACTERIA SPEC CULT: ABNORMAL
BUN SERPL-MCNC: 6 MG/DL (ref 7–18)
BUN/CREAT SERPL: 6 (ref 12–20)
CALCIUM SERPL-MCNC: 8.1 MG/DL (ref 8.5–10.1)
CHLORIDE SERPL-SCNC: 114 MMOL/L (ref 100–108)
CO2 SERPL-SCNC: 23 MMOL/L (ref 21–32)
CREAT SERPL-MCNC: 1.01 MG/DL (ref 0.6–1.3)
GLUCOSE BLD STRIP.AUTO-MCNC: 105 MG/DL (ref 70–110)
GLUCOSE BLD STRIP.AUTO-MCNC: 119 MG/DL (ref 70–110)
GLUCOSE BLD STRIP.AUTO-MCNC: 129 MG/DL (ref 70–110)
GLUCOSE BLD STRIP.AUTO-MCNC: 90 MG/DL (ref 70–110)
GLUCOSE BLD STRIP.AUTO-MCNC: 97 MG/DL (ref 70–110)
GLUCOSE SERPL-MCNC: 101 MG/DL (ref 74–99)
GRAM STN SPEC: ABNORMAL
POTASSIUM SERPL-SCNC: 3.9 MMOL/L (ref 3.5–5.5)
SERVICE CMNT-IMP: ABNORMAL
SODIUM SERPL-SCNC: 144 MMOL/L (ref 136–145)

## 2017-05-05 PROCEDURE — 74011250636 HC RX REV CODE- 250/636: Performed by: HOSPITALIST

## 2017-05-05 PROCEDURE — 92611 MOTION FLUOROSCOPY/SWALLOW: CPT

## 2017-05-05 PROCEDURE — 80048 BASIC METABOLIC PNL TOTAL CA: CPT | Performed by: NURSE PRACTITIONER

## 2017-05-05 PROCEDURE — 97530 THERAPEUTIC ACTIVITIES: CPT

## 2017-05-05 PROCEDURE — 77010033678 HC OXYGEN DAILY

## 2017-05-05 PROCEDURE — 74011000250 HC RX REV CODE- 250: Performed by: PHYSICIAN ASSISTANT

## 2017-05-05 PROCEDURE — 65270000029 HC RM PRIVATE

## 2017-05-05 PROCEDURE — 74230 X-RAY XM SWLNG FUNCJ C+: CPT

## 2017-05-05 PROCEDURE — 74011250636 HC RX REV CODE- 250/636: Performed by: PHYSICIAN ASSISTANT

## 2017-05-05 PROCEDURE — 74011000255 HC RX REV CODE- 255: Performed by: HOSPITALIST

## 2017-05-05 PROCEDURE — 74011250637 HC RX REV CODE- 250/637: Performed by: HOSPITALIST

## 2017-05-05 PROCEDURE — C9113 INJ PANTOPRAZOLE SODIUM, VIA: HCPCS | Performed by: PHYSICIAN ASSISTANT

## 2017-05-05 PROCEDURE — 82962 GLUCOSE BLOOD TEST: CPT

## 2017-05-05 RX ADMIN — Medication 10 ML: at 22:00

## 2017-05-05 RX ADMIN — Medication 10 ML: at 00:14

## 2017-05-05 RX ADMIN — Medication 10 ML: at 13:00

## 2017-05-05 RX ADMIN — ENOXAPARIN SODIUM 40 MG: 40 INJECTION SUBCUTANEOUS at 17:34

## 2017-05-05 RX ADMIN — DEXTROSE MONOHYDRATE, SODIUM CHLORIDE, AND POTASSIUM CHLORIDE 125 ML/HR: 50; 4.5; 1.49 INJECTION, SOLUTION INTRAVENOUS at 10:37

## 2017-05-05 RX ADMIN — DEXTROSE MONOHYDRATE, SODIUM CHLORIDE, AND POTASSIUM CHLORIDE 125 ML/HR: 50; 4.5; 1.49 INJECTION, SOLUTION INTRAVENOUS at 01:24

## 2017-05-05 RX ADMIN — Medication 10 ML: at 05:49

## 2017-05-05 RX ADMIN — BARIUM SULFATE 20 ML: 400 PASTE ORAL at 09:46

## 2017-05-05 RX ADMIN — BARIUM SULFATE 30 ML: 0.81 POWDER, FOR SUSPENSION ORAL at 09:38

## 2017-05-05 RX ADMIN — Medication 10 ML: at 14:00

## 2017-05-05 RX ADMIN — DEXTROSE MONOHYDRATE, SODIUM CHLORIDE, AND POTASSIUM CHLORIDE 125 ML/HR: 50; 4.5; 1.49 INJECTION, SOLUTION INTRAVENOUS at 19:20

## 2017-05-05 RX ADMIN — Medication 20 ML: at 13:00

## 2017-05-05 RX ADMIN — LEVETIRACETAM 1500 MG: 500 TABLET ORAL at 10:00

## 2017-05-05 RX ADMIN — LEVETIRACETAM 1500 MG: 500 TABLET ORAL at 18:29

## 2017-05-05 RX ADMIN — SODIUM CHLORIDE 40 MG: 9 INJECTION INTRAMUSCULAR; INTRAVENOUS; SUBCUTANEOUS at 10:00

## 2017-05-05 NOTE — PROGRESS NOTES
Problem: Mobility Impaired (Adult and Pediatric)  Goal: *Acute Goals and Plan of Care (Insert Text)  Physical Therapy Goals  Initiated 5/4/2017 and to be accomplished within 7 day(s)  1. Patient will move from supine to sit and sit to supine , scoot up and down and roll side to side in bed with independence. 2. Patient will transfer from bed to chair and chair to bed with modified independence using the least restrictive device. 3. Patient will perform sit to stand with modified independence. 4. Patient will ambulate with modified independence for 150 feet with the least restrictive device. 5. Patient will ascend/descend 13 stairs with one handrail(s) with supervision/set-up. Outcome: Progressing Towards Goal  PHYSICAL THERAPY TREATMENT     Patient: Gisela Lo (45 y.o. female)  Date: 5/5/2017  Diagnosis: Status epilepticus ([address]  Acute respiratory failure ([facility] [address] Status epilepticus ([address]       Precautions: Fall  Chart, physical therapy assessment, plan of care and goals were reviewed. ASSESSMENT:  Pt presents in bed with sitter present. Agreed to participate with PT. Comes to EOB with CG/VC; reports dizziness with sitting, provided cueing and encouragement to continue to sit upright and pt /82 sitting. Toelrated 14 mins static sit EOB. Participated in light ex at EOB w max VC/tactile. Sit to stand x 2 trials and max cues to take a few steps to [facility] before becoming slightly agitated and handling lines. Returned to bed without incident. . EDUCATION: PT POC. Progression toward goals:        Improving slowly and progressing toward goals       PLAN:  Patient continues to benefit from skilled intervention to address the above impairments. Continue treatment per established plan of care. Discharge Recommendations: snf v home w   Further Equipment Recommendations for Discharge: reports she has a walker       SUBJECTIVE:   Patient stated i'm dizzy. \" (upon sitting EOB).       OBJECTIVE DATA SUMMARY:   Critical Behavior:  Neurologic State: Alert  Orientation Level: Oriented to person  Cognition: Decreased command following  Safety/Judgement: Fall prevention     G CODE:na  Functional Mobility Training:  Bed Mobility:  Rolling: Modified independent;Supervision (rolls spontaneously)  Supine to Sit: Contact guard assistance  Sit to Supine: Stand-by asssistance              Interventions: Safety awareness training; Tactile cues; Verbal cues  Transfers:  Sit to Stand: Minimum assistance; Moderate assistance  Stand to Sit: Minimum assistance  Interventions: Safety awareness training; Tactile cues; Verbal cues  Balance:  Sitting: Intact  Standing: Impaired; With support  Standing - Static: Fair  Standing - Dynamic : Poor  Ambulation/Gait Trainin sidesteps at EO B only at this time with MIN A , VC, and Rw  Therapeutic Exercises:   B LAQ x 10 w max verbal & tactile  cueing  Vital Signs  Temp: 98.5 °F (36.9 °C)     Pulse (Heart Rate): 66     BP: 124/82   ; pre activity and 123/79 after stand activity  Resp Rate: 20     O2 Sat (%): 100 %  Pain:  Pre treatment pain level:  0  Post treatment pain level:  0  Pain Scale 1: Numeric (0 - 10)  Pain Intensity 1: 0  Activity Tolerance:   Poor; limited to sit EOB 14mins before becoming restless and returning to bed.   After treatment:   Patient left in no apparent distress in bed left sidelying  Call bell left within reach  Nursing notified  Sitter present      Jessica Rogers PTA   Time Calculation: 26 mins

## 2017-05-05 NOTE — ROUTINE PROCESS
232 Bedside and Verbal shift change  Received from JONATHAN Hurley, RN, RN (outgoing nurse), to JOCELYN Thomas (oncoming)  Pt. Is AOX self. IV Patent and infusing well, Pt. denies  pain at this time. Report included the following information SBAR, Kardex, Procedure Summary, Intake/Output, MAR, Recent Lab Results, and  Cardiac Rhythm @ NSR. Will resume care and monitor Pt. Condition. Pt. Educated on call bell when in need of help and assistance. Pt. Unable to comprehend education    2300 Pt. Head to toe Assessment Done and documented. 0130  Pt. Able to answer birthday only. 0330  No seizure episode. 0430  Given complete bath per CNA/Sitter and RN. Pt. Able to turn with assist.  Hallie care given, changed gown, linen pad and repositioned. 4834  Pt. Able to rest well throughout the night.

## 2017-05-05 NOTE — ROUTINE PROCESS
Bedside shift change report given to AMRIK Soria (oncoming nurse) by Kirkland Dance (offgoing nurse). Report included the following information SBAR.

## 2017-05-05 NOTE — MANAGEMENT PLAN
Patient has no insurance will need to discharge home when medically ready to discharge  Alpesh Freeman RN BSN  Outcomes Manager  Pager # [phone]

## 2017-05-05 NOTE — PROGRESS NOTES
[facility]  Hospitalist Division        Inpatient Daily Progress Note    Daily progress Note    Patient: Kimberley Wheatley MRN: [id number]  CSN: [id number]    YOB: 1955  Age: 57 y.o. Sex: female    DOA: 5/1/2017 LOS:  LOS: 4 days                    Chief Complaint:  Seizures       Subjective:      Sitting up in bed watching TV. Daughter at bedside. In NAD. Objective:      Visit Vitals    /90 (BP 1 Location: Left arm, BP Patient Position: At rest)    Pulse 72    Temp 98.7 °F (37.1 °C)    Resp 18    Ht 5' 4\" (1.626 m)    Wt 79.8 kg (176 lb)    SpO2 100%    BMI 30.21 kg/m2         Physical Exam:  General appearance: awake, alert, in no distress    Lungs: clear to auscultation throughout   Heart: regular rate and rhythm, S1, S2 normal, no murmur, click, rub or gallop  Abdomen: soft, non distended. Bowel sounds normoactive   Extremities: extremities normal, atraumatic, no cyanosis. Trace BLE edema   Skin: Skin color, texture, turgor normal. No rashes or lesions  Neurologic: moves all 4 extremities equally           Intake and Output:  Current Shift:     Last three shifts:  05/03 1901 - 05/05 0700  In: 1828.3 [P.O.:120;  I.V.:1708.3]  Out: 3450 [Urine:3450]    Recent Results (from the past 24 hour(s))   GLUCOSE, POC    Collection Time: 05/04/17  5:31 PM   Result Value Ref Range    Glucose (POC) 105 70 - 110 mg/dL   GLUCOSE, POC    Collection Time: 05/05/17 12:12 AM   Result Value Ref Range    Glucose (POC) 119 (H) 70 - 110 mg/dL   GLUCOSE, POC    Collection Time: 05/05/17  5:48 AM   Result Value Ref Range    Glucose (POC) 90 70 - 110 mg/dL   GLUCOSE, POC    Collection Time: 05/05/17 11:49 AM   Result Value Ref Range    Glucose (POC) 129 (H) 70 - 110 mg/dL           Lab Results   Component Value Date/Time    Glucose 89 05/03/2017 04:00 AM    Glucose 98 05/02/2017 01:00 AM    Glucose 659 05/01/2017 08:14 PM    Glucose 279 05/01/2017 11:00 AM    Glucose 70 06/19/2015 04:32 AM        Assessment/Plan:     Patient Active Problem List   Diagnosis Code    Diabetes E11.9    Hypertension I11.9    Dyslipidemia E78.5    Bradycardia R00.1    Acute respiratory failure (HCC) J96.00    Status epilepticus ([facility] [address] G40.901       A/P:  S/p epilepticus: Neurology following. Continue Keppra  Stat BMP- monitor renal function   HTN: continue to monitor off antihypertensives   DM: stable.  Continue SSI   GI prophylaxis: Protonix   DVT prophylaxis: Lovenox   Continue sitter for safety       Cindy Avelar, FNP-BC  [address] [facility]  Hospitalist Division  Pager:  [phone]  Office:  [phone]

## 2017-05-05 NOTE — PROGRESS NOTES
1910 Bedside and Verbal shift change report given to Nguyen Wood (oncoming nurse) by Price Alcantar RN (offgoing nurse). Report included the following information SBAR, Kardex, MAR and Recent Results. 2000 shift assessment done,patient complaining of meyer wants it out,she is in pain    2110 patient trying to pull out her meyer ,insists she wants to be able to go to the bathroom without a tube    2120 md paged,meyer to be discontinued    2147 meyer catheter discontinued,800mls of urine drained ,patient was complaining of pain and burning ,patient made comfortable in bed with no signs of distress noted    2325 Bedside and Verbal shift change report given to 60Anais Issa (oncoming nurse) by Nguyen Wood RN (offgoing nurse). Report included the following information SBAR, Kardex, MAR and Recent Results.

## 2017-05-05 NOTE — ROUTINE PROCESS
Verbal and bedside Shift changed report given to Bonifacio Wetzel RN (oncoming RN) on Pt. Condition. Report consisted of patients Situation, History, Activities, intake/output,  Background, Assessment and Recommendations(SBAR). Information from the following report(s) Kardex, order Summary, Lab results and MAR was reviewed with the receiving nurse. Opportunity for questions and clarification was provided.

## 2017-05-05 NOTE — PROGRESS NOTES
1910 Bedside and Verbal shift change report given to NELSON Chi RN (oncoming nurse) by Mattie Alvarado and MELITON Medina RN (offgoing nurse). Report included the following information SBAR, Kardex, Intake/Output, MAR and Recent Results. Patient in bed awake with sitter present. Bed in low position, call bell within reach, and patient offered no complains. Will continue to monitor. 2006 Shift assessment completed. Patient denies having any pain. Will continue to monitor. 2054 Pt's  called stating he is very worried about pt not receiving her home medication Humira pen she takes every 14 days for rheumatoid arthritis. Patient's  left his phone number [phone].     [id number] Spoke with Dr. Lucho Grace concerning pt's 's concern for making sure pt receives her Humira pen for rheumatoid arthritis. Orders received to schedule Humira pen. Unable to place order for Humira pen. Pharmacy called to verify if hospital carries Humira pen. Pharmacy stated hospital does not carry Humira pen, and pt will have to bring their own. Instruction received from pharmacy to place order as \"other (non-formulary). \" Will notify pt's  to bring medication. 2346 Pt's blood sugar is 105, insulin (Humalog) held at this time. Will continue to monitor. 0030 Reassessment completed. Patient in bed sleeping with sitter present. No signs of distress noted. Will continue to monitor. 9766 Reassessment completed. Patient in bed sleeping with sitter present. Changes noted. Will continue to monitor. 1527 Pt's blood sugar is 111, held insulin (Humalog) at this time. Will continue to monitor. 5809 Followed up with pt's  concerning bringing in Humira pen. Pt's  stated he will bring it in but not sure what time. Will let next primary RN know. 0740 Bedside and Verbal shift change report given to TORY Evans RN (oncoming nurse) by Blas Piedra RN (offgoing nurse).  Report included the following information SBAR, Kardex, Intake/Output, MAR and Recent Results. Made AMRIK Evans aware of pt's  bringing in pt's Humira pen.

## 2017-05-05 NOTE — PROGRESS NOTES
Problem: Dysphagia (Adult)  Goal: *Acute Goals and Plan of Care (Insert Text)  Recommendations:  Diet: puree/thin  Meds: crushed  Aspiration Precautions  Oral Care TID    Goals: Patient will:  1. Tolerate PO trials with 0 s/s overt distress in 4/5 trials  2. Utilize compensatory swallow strategies/maneuvers (decrease bite/sip, size/rate, alt. liq/sol) with min cues in 4/5 trials  3. Complete an objective swallow study (i.e., MBSS) to assess swallow integrity, r/o aspiration, and determine of safest LRD, min A - goal met 5/5/17   [address] STUDY     Patient: Leilani Schafer (44 y.o. female)  Date: 5/5/2017  Primary Diagnosis: Status epilepticus ([facility] [address]  Acute respiratory failure (HCC)        Precautions: aspiration  Fall      ASSESSMENT :  MBS completed without aspiration on thin liquids + straw and pudding trials. Pt with labored oral bolus prep with pudding; behavioral in nature secondary to dislike of taste. Attempted cracker trial; pt refused by expelling cracker. At this time, pt's cognitive function impeding effective mastication/deglutition of advanced solid trials. Otherwise, oral-motor structure and function intact; no aspiration evident. Pt presents with minimal oropharyngeal dysphagia, as evidenced above, which places pt at risk for aspiration. At this time, safest for puree solid, thin liquid diet. SLP utilized video of study for visual feedback, education, and recommendations for pt/caregiver; verbalized comprehension. Patient will benefit from skilled intervention to address the above impairments.   Patients rehabilitation potential is considered to be Fair  Factors which may influence rehabilitation potential include:   [ ]              None noted  [X]              Mental ability/status  [X]              Medical condition  [ ]              Home/family situation and support systems  [X]              Safety awareness  [ ]              Pain tolerance/management  [ ] Other: PLAN :  Recommendations and Planned Interventions:  puree  Frequency/Duration: Patient will be followed by speech-language pathology 1-2 visits to address goals. Discharge Recommendations: Skilled Nursing Facility       SUBJECTIVE:   Patient stated Lakhwinder Burdick don't want it. OBJECTIVE:       Past Medical History:   Diagnosis Date    Diabetes      Dyslipidemia      Hypertension      Noncompliance      Polysubstance abuse       ETOH,  marijuana    Psychosis      Pulmonary hypertension       RVSP 60mmHg (ECHO 6/15)    Seizure disorder     No past surgical history on file. Prior Level of Function/Home Situation: lives with family  Home Situation  Home Environment: Apartment  # Steps to Enter: 15  Rails to Enter: Yes  Hand Rails : Left  One/Two Story Residence: Other (Comment) (second floor apartment)  Living Alone: No  Support Systems: Family member(s)  Patient Expects to be Discharged to[de-identified] Unknown  Current DME Used/Available at Home: None  Tub or Shower Type: Tub/Shower combination  Diet prior to admission: regular  Current Diet:  Puree/thin   Radiologist: [employer] Views: Lateral  Patient Position: 90      Trial 1: Trial 2:   Consistency Presented:  Thin liquid Consistency Presented: Pudding   How Presented: SLP-fed/presented;Straw;Successive swallows How Presented: SLP-fed/presented;Spoon         Bolus Acceptance: No impairment Bolus Acceptance: No impairment   Bolus Formation/Control: No impairment:   Bolus Formation/Control: Impaired: Delayed   Propulsion: No impairment Propulsion: No impairment   Oral Residue: None Oral Residue: None   Initiation of Swallow: No impairment Initiation of Swallow: Triggered at valleculae   Timing: No impairment Timing: No impairment   Penetration: None Penetration: None   Aspiration/Timing: No evidence of aspiration Aspiration/Timing: No evidence of aspiration   Pharyngeal Clearance: No residue Pharyngeal Clearance: No residue               Cues for Modifications: None Cues for Modifications: None             Trial 3: Trial 4:                            :    :                                                                          Decreased Tongue Base Retraction?: No  Laryngeal Elevation: WFL (within functional limits)  Aspiration/Penetration Score: 1 (No penetration or aspiration-Contrast does not enter the airway)  Pharyngeal Symmetry: Not assessed  Pharyngeal-Esophageal Segment: No impairment  Pharyngeal Dysfunction: None     Oral Phase Severity: Mild  Pharyngeal Phase Severity: N/A     PAIN:  Start of Study: 0  End of Study: 0       COMMUNICATION/EDUCATION:   [X]  Aspiration risks/precautions; compensatory swallow techniques  [ ]  Patient/family have participated as able in goal setting and plan of care. [ ]  Patient/family agree to work toward stated goals and plan of care. [ ]  Patient understands intent and goals of therapy, but is neutral about his/her participation. [X]  Patient is unable to participate in goal setting and plan of care; ongoing with interdisciplinary POC.      Thank you for this referral.  Omar Garcia, SLP  Time Calculation: 25 mins

## 2017-05-05 NOTE — PROGRESS NOTES
NUTRITION follow-up/Plan of care    RECOMMENDATIONS:     1. Pureed diet; NTL  2. SF CIB BID  3. Monitor weight, labs and PO intake  4. RD to follow    GOALS:     1. Ongoing: PO intake meets >75% of protein/calorie needs by 5/10  2. Ongoing: Maintain weight (+/- 1-2 lb) by 5/10    ASSESSMENT:     Weight status is classified as obese per BMI of 30.2. PO intake is poor. Added SF CIB TID to help meet nutrition needs. Labs noted. Patient with hypernatremia. Nutrition recommendations listed. RD to follow. Nutrition Risk:  []   High [x]  Moderate [] Low    SUBJECTIVE/OBJECTIVE:     Transferred from ICU. Patient appears well nourished. S/P MBS. Reviewed SLP note. Patient with mild-moderate OP dysphagia in the setting of AMS with recommendations for pureed diet and NTL. Added CIB supplements to help meet nutrition needs. Will monitor. Information Obtained From:   [x] Chart Review  [x] Patient  [] Family/Caregiver  [] Nurse/Physician   [] Patient Rounds/Interdisciplinary Meeting    Diet: Pureed diet; NTL  Patient Vitals for the past 100 hrs:   % Diet Eaten   05/04/17 1417 0 %   05/04/17 0936 0 %     Medications: [x] Reviewed   Encounter Diagnoses     ICD-10-CM ICD-9-CM   1. 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  Metabolic acidosis J62.9 879.4     Past Medical History:   Diagnosis Date    Diabetes     Dyslipidemia     Hypertension     Noncompliance     Polysubstance abuse     ETOH,  marijuana    Psychosis     Pulmonary hypertension     RVSP 60mmHg (ECHO 6/15)    Seizure disorder      Labs:  Lab Results   Component Value Date/Time    Sodium 147 05/03/2017 04:00 AM    Potassium 4.1 05/03/2017 04:00 AM    Chloride 116 05/03/2017 04:00 AM    CO2 20 05/03/2017 04:00 AM    Anion gap 11 05/03/2017 04:00 AM    Glucose 89 05/03/2017 04:00 AM    BUN 7 05/03/2017 04:00 AM    Creatinine 1.95 05/03/2017 04:00 AM    Calcium 8.7 05/03/2017 04:00 AM Magnesium 2.1 05/03/2017 04:00 AM    Phosphorus 2.9 05/03/2017 04:00 AM    Albumin 2.9 05/02/2017 01:00 AM     Anthropometrics: BMI (calculated): 30.2 Last 3 Recorded Weights in this Encounter    05/01/17 1101 05/05/17 0547   Weight: 79.8 kg (176 lb) 79.8 kg (176 lb)    Ht Readings from Last 1 Encounters:   05/01/17 5' 4\" (1.626 m)     []  Weight Loss  []  Weight Gain  [x]  Weight Stable   []  New wt n/a on record     Estimated Nutrition Needs:   1612 Kcals/day  Protein (g): 65 g    Nutrition Problems Identified:   [x] Suboptimal PO intake   [] Food Allergies  [x] Difficulty chewing/swallowing/poor dentition  [] Constipation/Diarrhea   [] Nausea/Vomiting   [] None  [] Other:     Plan:   [x] Therapeutic Diet  []  Obtained/adjusted food preferences/tolerances and/or snacks options   [x]  Supplements added   [x] Occupational therapy following for feeding techniques  []  HS snack added   [x]  Modify diet texture   []  Modify diet for food allergies   []  Assist with menu selection   [x]  Monitor PO intake on meal rounds   [x]  Continue inpatient monitoring and intervention   []  Participated in discharge planning/Interdisciplinary rounds/Team meetings   []  Other:     Education Needs:   [x] Not appropriate for teaching at this time    [] Identified and addressed    Nutrition Monitoring and Evaluation:   [x] Continue inpatient monitoring and interventions    [] Other:     Jenny Tanner

## 2017-05-05 NOTE — CDMP QUERY
Please clarify if this patient is being treated/managed for:    =>Metabolic encephalopathy related to  epileptic seizure activity    =>Other Explanation of clinical findings  =>Unable to Determine (no explanation of clinical findings)    The medical record reflects the following:    Risk: 58 yo female with PMH seizure disorder, HTN, polysubstance abuse    Clinical Indicators: Per ED note \"The EMS explains the patient was unresponsive and diaphoretic upon arival. Per  the pt has been having the seizure episode for the last hour. [place] approximately 1.5 hours  Per NN 5/4 2308 patient agitated trying to get out of bed ,sitter placed in patients room MD paged Iv 1 mg ativan ordered once   0153 patient still agitated and had a seizure for about 45 secs 2 mg ativan every 5 minutes upto 3 doses,2mg haldol also orderd IV to be given slowly over 2-3 mins    Treatment: Ativan, haldol, sitter @ bs,     Please clarify and document your clinical opinion in the progress notes and discharge summary including the definitive and/or presumptive diagnosis, (suspected or probable), related to the above clinical findings. Please include clinical findings supporting your diagnosis. If you DECLINE this query or would like to communicate with [facility], please utilize the \"[facility] message box\" at the TOP of the Progress Note on the right.       Thank you,  Aida Lockett RN [facility]  [phone]

## 2017-05-06 ENCOUNTER — APPOINTMENT (OUTPATIENT)
Dept: GENERAL RADIOLOGY | Age: 62
DRG: 100 | End: 2017-05-06
Attending: PHYSICIAN ASSISTANT
Payer: SELF-PAY

## 2017-05-06 LAB
ANION GAP BLD CALC-SCNC: 9 MMOL/L (ref 3–18)
BUN SERPL-MCNC: 5 MG/DL (ref 7–18)
BUN/CREAT SERPL: 5 (ref 12–20)
CALCIUM SERPL-MCNC: 8.4 MG/DL (ref 8.5–10.1)
CHLORIDE SERPL-SCNC: 114 MMOL/L (ref 100–108)
CO2 SERPL-SCNC: 22 MMOL/L (ref 21–32)
CREAT SERPL-MCNC: 0.93 MG/DL (ref 0.6–1.3)
GLUCOSE BLD STRIP.AUTO-MCNC: 102 MG/DL (ref 70–110)
GLUCOSE BLD STRIP.AUTO-MCNC: 111 MG/DL (ref 70–110)
GLUCOSE BLD STRIP.AUTO-MCNC: 88 MG/DL (ref 70–110)
GLUCOSE BLD STRIP.AUTO-MCNC: 97 MG/DL (ref 70–110)
GLUCOSE SERPL-MCNC: 76 MG/DL (ref 74–99)
POTASSIUM SERPL-SCNC: 4.4 MMOL/L (ref 3.5–5.5)
SODIUM SERPL-SCNC: 145 MMOL/L (ref 136–145)

## 2017-05-06 PROCEDURE — 74011000250 HC RX REV CODE- 250: Performed by: PHYSICIAN ASSISTANT

## 2017-05-06 PROCEDURE — 80048 BASIC METABOLIC PNL TOTAL CA: CPT | Performed by: NURSE PRACTITIONER

## 2017-05-06 PROCEDURE — 74011250636 HC RX REV CODE- 250/636: Performed by: PHYSICIAN ASSISTANT

## 2017-05-06 PROCEDURE — 36415 COLL VENOUS BLD VENIPUNCTURE: CPT | Performed by: NURSE PRACTITIONER

## 2017-05-06 PROCEDURE — 02HV33Z INSERTION OF INFUSION DEVICE INTO SUPERIOR VENA CAVA, PERCUTANEOUS APPROACH: ICD-10-PCS | Performed by: HOSPITALIST

## 2017-05-06 PROCEDURE — 82962 GLUCOSE BLOOD TEST: CPT

## 2017-05-06 PROCEDURE — 71010 XR CHEST PORT: CPT

## 2017-05-06 PROCEDURE — 74011250637 HC RX REV CODE- 250/637: Performed by: HOSPITALIST

## 2017-05-06 PROCEDURE — 65270000029 HC RM PRIVATE

## 2017-05-06 PROCEDURE — 74011250636 HC RX REV CODE- 250/636: Performed by: HOSPITALIST

## 2017-05-06 PROCEDURE — C9113 INJ PANTOPRAZOLE SODIUM, VIA: HCPCS | Performed by: PHYSICIAN ASSISTANT

## 2017-05-06 RX ADMIN — Medication 10 ML: at 21:19

## 2017-05-06 RX ADMIN — SODIUM CHLORIDE 40 MG: 9 INJECTION INTRAMUSCULAR; INTRAVENOUS; SUBCUTANEOUS at 08:29

## 2017-05-06 RX ADMIN — DEXTROSE MONOHYDRATE, SODIUM CHLORIDE, AND POTASSIUM CHLORIDE 125 ML/HR: 50; 4.5; 1.49 INJECTION, SOLUTION INTRAVENOUS at 12:29

## 2017-05-06 RX ADMIN — LEVETIRACETAM 1500 MG: 500 TABLET ORAL at 08:29

## 2017-05-06 RX ADMIN — Medication 10 ML: at 06:35

## 2017-05-06 RX ADMIN — LEVETIRACETAM 1500 MG: 500 TABLET ORAL at 18:00

## 2017-05-06 RX ADMIN — Medication 10 ML: at 12:29

## 2017-05-06 RX ADMIN — Medication 10 ML: at 15:06

## 2017-05-06 RX ADMIN — Medication 20 ML: at 12:29

## 2017-05-06 RX ADMIN — DEXTROSE MONOHYDRATE, SODIUM CHLORIDE, AND POTASSIUM CHLORIDE 125 ML/HR: 50; 4.5; 1.49 INJECTION, SOLUTION INTRAVENOUS at 04:52

## 2017-05-06 RX ADMIN — DEXTROSE MONOHYDRATE, SODIUM CHLORIDE, AND POTASSIUM CHLORIDE 125 ML/HR: 50; 4.5; 1.49 INJECTION, SOLUTION INTRAVENOUS at 21:15

## 2017-05-06 NOTE — PROGRESS NOTES
0740 -- Bedside and Verbal shift change report given to [address] (oncoming nurse) by Juan C Christie RN (offgoing nurse). Sitter Jocelynn at bedside. Report included the following information SBAR, Procedure Summary, Intake/Output, MAR, Recent Results. Bed locked and lowered, siderails up x3. Whiteboard updated with information. Pt's  is bring in medication Humira.     0829 -- Morning medications administered, pt tolerated with ease.       1215 -- Shift reassessment, pt condition unchanged will continue to monitor. Still awaiting spouse to deliver Humira. 65 --  has arrived with Humira, states pt's last dose was April 27, 2017. Medication taken to Pharm for labeling. 1504 -- Pharm has approved medication, will be administered. 1847 -- Shift reassessment, pt condition unchanged will continue to monitor. 1900 -- Bedside and Verbal shift change report given to [address] (oncoming nurse) by Sujatha Mock RN (offgoing nurse) Report included the following information SBAR, Procedure Summary, Intake/Output, MAR, Recent Results. Pt up in bed. Bed locked and lowered, siderails up x3. Call bell at bedside.
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
## 2017-05-06 NOTE — PROGRESS NOTES
Neurology Progress Note      Patient: Pedro Miller MRN: [id number]  SSN: [id number]    YOB: 1955  Age: 57 y.o. Sex: female      Admit Date: 5/1/2017    LOS: 5 days     Subjective:     Chief Complaint:  Status epilepticus ([address]     No reported seizures. On Keppra 1500 mg bid. Renal function normalized. Objective:     Vitals:    05/05/17 2134 05/06/17 0157 05/06/17 0625 05/06/17 1002   BP: 124/63 (!) 146/96 128/67 119/79   Pulse: 88 73 75 67   Resp: 16 20 20 20   Temp: 99.1 °F (37.3 °C) 99.3 °F (37.4 °C) 99.3 °F (37.4 °C) 98.4 °F (36.9 °C)   SpO2: 99% 100% 100% 100%   Weight:       Height:                Physical Exam:     Awake, knew she is in the hospital in [place]. Follows commends. Review of systems: No reliable.     Medications Reviewed:  Current Facility-Administered Medications   Medication Dose Route Frequency    adalimumab (HUMRIA) 40 mg/0.8 mL injection 1 Pen - PATIENT'S OWN MEDICATION  40 mg SubCUTAneous Q 14 DAYS    haloperidol lactate (HALDOL) injection 2 mg  2 mg IntraVENous Q4H PRN    LORazepam (ATIVAN) injection 1 mg  1 mg IntraVENous Q4H PRN    levETIRAcetam (KEPPRA) tablet 1,500 mg  1,500 mg Oral BID    bacitracin 500 unit/gram packet 1 Packet  1 Packet Topical PRN    sodium chloride (NS) flush 10-30 mL  10-30 mL InterCATHeter PRN    sodium chloride (NS) flush 10 mL  10 mL InterCATHeter Q24H    sodium chloride (NS) flush 10 mL  10 mL InterCATHeter PRN    sodium chloride (NS) flush 10-40 mL  10-40 mL InterCATHeter Q8H    sodium chloride (NS) flush 20 mL  20 mL InterCATHeter Q24H    dextrose 5% - 0.45% NaCl with KCl 20 mEq/L infusion  125 mL/hr IntraVENous CONTINUOUS    insulin lispro (HUMALOG) injection   SubCUTAneous Q6H    acetaminophen (TYLENOL) suppository 325 mg  325 mg Rectal Q4H PRN    enoxaparin (LOVENOX) injection 40 mg  40 mg SubCUTAneous Q24H    pantoprazole (PROTONIX) 40 mg in sodium chloride 0.9 % 10 mL injection  40 mg IntraVENous DAILY    glucose chewable tablet 16 g  4 Tab Oral PRN    glucagon (GLUCAGEN) injection 1 mg  1 mg IntraMUSCular PRN    dextrose (D50W) injection syrg 12.5-25 g  25-50 mL IntraVENous PRN     Past Medical History:   Diagnosis Date    Diabetes     Dyslipidemia     Hypertension     Noncompliance     Polysubstance abuse     ETOH,  marijuana    Psychosis     Pulmonary hypertension     RVSP 60mmHg (ECHO 6/15)    Seizure disorder      Social History     Social History    Marital status:      Spouse name: N/A    Number of children: N/A    Years of education: N/A     Occupational History    Not on file. Social History Main Topics    Smoking status: Current Every Day Smoker     Packs/day: 0.25    Smokeless tobacco: Not on file    Alcohol use Yes      Comment: occasional beer    Drug use: Yes     Special: Marijuana, Cocaine      Comment: last used [address] 2/25/15    Sexual activity: Not on file     Other Topics Concern    Not on file     Social History Narrative       Assessment/Plan:     Principal Problem:    Status epilepticus ([facility] [address] (5/1/2017)    Active Problems:    Diabetes ()      Hypertension ()      Dyslipidemia ()      Acute respiratory failure ([facility] [address] (5/1/2017)    History of localization related epilepsy. EEG with right temporal focus. Continue Keppra 1500 mg bid. Her renal function normalized. Will sign off.             Signed By: Migue Cueto MD     May 6, 2017

## 2017-05-07 LAB
ALBUMIN SERPL BCP-MCNC: 2.6 G/DL (ref 3.4–5)
ALBUMIN/GLOB SERPL: 0.6 {RATIO} (ref 0.8–1.7)
ALP SERPL-CCNC: 73 U/L (ref 45–117)
ALT SERPL-CCNC: 53 U/L (ref 13–56)
ANION GAP BLD CALC-SCNC: 9 MMOL/L (ref 3–18)
AST SERPL W P-5'-P-CCNC: 51 U/L (ref 15–37)
BACTERIA SPEC CULT: NORMAL
BACTERIA SPEC CULT: NORMAL
BASOPHILS # BLD AUTO: 0 K/UL (ref 0–0.06)
BASOPHILS # BLD: 0 % (ref 0–2)
BILIRUB SERPL-MCNC: 0.5 MG/DL (ref 0.2–1)
BUN SERPL-MCNC: 6 MG/DL (ref 7–18)
BUN/CREAT SERPL: 8 (ref 12–20)
CALCIUM SERPL-MCNC: 8.4 MG/DL (ref 8.5–10.1)
CHLORIDE SERPL-SCNC: 110 MMOL/L (ref 100–108)
CK MB CFR SERPL CALC: 0.7 % (ref 0–4)
CK MB SERPL-MCNC: 1.5 NG/ML (ref 5–25)
CK SERPL-CCNC: 202 U/L (ref 26–192)
CO2 SERPL-SCNC: 23 MMOL/L (ref 21–32)
CREAT SERPL-MCNC: 0.8 MG/DL (ref 0.6–1.3)
DIFFERENTIAL METHOD BLD: ABNORMAL
EOSINOPHIL # BLD: 0.2 K/UL (ref 0–0.4)
EOSINOPHIL NFR BLD: 3 % (ref 0–5)
ERYTHROCYTE [DISTWIDTH] IN BLOOD BY AUTOMATED COUNT: 13.3 % (ref 11.6–14.5)
GLOBULIN SER CALC-MCNC: 4.4 G/DL (ref 2–4)
GLUCOSE BLD STRIP.AUTO-MCNC: 100 MG/DL (ref 70–110)
GLUCOSE BLD STRIP.AUTO-MCNC: 86 MG/DL (ref 70–110)
GLUCOSE BLD STRIP.AUTO-MCNC: 86 MG/DL (ref 70–110)
GLUCOSE BLD STRIP.AUTO-MCNC: 97 MG/DL (ref 70–110)
GLUCOSE SERPL-MCNC: 76 MG/DL (ref 74–99)
HCT VFR BLD AUTO: 38 % (ref 35–45)
HGB BLD-MCNC: 12.6 G/DL (ref 12–16)
LYMPHOCYTES # BLD AUTO: 25 % (ref 21–52)
LYMPHOCYTES # BLD: 1.5 K/UL (ref 0.9–3.6)
MCH RBC QN AUTO: 30.5 PG (ref 24–34)
MCHC RBC AUTO-ENTMCNC: 33.2 G/DL (ref 31–37)
MCV RBC AUTO: 92 FL (ref 74–97)
MONOCYTES # BLD: 0.4 K/UL (ref 0.05–1.2)
MONOCYTES NFR BLD AUTO: 7 % (ref 3–10)
NEUTS SEG # BLD: 3.8 K/UL (ref 1.8–8)
NEUTS SEG NFR BLD AUTO: 65 % (ref 40–73)
PLATELET # BLD AUTO: 151 K/UL (ref 135–420)
PMV BLD AUTO: 10.5 FL (ref 9.2–11.8)
POTASSIUM SERPL-SCNC: 3.8 MMOL/L (ref 3.5–5.5)
PROT SERPL-MCNC: 7 G/DL (ref 6.4–8.2)
RBC # BLD AUTO: 4.13 M/UL (ref 4.2–5.3)
SERVICE CMNT-IMP: NORMAL
SERVICE CMNT-IMP: NORMAL
SODIUM SERPL-SCNC: 142 MMOL/L (ref 136–145)
TROPONIN I SERPL-MCNC: 0.04 NG/ML (ref 0–0.04)
WBC # BLD AUTO: 5.9 K/UL (ref 4.6–13.2)

## 2017-05-07 PROCEDURE — 74011250637 HC RX REV CODE- 250/637

## 2017-05-07 PROCEDURE — 77030020263 HC SOL INJ SOD CL0.9% LFCR 1000ML

## 2017-05-07 PROCEDURE — 85025 COMPLETE CBC W/AUTO DIFF WBC: CPT | Performed by: HOSPITALIST

## 2017-05-07 PROCEDURE — 65270000029 HC RM PRIVATE

## 2017-05-07 PROCEDURE — 74011250637 HC RX REV CODE- 250/637: Performed by: HOSPITALIST

## 2017-05-07 PROCEDURE — 77010033678 HC OXYGEN DAILY

## 2017-05-07 PROCEDURE — 93005 ELECTROCARDIOGRAM TRACING: CPT

## 2017-05-07 PROCEDURE — 82962 GLUCOSE BLOOD TEST: CPT

## 2017-05-07 PROCEDURE — 82550 ASSAY OF CK (CPK): CPT | Performed by: HOSPITALIST

## 2017-05-07 PROCEDURE — 36415 COLL VENOUS BLD VENIPUNCTURE: CPT | Performed by: HOSPITALIST

## 2017-05-07 PROCEDURE — 80053 COMPREHEN METABOLIC PANEL: CPT | Performed by: HOSPITALIST

## 2017-05-07 PROCEDURE — 74011250636 HC RX REV CODE- 250/636: Performed by: HOSPITALIST

## 2017-05-07 RX ORDER — NITROGLYCERIN 0.4 MG/1
TABLET SUBLINGUAL
Status: COMPLETED
Start: 2017-05-07 | End: 2017-05-07

## 2017-05-07 RX ORDER — ONDANSETRON 4 MG/1
4 TABLET, ORALLY DISINTEGRATING ORAL
Status: DISCONTINUED | OUTPATIENT
Start: 2017-05-07 | End: 2017-05-08 | Stop reason: HOSPADM

## 2017-05-07 RX ORDER — NITROGLYCERIN 0.4 MG/1
0.4 TABLET SUBLINGUAL AS NEEDED
Status: DISCONTINUED | OUTPATIENT
Start: 2017-05-07 | End: 2017-05-08 | Stop reason: HOSPADM

## 2017-05-07 RX ADMIN — LEVETIRACETAM 1500 MG: 500 TABLET ORAL at 08:38

## 2017-05-07 RX ADMIN — ONDANSETRON 4 MG: 4 TABLET, ORALLY DISINTEGRATING ORAL at 13:11

## 2017-05-07 RX ADMIN — NITROGLYCERIN: 0.4 TABLET SUBLINGUAL at 00:26

## 2017-05-07 RX ADMIN — ENOXAPARIN SODIUM 40 MG: 40 INJECTION SUBCUTANEOUS at 17:04

## 2017-05-07 RX ADMIN — Medication 10 ML: at 05:53

## 2017-05-07 RX ADMIN — LEVETIRACETAM 1500 MG: 500 TABLET ORAL at 17:04

## 2017-05-07 RX ADMIN — Medication 20 ML: at 13:00

## 2017-05-07 NOTE — PROGRESS NOTES
rrt called. Patient complaining of chest pain. Patient evaluated at bedside. She appears to be sleeping and is difficult to arouse. Once \"awake\" she answers with one word and then goes back to sleep (or state of catatonia). Patient evaluation is challenging. She was given one sublingual tab. After 5 minutes, she denied chest pain after having to be aroused again. Additional orders included EKG and troponin.       Visit Vitals    /89    Pulse 73    Temp 98.1 °F (36.7 °C)    Resp 18    Ht 5' 4\" (1.626 m)    Wt 79.8 kg (176 lb)    SpO2 98%    BMI 30.21 kg/m2       Rita De Guzman MD

## 2017-05-07 NOTE — PROGRESS NOTES
1910 Bedside and Verbal shift change report given to NELSON Damon RN (oncoming nurse) by TORY Evans RN (offgoing nurse). Report included the following information SBAR, Kardex, Intake/Output, MAR and Recent Results. Patient in bed awake with sitter present. Bed in low position, call bell within reach, and patient offered no complains. Will continue to monitor. 2038 Shift assessment completed. Patient denies having any pain. Will continue to monitor. 0000 Pt's blood sugar is 97, held insulin (Humalog) at this time. Reassessment completed. Patient in bed sleeping with sitter present. No signs of distress noted. Will continue to monitor. 0002 Notified by tele tech pt had 7 vtach beats heart rate 152. Patient in bed sleeping. Called patient's name 4 times before she responded to voice. Patient now awake, heart rate 89.    0009 Notified by tele tech while still in pt's room, pt had 10 vtach beats heart rate 147. Patient is complaining of chest pain. Patient already has 3L 02 via nasal cannula on. RRT called. See RRT note. 0016 Pt's vitals: /92 Pulse 73 O2 stat 100% respirations 22.     0020 Colbert patient cough for first time. Pt's cough sounded very dry. Dr. Mojgan Rodriguez is aware. 9068 Verbal order received from Dr. Mojgan Rodriguez to administer sublingual nitroglycerin tablet. Pt's vitals: /89 Pulse 73 O2 stat 96% respirations 22. Administered nitroglycerin sublingual tablet. [id number] Patient stated she is no longer having chest pain. Orders received from Dr. Mojgan Rodriguez for stat troponin lab. 0045 Blood drawn for Lab from PICC line red hub. Patient tolerated well. Patient in bed asleep. Will continue to monitor.      0200 Patient in bed awake and denies having any chest pain or discomfort. Patient asked about her evening medications, wondering if there's any due at this time. Went over pt's med list with patient and made pt aware when next medications are scheduled. Will continue to monitor patient. 1866 Reassessment completed. Patient in bed awake with sitter present. Patient denies any pain or discomfort. Will continue to monitor. 0 Checked on patient. Patient in bed awake with sitter present. Noticed pt's PICC line out, no bleeding present, and pt denies any pain. Sitter states \"I helped patient to the bathroom around 04:30 a.m, at first the IV was wrapped around the IV pole. I unwrapped the IV, assisted pt to bathroom and bed, and the PICC was still intact. I've kept my eyes on her and didn't see when it came out. \" PICC site cleaned, and applied gauze with transparent dressing. Will call hospitalist and continue to monitor. 2254 Notified Dr. Mojgan Rodriguez concerning pt's PICC line. No orders received. Instruction received to wait until the morning for replacement. Will continue to monitor. 0600 Patient's blood sugar is 100. Insulin (Humalog) held at this time.         0800 Bedside and Verbal shift change report given to Zoila Ramsey RN (oncoming nurse) by Missy Ramos RN (offgoing nurse). Report included the following information SBAR, Kardex, Intake/Output, MAR and Recent Results.

## 2017-05-07 NOTE — PROGRESS NOTES
0019-RRT called for patient with c/o chest pain, pt assessed, lying in bed, no distress noted, SL Nitro x1 given, EKG complete, labs ordered, after SL nitro given pt reported relief of pain, of note pt has frequent strong cough

## 2017-05-07 NOTE — PROGRESS NOTES
0730--Bedside shift change report given to NIHARIKA bAebe (oncoming nurse) by Mele Ching RN (offgoing nurse). Report included the following information SBAR, Kardex, MAR, Recent Results and Cardiac Rhythm NSR with short periods of VTach overnight. Sitter at bedside. 0900--Pt tolerated PO meds with Nectar thick liquids. Awake and alert, oriented x Person, Place. Sitter at bedside. Refusing to eat Bfast, c/o nausea. Sitter at bedside. 1015--MD order received for zofran. Pt now sleeping, will reassess when awake. Sitter at bedside. 1400--Pt medicated for nausea. Pt with poor appetite, family at bedside states she has become a poor eater at home as well. Pt alert with family most of day. Sitter remains at bedside. 1600--Order changed to DM diet, pt remains poor appetite. Tolerating thin liquids and po pills well with HOB at 90 and fully awake. Pt will wax and wane between alert and difficult to arouse. Family at bedside. 1900--Bedside shift change report given to Mele Ching RN (oncoming nurse) by Toya Fuentes RN (offgoing nurse). Report included the following information SBAR, Kardex, MAR, Recent Results and Cardiac Rhythm NSR.

## 2017-05-07 NOTE — PROGRESS NOTES
Progress Note      Patient: aCte Jimenez               Sex: female          DOA: 5/1/2017       YOB: 1955      Age:  57 y.o.        LOS:  LOS: 6 days               Subjective:   Pt is drowsy and is confused . she wakens intermittently and it is difficult to see what is causing her to be so confused . Her ct of the head is appropriate for the age . she is not febrile and her wbc is 5.9 .she has a normal swallow evaluation . she does have a h/o polysubstance abuse  and this may be the etiology   She has not had any seizure activity in the past few days . [place] [employer] The pt pulled out her picc line . will give her keppra po . No c/o chest pain    Objective:      Visit Vitals    /80 (BP 1 Location: Left arm, BP Patient Position: At rest)    Pulse 68    Temp 98.3 °F (36.8 °C)    Resp 18    Ht 5' 4\" (1.626 m)    Wt 79.8 kg (176 lb)    SpO2 98%    BMI 30.21 kg/m2       Physical Exam:  Pt is drowsy and it is difficult to arouse her . Heart reg rate and rhythm   Lungs good breath sounds heard   Abdomen soft and no masses felt .   Neuro sleeping      Lab/Data Reviewed:  CMP:   Lab Results   Component Value Date/Time     05/07/2017 07:10 AM    K 3.8 05/07/2017 07:10 AM     (H) 05/07/2017 07:10 AM    CO2 23 05/07/2017 07:10 AM    AGAP 9 05/07/2017 07:10 AM    GLU 76 05/07/2017 07:10 AM    BUN 6 (L) 05/07/2017 07:10 AM    CREA 0.80 05/07/2017 07:10 AM    GFRAA >60 05/07/2017 07:10 AM    GFRNA >60 05/07/2017 07:10 AM    CA 8.4 (L) 05/07/2017 07:10 AM    ALB 2.6 (L) 05/07/2017 07:10 AM    TP 7.0 05/07/2017 07:10 AM    GLOB 4.4 (H) 05/07/2017 07:10 AM    AGRAT 0.6 (L) 05/07/2017 07:10 AM    SGOT 51 (H) 05/07/2017 07:10 AM    ALT 53 05/07/2017 07:10 AM     CBC:   Lab Results   Component Value Date/Time    WBC 5.9 05/07/2017 07:10 AM    HGB 12.6 05/07/2017 07:10 AM    HCT 38.0 05/07/2017 07:10 AM     05/07/2017 07:10 AM           Assessment/Plan     Principal Problem:    Status epilepticus ([facility] [address] (5/1/2017)    Active Problems:    Diabetes ()      Hypertension ()      Dyslipidemia ()      Acute respiratory failure ([facility] [address] (5/1/2017)  Encephalopathy -etiology unclear  R/o effect of drugs . Pt has a h/o polysubstance abuse       Plan: will continue supportive care . The keppra will be given po if no iv can be obtained .

## 2017-05-07 NOTE — PROGRESS NOTES
1920 Bedside and Verbal shift change report given to NELSON Silva RN (oncoming nurse) by Miller Lu RN (offgoing nurse). Report included the following information SBAR, Kardex, Intake/Output, MAR and Recent Results. Patient in bed awake with sitter present. Bed in low position, call bell within reach, and patient offered no complains. Made aware by RN Miller Lu no IV access is needed at this time and that the Dr has discontinued all IV fluids. Will continue to monitor. 2045 Shift assessment completed. Patient denies having any pain. Will continue to monitor. 2336 Patient's blood sugar is 86, insulin (Humalog) held at this time. Will continue to monitor. 0050 Reassessment completed. Patient in bed sleeping. No changes noted. Will continue to monitor. 0200 Notified by sitter patient has been twitching and appear restless than usual. Patient does not appear cold, clammy, diaphoretic, or jittery. Patient's blood sugar is 106. Will continue to monitor. 8162 Reassessment completed. Patient in bed sleeping with no signs of distress noted. Will continue to monitor. 3223 Patient's blood sugar is 82, insulin (Humalog) held at this time. Will continue to monitor. 0730 Bedside and Verbal shift change report given to Enedelia Westfall Se (oncoming nurse) by Temo Solares RN (offgoing nurse). Report included the following information SBAR, Kardex, Intake/Output, MAR and Recent Results.

## 2017-05-08 ENCOUNTER — HOME HEALTH ADMISSION (OUTPATIENT)
Dept: HOME HEALTH SERVICES | Facility: HOME HEALTH | Age: 62
End: 2017-05-08

## 2017-05-08 ENCOUNTER — APPOINTMENT (OUTPATIENT)
Dept: GENERAL RADIOLOGY | Age: 62
DRG: 100 | End: 2017-05-08
Attending: PHYSICIAN ASSISTANT
Payer: SELF-PAY

## 2017-05-08 VITALS
DIASTOLIC BLOOD PRESSURE: 69 MMHG | OXYGEN SATURATION: 95 % | BODY MASS INDEX: 30.05 KG/M2 | SYSTOLIC BLOOD PRESSURE: 120 MMHG | TEMPERATURE: 98.4 F | HEIGHT: 64 IN | WEIGHT: 176 LBS | HEART RATE: 70 BPM | RESPIRATION RATE: 18 BRPM

## 2017-05-08 LAB
ALBUMIN SERPL BCP-MCNC: 2.7 G/DL (ref 3.4–5)
ALBUMIN/GLOB SERPL: 0.6 {RATIO} (ref 0.8–1.7)
ALP SERPL-CCNC: 75 U/L (ref 45–117)
ALT SERPL-CCNC: 55 U/L (ref 13–56)
ANION GAP BLD CALC-SCNC: 12 MMOL/L (ref 3–18)
AST SERPL W P-5'-P-CCNC: 50 U/L (ref 15–37)
ATRIAL RATE: 70 BPM
BASOPHILS # BLD AUTO: 0 K/UL (ref 0–0.06)
BASOPHILS # BLD: 0 % (ref 0–2)
BILIRUB SERPL-MCNC: 0.6 MG/DL (ref 0.2–1)
BUN SERPL-MCNC: 10 MG/DL (ref 7–18)
BUN/CREAT SERPL: 12 (ref 12–20)
CALCIUM SERPL-MCNC: 8.4 MG/DL (ref 8.5–10.1)
CALCULATED P AXIS, ECG09: 51 DEGREES
CALCULATED R AXIS, ECG10: 2 DEGREES
CALCULATED T AXIS, ECG11: 19 DEGREES
CHLORIDE SERPL-SCNC: 109 MMOL/L (ref 100–108)
CO2 SERPL-SCNC: 23 MMOL/L (ref 21–32)
CREAT SERPL-MCNC: 0.82 MG/DL (ref 0.6–1.3)
DIAGNOSIS, 93000: NORMAL
DIFFERENTIAL METHOD BLD: ABNORMAL
EOSINOPHIL # BLD: 0.2 K/UL (ref 0–0.4)
EOSINOPHIL NFR BLD: 4 % (ref 0–5)
ERYTHROCYTE [DISTWIDTH] IN BLOOD BY AUTOMATED COUNT: 13.2 % (ref 11.6–14.5)
GLOBULIN SER CALC-MCNC: 4.4 G/DL (ref 2–4)
GLUCOSE BLD STRIP.AUTO-MCNC: 106 MG/DL (ref 70–110)
GLUCOSE BLD STRIP.AUTO-MCNC: 81 MG/DL (ref 70–110)
GLUCOSE BLD STRIP.AUTO-MCNC: 82 MG/DL (ref 70–110)
GLUCOSE SERPL-MCNC: 75 MG/DL (ref 74–99)
HCT VFR BLD AUTO: 38.3 % (ref 35–45)
HGB BLD-MCNC: 12.8 G/DL (ref 12–16)
LYMPHOCYTES # BLD AUTO: 32 % (ref 21–52)
LYMPHOCYTES # BLD: 2.1 K/UL (ref 0.9–3.6)
MCH RBC QN AUTO: 30.8 PG (ref 24–34)
MCHC RBC AUTO-ENTMCNC: 33.4 G/DL (ref 31–37)
MCV RBC AUTO: 92.1 FL (ref 74–97)
MONOCYTES # BLD: 0.6 K/UL (ref 0.05–1.2)
MONOCYTES NFR BLD AUTO: 9 % (ref 3–10)
NEUTS SEG # BLD: 3.7 K/UL (ref 1.8–8)
NEUTS SEG NFR BLD AUTO: 55 % (ref 40–73)
P-R INTERVAL, ECG05: 116 MS
PLATELET # BLD AUTO: 166 K/UL (ref 135–420)
PMV BLD AUTO: 10.3 FL (ref 9.2–11.8)
POTASSIUM SERPL-SCNC: 3.6 MMOL/L (ref 3.5–5.5)
PROT SERPL-MCNC: 7.1 G/DL (ref 6.4–8.2)
Q-T INTERVAL, ECG07: 454 MS
QRS DURATION, ECG06: 78 MS
QTC CALCULATION (BEZET), ECG08: 490 MS
RBC # BLD AUTO: 4.16 M/UL (ref 4.2–5.3)
SODIUM SERPL-SCNC: 144 MMOL/L (ref 136–145)
VENTRICULAR RATE, ECG03: 70 BPM
WBC # BLD AUTO: 6.7 K/UL (ref 4.6–13.2)

## 2017-05-08 PROCEDURE — 74011250636 HC RX REV CODE- 250/636: Performed by: PHYSICIAN ASSISTANT

## 2017-05-08 PROCEDURE — 74011250636 HC RX REV CODE- 250/636: Performed by: HOSPITALIST

## 2017-05-08 PROCEDURE — 85025 COMPLETE CBC W/AUTO DIFF WBC: CPT | Performed by: HOSPITALIST

## 2017-05-08 PROCEDURE — 36415 COLL VENOUS BLD VENIPUNCTURE: CPT | Performed by: HOSPITALIST

## 2017-05-08 PROCEDURE — 92526 ORAL FUNCTION THERAPY: CPT

## 2017-05-08 PROCEDURE — 74011000250 HC RX REV CODE- 250: Performed by: PHYSICIAN ASSISTANT

## 2017-05-08 PROCEDURE — 80053 COMPREHEN METABOLIC PANEL: CPT | Performed by: HOSPITALIST

## 2017-05-08 PROCEDURE — 82962 GLUCOSE BLOOD TEST: CPT

## 2017-05-08 PROCEDURE — C9113 INJ PANTOPRAZOLE SODIUM, VIA: HCPCS | Performed by: PHYSICIAN ASSISTANT

## 2017-05-08 PROCEDURE — 74011250637 HC RX REV CODE- 250/637: Performed by: HOSPITALIST

## 2017-05-08 RX ORDER — LEVETIRACETAM 750 MG/1
1500 TABLET ORAL 2 TIMES DAILY
Qty: 120 TAB | Refills: 0 | Status: SHIPPED | OUTPATIENT
Start: 2017-05-08 | End: 2018-06-30

## 2017-05-08 RX ADMIN — DEXTROSE MONOHYDRATE, SODIUM CHLORIDE, AND POTASSIUM CHLORIDE 125 ML/HR: 50; 4.5; 1.49 INJECTION, SOLUTION INTRAVENOUS at 09:03

## 2017-05-08 RX ADMIN — LEVETIRACETAM 1500 MG: 500 TABLET ORAL at 08:46

## 2017-05-08 RX ADMIN — SODIUM CHLORIDE 40 MG: 9 INJECTION INTRAMUSCULAR; INTRAVENOUS; SUBCUTANEOUS at 08:46

## 2017-05-08 NOTE — DISCHARGE INSTRUCTIONS
DISCHARGE SUMMARY from Nurse    The following personal items are in your possession at time of discharge:    Dental Appliances: None  Visual Aid: Glasses, At home     Home Medications: None  Jewelry: None  Clothing: None  Other Valuables: None             PATIENT INSTRUCTIONS:    After general anesthesia or intravenous sedation, for 24 hours or while taking prescription Narcotics:  · Limit your activities  · Do not drive and operate hazardous machinery  · Do not make important personal or business decisions  · Do  not drink alcoholic beverages  · If you have not urinated within 8 hours after discharge, please contact your surgeon on call. Report the following to your surgeon:  · Excessive pain, swelling, redness or odor of or around the surgical area  · Temperature over 100.5  · Nausea and vomiting lasting longer than 4 hours or if unable to take medications  · Any signs of decreased circulation or nerve impairment to extremity: change in color, persistent  numbness, tingling, coldness or increase pain  · Any questions        What to do at Home:  Recommended activity: Activity as tolerated    If you experience any of the following symptoms difficulty breathing, chest pain, rapid or irregular heart beat please follow up with Dr. Yanely Shankar      *  Please give a list of your current medications to your Primary Care Provider. *  Please update this list whenever your medications are discontinued, doses are      changed, or new medications (including over-the-counter products) are added. *  Please carry medication information at all times in case of emergency situations. These are general instructions for a healthy lifestyle:    No smoking/ No tobacco products/ Avoid exposure to second hand smoke    Surgeon General's Warning:  Quitting smoking now greatly reduces serious risk to your health.     Obesity, smoking, and sedentary lifestyle greatly increases your risk for illness    A healthy diet, regular physical exercise & weight monitoring are important for maintaining a healthy lifestyle    You may be retaining fluid if you have a history of heart failure or if you experience any of the following symptoms:  Weight gain of 3 pounds or more overnight or 5 pounds in a week, increased swelling in our hands or feet or shortness of breath while lying flat in bed. Please call your doctor as soon as you notice any of these symptoms; do not wait until your next office visit. Recognize signs and symptoms of STROKE:    F-face looks uneven    A-arms unable to move or move unevenly    S-speech slurred or non-existent    T-time-call 911 as soon as signs and symptoms begin-DO NOT go       Back to bed or wait to see if you get better-TIME IS BRAIN. Warning Signs of HEART ATTACK     Call 911 if you have these symptoms:   Chest discomfort. Most heart attacks involve discomfort in the center of the chest that lasts more than a few minutes, or that goes away and comes back. It can feel like uncomfortable pressure, squeezing, fullness, or pain.  Discomfort in other areas of the upper body. Symptoms can include pain or discomfort in one or both arms, the back, neck, jaw, or stomach.  Shortness of breath with or without chest discomfort.  Other signs may include breaking out in a cold sweat, nausea, or lightheadedness. Don't wait more than five minutes to call 911 - MINUTES MATTER! Fast action can save your life. Calling 911 is almost always the fastest way to get lifesaving treatment. Emergency Medical Services staff can begin treatment when they arrive -- up to an hour sooner than if someone gets to the hospital by car. The discharge information has been reviewed with the patient and spouse. The patient and spouse verbalized understanding. Discharge medications reviewed with the patient and spouse and appropriate educational materials and side effects teaching were provided.     [employer] Activation    Thank you for requesting access to [employer]. Please follow the instructions below to securely access and download your online medical record. [employer] allows you to send messages to your doctor, view your test results, renew your prescriptions, schedule appointments, and more. How Do I Sign Up? 1. In your internet browser, go to [email or web address]. 2. Click on the First Time User? Click Here link in the Sign In box. You will see the New Member Sign Up page. 3. Enter your [employer] Access Code exactly as it appears below. You will not need to use this code after youve completed the sign-up process. If you do not sign up before the expiration date, you must request a new code. [employer] Access Code: [email or web address]  Expires: 2017 10:46 AM (This is the date your [employer] access code will )    4. Enter the last four digits of your Social Security Number (xxxx) and Date of Birth (mm/dd/yyyy) as indicated and click Submit. You will be taken to the next sign-up page. 5. Create a [employer] ID. This will be your [employer] login ID and cannot be changed, so think of one that is secure and easy to remember. 6. Create a [employer] password. You can change your password at any time. 7. Enter your Password Reset Question and Answer. This can be used at a later time if you forget your password. 8. Enter your e-mail address. You will receive e-mail notification when new information is available in [address]. 9. Click Sign Up. You can now view and download portions of your medical record. 10. Click the Download Summary menu link to download a portable copy of your medical information. Additional Information    If you have questions, please visit the Frequently Asked Questions section of the [employer] website at [email or web address] Remember, [employer] is NOT to be used for urgent needs. For medical emergencies, dial 911.     Patient armband removed and shredded

## 2017-05-08 NOTE — PROGRESS NOTES
Received report assumed care. Patient in bed resting with eyes closed, seizure precautions continues. Sitter at bedside. Call light within reach. 1000-Family members present at bedside. Patient in bed resting quietly with eyes closed. Call light within reach. 1249-Family at bedside. She is eating her lunch in bed. 1330-Patient sitting on side of bed fully dressed. Family at bedside. Preparing for discharge home. Informed case management of discharge preparation and awaiting home health order.

## 2017-05-08 NOTE — PROGRESS NOTES
Offered the pt FOC for home care, she chose [facility].  and dgt at the bedside.  verified the contact information on the face sheet is correct. Referral sent to intake via [facility] and called to the [address] [place] [address] for f/u.

## 2017-05-08 NOTE — PROGRESS NOTES
Chart reviewed. Pt has NO insurance, NO payer source for SNF/rehab. Plan home with home health when medically stable, will need orders, thanks. Will cont to follow. Malika Stevenson RN,ext. [phone].

## 2017-05-08 NOTE — PROGRESS NOTES
Problem: Dysphagia (Adult)  Goal: *Acute Goals and Plan of Care (Insert Text)  Recommendations:  Diet: regular  Meds: per pt preference  Aspiration Precautions  Oral Care TID    Goals: Patient will:  1. Tolerate PO trials with 0 s/s overt distress in 4/5 trials - goal met  2. Utilize compensatory swallow strategies/maneuvers (decrease bite/sip, size/rate, alt. liq/sol) with min cues in 4/5 trials - goal met  3. Complete an objective swallow study (i.e., MBSS) to assess swallow integrity, r/o aspiration, and determine of safest LRD, min A - goal met 5/5/17   Outcome: Resolved/Met Date Met:  05/08/17  SPEECH LANGUAGE PATHOLOGY DYSPHAGIA TREATMENT & DISCHARGE     Patient: Christine Alejandro (57 y.o. female)  Date: 5/8/2017  Diagnosis: Status epilepticus ([facility] [address]  Acute respiratory failure (HCC) Status epilepticus ([facility] [address]       Precautions:  Fall      ASSESSMENT:  Observed pt with regular solid/thin liquid meal this am. Pt exhibited adequate bolus cohesion, manipulation and A-P transit. Further exhibited adequate swallow timing/reflex and hyolaryngeal excursion. Able to manipulate and clear with 0 clinical s/s aspiration and/or oropharyngeal dysphagia. Pt safe for regular solid, thin liquid diet. .Maximum therapeutic gains met; safest, least restrictive diet achieved in current in-patient/acute setting. Accordingly, SLP to d/c intervention at this time. Progression toward goals:  [X]         Improving appropriately - goals met/approximated  [ ]         Not making progress/Not appropriate - will d/c POC       PLAN:  Recommendations and Planned Interventions:  Maximum therapeutic gains met; safest, least restrictive diet achieved. D/C ST intervention at this time. Discharge Recommendations:  None       SUBJECTIVE:   Patient stated Sandy Bonilla.       OBJECTIVE:   Cognitive and Communication Status:  Neurologic State: Alert  Orientation Level: Oriented to person, Oriented to place, Disoriented to situation, Disoriented to time  Cognition: Follows commands  Perception: Appears intact  Perseveration: No perseveration noted  Safety/Judgement: Fall prevention  Dysphagia Treatment:  Oral Assessment:  Oral Assessment  Labial: No impairment  Dentition: Limited  Oral Hygiene: Fair  Lingual: No impairment  Velum: No impairment  Mandible: No impairment  P.O. Trials:              Patient Position: [facility]              Vocal quality prior to P.O.: No impairment              Consistency Presented: Thin liquid, Solid              How Presented: Self-fed/presented                             Bolus Acceptance: No impairment              Bolus Formation/Control: Impaired              Type of Impairment: Delayed, Mastication              Propulsion: Delayed (# of seconds), Discoordination              Oral Residue: Less than 10% of bolus, Lingual              Initiation of Swallow: No impairment              Laryngeal Elevation: Functional              Aspiration Signs/Symptoms: None              Pharyngeal Phase Characteristics: No impairment, issues, or problems               Effective Modifications: Small sips and bites              Cues for Modifications: None                                Oral Phase Severity: Minimal              Pharyngeal Phase Severity : No impairment                       PAIN:  Start of Tx: 0  End of Tx: 0      After treatment:   [ ]              Patient left in no apparent distress sitting up in chair  [X]              Patient left in no apparent distress in bed  [X]              Call bell left within reach  [X]              Nursing notified  [ ]              Caregiver present  [ ]              Bed alarm activated         COMMUNICATION/EDUCATION:   [X]        Aspiration precautions; swallow safety; compensatory techniques  [ ]        Patient unable to participate in education; education ongoing with staff  [ ]         Posted safety precautions in patient's room.   [ ]         Oral-motor/laryngeal strengthening exercises Iris Griffin, SLP  Time Calculation: 14 mins

## 2017-05-08 NOTE — PROGRESS NOTES
Care Management Interventions  PCP Verified by CM:  Yes  Transition of Care Consult (CM Consult): [address]: Yes  Current Support Network: Lives with Spouse  Plan discussed with Pt/Family/Caregiver: Yes  Freedom of Choice Offered: Yes  Discharge Location  Discharge Placement: Home with home health

## 2017-05-09 ENCOUNTER — HOME CARE VISIT (OUTPATIENT)
Dept: HOME HEALTH SERVICES | Facility: HOME HEALTH | Age: 62
End: 2017-05-09

## 2017-05-10 ENCOUNTER — HOME CARE VISIT (OUTPATIENT)
Dept: SCHEDULING | Facility: HOME HEALTH | Age: 62
End: 2017-05-10

## 2017-05-10 ENCOUNTER — HOME CARE VISIT (OUTPATIENT)
Dept: HOME HEALTH SERVICES | Facility: HOME HEALTH | Age: 62
End: 2017-05-10

## 2017-05-10 VITALS
BODY MASS INDEX: 25.95 KG/M2 | DIASTOLIC BLOOD PRESSURE: 58 MMHG | TEMPERATURE: 97 F | HEIGHT: 64 IN | OXYGEN SATURATION: 96 % | RESPIRATION RATE: 18 BRPM | WEIGHT: 152 LBS | HEART RATE: 100 BPM | SYSTOLIC BLOOD PRESSURE: 110 MMHG

## 2017-05-10 PROCEDURE — G0299 HHS/HOSPICE OF RN EA 15 MIN: HCPCS

## 2017-05-10 PROCEDURE — 400013 HH SOC

## 2017-05-11 ENCOUNTER — HOME CARE VISIT (OUTPATIENT)
Dept: HOME HEALTH SERVICES | Facility: HOME HEALTH | Age: 62
End: 2017-05-11

## 2017-05-11 ENCOUNTER — HOME CARE VISIT (OUTPATIENT)
Dept: SCHEDULING | Facility: HOME HEALTH | Age: 62
End: 2017-05-11

## 2017-05-11 VITALS — SYSTOLIC BLOOD PRESSURE: 110 MMHG | DIASTOLIC BLOOD PRESSURE: 70 MMHG | HEART RATE: 77 BPM | OXYGEN SATURATION: 96 %

## 2017-05-11 PROCEDURE — G0151 HHCP-SERV OF PT,EA 15 MIN: HCPCS

## 2017-05-13 ENCOUNTER — HOME CARE VISIT (OUTPATIENT)
Dept: HOME HEALTH SERVICES | Facility: HOME HEALTH | Age: 62
End: 2017-05-13

## 2017-05-13 ENCOUNTER — HOME CARE VISIT (OUTPATIENT)
Dept: SCHEDULING | Facility: HOME HEALTH | Age: 62
End: 2017-05-13

## 2017-05-15 ENCOUNTER — HOME CARE VISIT (OUTPATIENT)
Dept: SCHEDULING | Facility: HOME HEALTH | Age: 62
End: 2017-05-15

## 2017-05-15 VITALS
DIASTOLIC BLOOD PRESSURE: 84 MMHG | OXYGEN SATURATION: 96 % | RESPIRATION RATE: 22 BRPM | HEART RATE: 86 BPM | TEMPERATURE: 97.4 F | SYSTOLIC BLOOD PRESSURE: 126 MMHG

## 2017-05-15 PROCEDURE — G0299 HHS/HOSPICE OF RN EA 15 MIN: HCPCS

## 2017-05-15 NOTE — ANCILLARY DISCHARGE INSTRUCTIONS
[employer]  Discharge Phone Call       After-Care Discharge Phone Call Questions:    Were you able to get your prescriptions filled? Comment:      [x] Yes  []No    Comment if answer is \"No\"   Are you taking your medication(s) as your doctor ordered? Do you understand the purpose of your medications? Comment:    [x] Yes  []No    Comment if answer is \"No\"   Are you taking any other medications that are not on the list?  Comment:      [x] Yes  []No    Comment if answer is \"Yes\"   Do you have any questions about your medications? No  Are you aware of potential side effects? Yes Comment:    [x] Yes  [x]No    Comment if answer is \"Yes\"   Did you make your follow-up appointments (if the hospital did not do this before  discharge)? Comment:    [x] Yes  []No    Comment if answer is \"No\"   Is there any reason you might not be able to keep your follow-up appointments? Comment:     [] Yes  [x]No    Comment if answer is \"Yes\"   Do you have any questions about your care plan? No  Are you aware of what health problems to be alert for? Yes   Comment:    [x] Yes  [x]No    Comment if answer is \"Yes\"   Do you have a good understanding of how you should manage your health? Comment:    [x] Yes  []No    Comment if answer is \"Yes\"   Do you know which symptoms to watch for that would mean you would need to call your doctor right away? Comment:      [x] Yes  []No    Comment if answer is \"No\"   Do you have any questions about the follow up process or any instructions that we have provided? Comment:    [] Yes  [x]No    Comment if answer is \"Yes\"   Did staff take your preferences into account?         [x] Yes  []No    Comment if answer is \"Yes\"

## 2017-05-16 ENCOUNTER — HOME CARE VISIT (OUTPATIENT)
Dept: SCHEDULING | Facility: HOME HEALTH | Age: 62
End: 2017-05-16

## 2017-05-16 PROCEDURE — G0155 HHCP-SVS OF CSW,EA 15 MIN: HCPCS

## 2017-05-17 ENCOUNTER — HOME CARE VISIT (OUTPATIENT)
Dept: SCHEDULING | Facility: HOME HEALTH | Age: 62
End: 2017-05-17

## 2017-05-17 VITALS
SYSTOLIC BLOOD PRESSURE: 116 MMHG | HEART RATE: 72 BPM | DIASTOLIC BLOOD PRESSURE: 70 MMHG | OXYGEN SATURATION: 96 % | TEMPERATURE: 97.6 F | RESPIRATION RATE: 14 BRPM

## 2017-05-17 PROCEDURE — G0299 HHS/HOSPICE OF RN EA 15 MIN: HCPCS

## 2017-05-18 ENCOUNTER — HOME CARE VISIT (OUTPATIENT)
Dept: SCHEDULING | Facility: HOME HEALTH | Age: 62
End: 2017-05-18

## 2017-05-18 PROCEDURE — G0153 HHCP-SVS OF S/L PATH,EA 15MN: HCPCS

## 2017-06-11 NOTE — DISCHARGE SUMMARY
.  Discharge Summary    Patient: Arsen Naidu               Sex: female          DOA: 5/1/2017         YOB: 1955      Age:  57 y.o.        LOS:  LOS: 7 days                Admit Date: 5/1/2017    Discharge Date: 6/11/2017    Admission Diagnoses: Status epilepticus ([facility]  Acute respiratory failure ([facility]    Discharge Diagnoses:    Problem List as of 5/8/2017  Date Reviewed: 5/1/2017          Codes Class Noted - Resolved    Acute respiratory failure ([facility] ICD-10-CM: J96.00  ICD-9-CM: 518.81  5/1/2017 - Present        * (Principal)Status epilepticus ([facility] ICD-10-CM: G40.901  ICD-9-CM: 345.3  5/1/2017 - Present        Diabetes ICD-10-CM: E11.9  ICD-9-CM: 250.00  Unknown - Present        Hypertension ICD-10-CM: I11.9  ICD-9-CM: 402.10  Unknown - Present        Dyslipidemia ICD-10-CM: E78.5  ICD-9-CM: 272.4  Unknown - Present        Bradycardia ICD-10-CM: R00.1  ICD-9-CM: 427.89  Unknown - Present        RESOLVED: Multiple transverse process fractures ICD-10-CM: [id number]  ICD-9-CM: 805.8  3/2/2015 - 4/28/2015    Overview Signed 3/2/2015  4:44 PM by MIGUEL Prescott     L2, 3 and 4             RESOLVED: Diabetes ([facility] ICD-10-CM: E11.9  ICD-9-CM: 250.00  3/2/2015 - 4/28/2015              Discharge Condition:  Improved    Hospital Course:pt was a 57year old female who  was admitted to [facility] (formerly [facility] because of a seizure disorder . She was seen to be in status  epilepticus and was treated with iv keppra in the er   and then was given versed and propofol and was intubated . The pt had been noncompliant with her home meds which was keppra . The pt was seen and followedby neurology . She remained drowsy for several days. the pt was extubated and did not have any further seizures , she was on keppra 1500 mg po bid . .she had an episode of chest pain on one occasion but no mi was seen . The pt was put on the floor and she slowly improved .  The pt's family was very anxious for her to leave the hospital and the pt was taken home before she was discharged . Consults:    Treatment Team: Scribe: Mervin Gerber; Consulting Provider: Taco Jaquez MD; Care Manager: Crow Alvarado RN; Nurse Practitioner: Mary Guillermo NP; Utilization Review: Sofia Drummond RN; Care Manager: Candice Stevens RN    Significant Diagnostic Studies:     Discharge Medications:   Cannot display discharge medications since this patient is not currently admitted. Activity:as tolerated . Pt left before being dc'd     Diet:    Wound Care:      Follow-up:

## 2018-06-28 ENCOUNTER — APPOINTMENT (OUTPATIENT)
Dept: GENERAL RADIOLOGY | Age: 63
End: 2018-06-28
Attending: EMERGENCY MEDICINE
Payer: SELF-PAY

## 2018-06-28 ENCOUNTER — APPOINTMENT (OUTPATIENT)
Dept: CT IMAGING | Age: 63
End: 2018-06-28
Attending: EMERGENCY MEDICINE
Payer: SELF-PAY

## 2018-06-28 ENCOUNTER — HOSPITAL ENCOUNTER (OUTPATIENT)
Age: 63
Setting detail: OBSERVATION
LOS: 2 days | Discharge: HOME | End: 2018-06-30
Attending: EMERGENCY MEDICINE | Admitting: HOSPITALIST
Payer: SELF-PAY

## 2018-06-28 DIAGNOSIS — G40.901 STATUS EPILEPTICUS (HCC): Primary | ICD-10-CM

## 2018-06-28 LAB
ALBUMIN SERPL-MCNC: 3.1 G/DL (ref 3.4–5)
ALBUMIN/GLOB SERPL: 0.7 {RATIO} (ref 0.8–1.7)
ALP SERPL-CCNC: 117 U/L (ref 45–117)
ALT SERPL-CCNC: 97 U/L (ref 13–56)
ANION GAP SERPL CALC-SCNC: 8 MMOL/L (ref 3–18)
APPEARANCE UR: CLEAR
AST SERPL-CCNC: 124 U/L (ref 15–37)
ATRIAL RATE: 106 BPM
BASOPHILS # BLD: 0 K/UL (ref 0–0.06)
BASOPHILS NFR BLD: 0 % (ref 0–2)
BILIRUB SERPL-MCNC: 0.2 MG/DL (ref 0.2–1)
BILIRUB UR QL: NEGATIVE
BUN SERPL-MCNC: 12 MG/DL (ref 7–18)
BUN/CREAT SERPL: 16 (ref 12–20)
CALCIUM SERPL-MCNC: 7.7 MG/DL (ref 8.5–10.1)
CALCULATED P AXIS, ECG09: 51 DEGREES
CALCULATED R AXIS, ECG10: -18 DEGREES
CALCULATED T AXIS, ECG11: 20 DEGREES
CHLORIDE SERPL-SCNC: 108 MMOL/L (ref 100–108)
CO2 SERPL-SCNC: 25 MMOL/L (ref 21–32)
COLOR UR: YELLOW
CREAT SERPL-MCNC: 0.77 MG/DL (ref 0.6–1.3)
DIAGNOSIS, 93000: NORMAL
DIFFERENTIAL METHOD BLD: ABNORMAL
EOSINOPHIL # BLD: 0.1 K/UL (ref 0–0.4)
EOSINOPHIL NFR BLD: 1 % (ref 0–5)
ERYTHROCYTE [DISTWIDTH] IN BLOOD BY AUTOMATED COUNT: 14.1 % (ref 11.6–14.5)
EST. AVERAGE GLUCOSE BLD GHB EST-MCNC: NORMAL MG/DL
GLOBULIN SER CALC-MCNC: 4.7 G/DL (ref 2–4)
GLUCOSE BLD STRIP.AUTO-MCNC: 94 MG/DL (ref 70–110)
GLUCOSE SERPL-MCNC: 123 MG/DL (ref 74–99)
GLUCOSE UR STRIP.AUTO-MCNC: NEGATIVE MG/DL
HBA1C MFR BLD: 4.6 % (ref 4.2–5.6)
HCT VFR BLD AUTO: 38.4 % (ref 35–45)
HGB BLD-MCNC: 12.9 G/DL (ref 12–16)
HGB UR QL STRIP: NEGATIVE
KETONES UR QL STRIP.AUTO: NEGATIVE MG/DL
LACTATE BLD-SCNC: 1.3 MMOL/L (ref 0.4–2)
LACTATE BLD-SCNC: 2.1 MMOL/L (ref 0.4–2)
LACTATE BLD-SCNC: 2.4 MMOL/L (ref 0.4–2)
LEUKOCYTE ESTERASE UR QL STRIP.AUTO: NEGATIVE
LYMPHOCYTES # BLD: 1.3 K/UL (ref 0.9–3.6)
LYMPHOCYTES NFR BLD: 21 % (ref 21–52)
MAGNESIUM SERPL-MCNC: 1.8 MG/DL (ref 1.6–2.6)
MCH RBC QN AUTO: 32.9 PG (ref 24–34)
MCHC RBC AUTO-ENTMCNC: 33.6 G/DL (ref 31–37)
MCV RBC AUTO: 98 FL (ref 74–97)
MONOCYTES # BLD: 0.4 K/UL (ref 0.05–1.2)
MONOCYTES NFR BLD: 6 % (ref 3–10)
NEUTS SEG # BLD: 4.4 K/UL (ref 1.8–8)
NEUTS SEG NFR BLD: 72 % (ref 40–73)
NITRITE UR QL STRIP.AUTO: NEGATIVE
P-R INTERVAL, ECG05: 116 MS
PH UR STRIP: 7.5 [PH] (ref 5–8)
PLATELET # BLD AUTO: 133 K/UL (ref 135–420)
PMV BLD AUTO: 10.3 FL (ref 9.2–11.8)
POTASSIUM SERPL-SCNC: 4.2 MMOL/L (ref 3.5–5.5)
PROT SERPL-MCNC: 7.8 G/DL (ref 6.4–8.2)
PROT UR STRIP-MCNC: NEGATIVE MG/DL
Q-T INTERVAL, ECG07: 356 MS
QRS DURATION, ECG06: 74 MS
QTC CALCULATION (BEZET), ECG08: 472 MS
RBC # BLD AUTO: 3.92 M/UL (ref 4.2–5.3)
SODIUM SERPL-SCNC: 141 MMOL/L (ref 136–145)
SP GR UR REFRACTOMETRY: 1.01 (ref 1–1.03)
UROBILINOGEN UR QL STRIP.AUTO: 0.2 EU/DL (ref 0.2–1)
VENTRICULAR RATE, ECG03: 106 BPM
WBC # BLD AUTO: 6.1 K/UL (ref 4.6–13.2)

## 2018-06-28 PROCEDURE — 74011250636 HC RX REV CODE- 250/636: Performed by: EMERGENCY MEDICINE

## 2018-06-28 PROCEDURE — 96366 THER/PROPH/DIAG IV INF ADDON: CPT

## 2018-06-28 PROCEDURE — 96375 TX/PRO/DX INJ NEW DRUG ADDON: CPT

## 2018-06-28 PROCEDURE — 83036 HEMOGLOBIN GLYCOSYLATED A1C: CPT | Performed by: NURSE PRACTITIONER

## 2018-06-28 PROCEDURE — 81003 URINALYSIS AUTO W/O SCOPE: CPT | Performed by: EMERGENCY MEDICINE

## 2018-06-28 PROCEDURE — 85025 COMPLETE CBC W/AUTO DIFF WBC: CPT | Performed by: EMERGENCY MEDICINE

## 2018-06-28 PROCEDURE — 83605 ASSAY OF LACTIC ACID: CPT

## 2018-06-28 PROCEDURE — 74011000258 HC RX REV CODE- 258: Performed by: HOSPITALIST

## 2018-06-28 PROCEDURE — 82962 GLUCOSE BLOOD TEST: CPT

## 2018-06-28 PROCEDURE — 87040 BLOOD CULTURE FOR BACTERIA: CPT | Performed by: EMERGENCY MEDICINE

## 2018-06-28 PROCEDURE — 80053 COMPREHEN METABOLIC PANEL: CPT | Performed by: EMERGENCY MEDICINE

## 2018-06-28 PROCEDURE — 71045 X-RAY EXAM CHEST 1 VIEW: CPT

## 2018-06-28 PROCEDURE — 74011250636 HC RX REV CODE- 250/636: Performed by: HOSPITALIST

## 2018-06-28 PROCEDURE — 65660000001 HC RM ICU INTERMED STEPDOWN

## 2018-06-28 PROCEDURE — 83735 ASSAY OF MAGNESIUM: CPT | Performed by: EMERGENCY MEDICINE

## 2018-06-28 PROCEDURE — 96361 HYDRATE IV INFUSION ADD-ON: CPT

## 2018-06-28 PROCEDURE — 99285 EMERGENCY DEPT VISIT HI MDM: CPT

## 2018-06-28 PROCEDURE — 96365 THER/PROPH/DIAG IV INF INIT: CPT

## 2018-06-28 PROCEDURE — 93005 ELECTROCARDIOGRAM TRACING: CPT

## 2018-06-28 PROCEDURE — 70450 CT HEAD/BRAIN W/O DYE: CPT

## 2018-06-28 PROCEDURE — 95816 EEG AWAKE AND DROWSY: CPT | Performed by: HOSPITALIST

## 2018-06-28 RX ORDER — HYDRALAZINE HYDROCHLORIDE 20 MG/ML
10 INJECTION INTRAMUSCULAR; INTRAVENOUS
Status: DISCONTINUED | OUTPATIENT
Start: 2018-06-28 | End: 2018-06-30 | Stop reason: HOSPADM

## 2018-06-28 RX ORDER — LEVETIRACETAM 15 MG/ML
1500 INJECTION INTRAVASCULAR ONCE
Status: COMPLETED | OUTPATIENT
Start: 2018-06-28 | End: 2018-06-28

## 2018-06-28 RX ORDER — ATORVASTATIN CALCIUM 20 MG/1
20 TABLET, FILM COATED ORAL
Status: DISCONTINUED | OUTPATIENT
Start: 2018-06-28 | End: 2018-06-30 | Stop reason: HOSPADM

## 2018-06-28 RX ORDER — CHOLECALCIFEROL (VITAMIN D3) 125 MCG
2000 CAPSULE ORAL DAILY
COMMUNITY
End: 2020-12-17

## 2018-06-28 RX ORDER — INSULIN LISPRO 100 [IU]/ML
INJECTION, SOLUTION INTRAVENOUS; SUBCUTANEOUS EVERY 6 HOURS
Status: DISCONTINUED | OUTPATIENT
Start: 2018-06-28 | End: 2018-06-29

## 2018-06-28 RX ORDER — DOCUSATE SODIUM 100 MG/1
100 CAPSULE, LIQUID FILLED ORAL 2 TIMES DAILY
Status: DISCONTINUED | OUTPATIENT
Start: 2018-06-28 | End: 2018-06-30 | Stop reason: HOSPADM

## 2018-06-28 RX ORDER — LEVETIRACETAM 10 MG/ML
1000 INJECTION INTRAVASCULAR EVERY 12 HOURS
Status: DISCONTINUED | OUTPATIENT
Start: 2018-06-28 | End: 2018-06-29

## 2018-06-28 RX ORDER — LORAZEPAM 2 MG/ML
2 INJECTION INTRAMUSCULAR
Status: DISCONTINUED | OUTPATIENT
Start: 2018-06-28 | End: 2018-06-30 | Stop reason: HOSPADM

## 2018-06-28 RX ORDER — DOCUSATE SODIUM 100 MG/1
100 CAPSULE, LIQUID FILLED ORAL 2 TIMES DAILY
COMMUNITY

## 2018-06-28 RX ORDER — LORAZEPAM 2 MG/ML
2 INJECTION INTRAMUSCULAR
Status: COMPLETED | OUTPATIENT
Start: 2018-06-28 | End: 2018-06-28

## 2018-06-28 RX ORDER — MAGNESIUM SULFATE 100 %
4 CRYSTALS MISCELLANEOUS AS NEEDED
Status: DISCONTINUED | OUTPATIENT
Start: 2018-06-28 | End: 2018-06-29 | Stop reason: SDUPTHER

## 2018-06-28 RX ORDER — FOLIC ACID 1 MG/1
1 TABLET ORAL DAILY
Status: DISCONTINUED | OUTPATIENT
Start: 2018-06-29 | End: 2018-06-30 | Stop reason: HOSPADM

## 2018-06-28 RX ORDER — SODIUM CHLORIDE 0.9 % (FLUSH) 0.9 %
5-10 SYRINGE (ML) INJECTION AS NEEDED
Status: DISCONTINUED | OUTPATIENT
Start: 2018-06-28 | End: 2018-06-28

## 2018-06-28 RX ORDER — VANCOMYCIN/0.9 % SOD CHLORIDE 1.5G/250ML
1500 PLASTIC BAG, INJECTION (ML) INTRAVENOUS ONCE
Status: COMPLETED | OUTPATIENT
Start: 2018-06-28 | End: 2018-06-28

## 2018-06-28 RX ORDER — MIRTAZAPINE 15 MG/1
15 TABLET, FILM COATED ORAL
COMMUNITY

## 2018-06-28 RX ORDER — LEVOFLOXACIN 5 MG/ML
750 INJECTION, SOLUTION INTRAVENOUS EVERY 24 HOURS
Status: DISCONTINUED | OUTPATIENT
Start: 2018-06-28 | End: 2018-06-28

## 2018-06-28 RX ORDER — DEXTROSE MONOHYDRATE AND SODIUM CHLORIDE 5; .45 G/100ML; G/100ML
75 INJECTION, SOLUTION INTRAVENOUS CONTINUOUS
Status: DISCONTINUED | OUTPATIENT
Start: 2018-06-28 | End: 2018-06-30 | Stop reason: HOSPADM

## 2018-06-28 RX ORDER — AMLODIPINE BESYLATE 5 MG/1
5 TABLET ORAL DAILY
Status: DISCONTINUED | OUTPATIENT
Start: 2018-06-29 | End: 2018-06-30 | Stop reason: HOSPADM

## 2018-06-28 RX ORDER — ACETAMINOPHEN 325 MG/1
650 TABLET ORAL
Status: DISCONTINUED | OUTPATIENT
Start: 2018-06-28 | End: 2018-06-30 | Stop reason: HOSPADM

## 2018-06-28 RX ORDER — SODIUM CHLORIDE 9 MG/ML
1000 INJECTION, SOLUTION INTRAVENOUS ONCE
Status: COMPLETED | OUTPATIENT
Start: 2018-06-28 | End: 2018-06-28

## 2018-06-28 RX ORDER — ONDANSETRON 2 MG/ML
4 INJECTION INTRAMUSCULAR; INTRAVENOUS
Status: DISCONTINUED | OUTPATIENT
Start: 2018-06-28 | End: 2018-06-30 | Stop reason: HOSPADM

## 2018-06-28 RX ORDER — ENOXAPARIN SODIUM 100 MG/ML
40 INJECTION SUBCUTANEOUS EVERY 24 HOURS
Status: DISCONTINUED | OUTPATIENT
Start: 2018-06-28 | End: 2018-06-30 | Stop reason: HOSPADM

## 2018-06-28 RX ORDER — DEXTROSE 50 % IN WATER (D50W) INTRAVENOUS SYRINGE
25-50 AS NEEDED
Status: DISCONTINUED | OUTPATIENT
Start: 2018-06-28 | End: 2018-06-29

## 2018-06-28 RX ORDER — FAMOTIDINE 20 MG/1
20 TABLET, FILM COATED ORAL 2 TIMES DAILY
Status: DISCONTINUED | OUTPATIENT
Start: 2018-06-28 | End: 2018-06-30 | Stop reason: HOSPADM

## 2018-06-28 RX ADMIN — SODIUM CHLORIDE 1000 ML: 900 INJECTION, SOLUTION INTRAVENOUS at 08:49

## 2018-06-28 RX ADMIN — VANCOMYCIN HYDROCHLORIDE 1500 MG: 10 INJECTION, POWDER, LYOPHILIZED, FOR SOLUTION INTRAVENOUS at 10:21

## 2018-06-28 RX ADMIN — LEVOFLOXACIN 750 MG: 5 INJECTION, SOLUTION INTRAVENOUS at 09:22

## 2018-06-28 RX ADMIN — ENOXAPARIN SODIUM 40 MG: 100 INJECTION SUBCUTANEOUS at 21:38

## 2018-06-28 RX ADMIN — LORAZEPAM 2 MG: 2 INJECTION, SOLUTION INTRAMUSCULAR; INTRAVENOUS at 10:21

## 2018-06-28 RX ADMIN — DEXTROSE MONOHYDRATE AND SODIUM CHLORIDE 75 ML/HR: 5; .45 INJECTION, SOLUTION INTRAVENOUS at 21:04

## 2018-06-28 RX ADMIN — LEVETIRACETAM 1500 MG: 15 INJECTION INTRAVENOUS at 09:18

## 2018-06-28 RX ADMIN — LEVETIRACETAM 1000 MG: 10 INJECTION INTRAVENOUS at 21:39

## 2018-06-28 NOTE — ED NOTES
TRANSFER - ED to INPATIENT REPORT:    SBAR report made available to receiving floor on this patient being transferred to  [phone])  for routine progression of care       Admitting diagnosis Epilepsy with status epilepticus ([facility]    Information from the following report(s) ED Summary, Intake/Output and Recent Results was made available to receiving floor. Lines:   Peripheral IV 06/28/18 Right Hand (Active)   Site Assessment Clean, dry, & intact 6/28/2018  8:46 AM   Phlebitis Assessment 0 6/28/2018  8:46 AM   Infiltration Assessment 0 6/28/2018  8:46 AM   Dressing Status Clean, dry, & intact 6/28/2018  8:46 AM   Hub Color/Line Status Pink 6/28/2018  8:46 AM       Peripheral IV 06/28/18 Left Arm (Active)   Site Assessment Clean, dry, & intact 6/28/2018  8:48 AM   Phlebitis Assessment 0 6/28/2018  8:48 AM   Infiltration Assessment 0 6/28/2018  8:48 AM   Dressing Status Clean, dry, & intact 6/28/2018  8:48 AM   Dressing Type Transparent 6/28/2018  8:48 AM        Medication list confirmed with patient(per medication bottles;  took medication home). Opportunity for questions and clarification was provided.       Patient is disoriented N/A  Patient is  incontinent and ambulatory with assist     Valuables transported with patient     Patient transported with:   Registered Nurse  Tech

## 2018-06-28 NOTE — ED TRIAGE NOTES
EMS reports Seizure like activity since 0400. EMS noted grand mal seizure; adm versed 5 mg nasally; Ativan 2 mg IV; .

## 2018-06-28 NOTE — H&P
GENERAL GENERIC H&P/CONSULT    Ventura Sandoval is a 60 y.o. female with a history of epilepsy who presents with a seizure around 4am with reports per EMR that last seizure was 1 year ago. Hx limited 2/2 patient condition. EMS was called and gave 5mg IV versed and 2mg Iv ativan with imrpovement. Patient is on keppra and complicant. Patient is postical. In ED temp 100.4. Tele neuro was consulted. Past Medical History:   Diagnosis Date    Diabetes     Dyslipidemia     Hypertension     Noncompliance     Polysubstance abuse     ETOH,  marijuana    Psychosis     Pulmonary hypertension     RVSP 60mmHg (ECHO 6/15)    Seizure disorder       History reviewed. No pertinent surgical history. Prior to Admission medications    Medication Sig Start Date End Date Taking? Authorizing Provider   mirtazapine (REMERON) 15 mg tablet Take 15 mg by mouth nightly. Yes Yaa Robles MD   cholecalciferol, vitamin D3, (VITAMIN D3) 2,000 unit tab Take 2,000 Int'l Units by mouth daily. Yes Yaa Robles MD   docusate sodium (COLACE) 100 mg capsule Take 100 mg by mouth two (2) times a day. Yes Yaa Robles MD   adalimumab 40 mg/0.8 mL pnkt 40 mg by SubCUTAneous route Once every 2 weeks. Indications: RHEUMATOID ARTHRITIS   Yes Yaa Robles MD   Omega-3 Fatty Acids-Fish Oil 360-1,200 mg cpDR Take 1,200 mg by mouth daily. Yes Yaa Robles MD   CALCIUM CARB/VIT D3/MINERALS (CALCIUM-VITAMIN D PO) Take 1,200 mg by mouth daily. Yes Yaa Robles MD   gabapentin (NEURONTIN) 100 mg capsule Take 100 mg by mouth three (3) times daily. Yes Historical Provider   chlorproMAZINE (THORAZINE) 50 mg tablet Take 25 mg by mouth two (2) times a day. Yes Historical Provider   venlafaxine-SR [facility] 150 mg capsule Take 150 mg by mouth daily. Yes Historical Provider   atorvastatin (LIPITOR) 20 mg tablet Take 20 mg by mouth daily. Yes Historical Provider   OLANZapine (ZYPREXA) 5 mg tablet Take 15 mg by mouth nightly.    Yes Phys Other, MD   levETIRAcetam (KEPPRA) 750 mg tablet Take 2 Tabs by mouth two (2) times a day. 5/8/17  Yes Jennifer Villegas MD   amLODIPine (NORVASC) 5 mg tablet Take 5 mg by mouth daily. Yes Yaa Robles MD   phenol throat spray (SORE THROAT, PHENOL,) 1.4 % spray Take 1 Spray by mouth as needed for Sore throat or pain/local anesthesia. Historical Provider   levETIRAcetam 1,000 mg tablet Take 1,000 mg by mouth two (2) times a day. TAKING 1.5 TAB BID TOTAL 1500MG    Historical Provider   oxyCODONE-acetaminophen (PERCOCET) 5-325 mg per tablet Take 1 tablet every 4-6 hours as needed for pain control. If you were instructed to try over the counter ibuprofen or tylenol, only take the percocet for pain not controlled with the over the counter medication. 7/23/15   MIGUEL Ramirez   atorvastatin (LIPITOR) 20 mg tablet Take 1 Tab by mouth nightly. 6/19/15   Clare Crisostomo MD   ondansetron hcl (ZOFRAN, AS HYDROCHLORIDE,) 4 mg tablet Take 1 Tab by mouth every eight (8) hours as needed for Nausea. 6/19/15   Lion Walters MD   loratadine (CLARITIN) 10 mg tablet Take 10 mg by mouth daily. Historical Provider   famotidine (PEPCID) 20 mg tablet Take 20 mg by mouth two (2) times a day. Yaa Robles MD   sertraline (ZOLOFT) 100 mg tablet Take 100 mg by mouth daily. Yaa Robles MD   folic acid (FOLVITE) 1 mg tablet Take 1 mg by mouth daily. Yaa Robles MD     Allergies   Allergen Reactions    Aspirin Swelling    Penicillins Unknown (comments)      Social History   Substance Use Topics    Smoking status: Current Every Day Smoker     Packs/day: 0.25    Smokeless tobacco: Not on file    Alcohol use Yes      Comment: occasional beer      History reviewed. No pertinent family history.    Review of Systems   Unable to perform ROS: Acuity of condition       Objective:          Patient Vitals for the past 8 hrs:   BP Temp Pulse Resp SpO2 Height Weight   06/28/18 1530 121/67 - - - 94 % - -   06/28/18 1200 142/73 - - - 96 % - -   06/28/18 1145 (!) 133/92 - - - 95 % - -   06/28/18 1139 139/76 - - - 97 % - -   06/28/18 1138 (!) 148/121 - - - - - -   06/28/18 1030 147/73 - - - 95 % - -   06/28/18 1021 148/69 - 85 20 97 % - -   06/28/18 1015 148/69 - - - - - -   06/28/18 0930 153/86 - - - 95 % - -   06/28/18 0844 - - - - - 5' 6\" (1.676 m) 70.8 kg (156 lb)   06/28/18 0800 (!) 129/103 100.4 °F (38 °C) (!) 111 19 97 % - -     Physical Exam   Constitutional: No distress. Neck: Normal range of motion. Cardiovascular: Normal rate. Pulmonary/Chest: Effort normal and breath sounds normal.   Abdominal: Soft. Bowel sounds are normal.   Neurological:   drowsy   Skin: She is not diaphoretic. Labs:    Recent Results (from the past 24 hour(s))   EKG, 12 LEAD, INITIAL    Collection Time: 06/28/18  8:10 AM   Result Value Ref Range    Ventricular Rate 104 BPM    Atrial Rate 104 BPM    P-R Interval 116 ms    QRS Duration 74 ms    Q-T Interval 352 ms    QTC Calculation (Bezet) 462 ms    Calculated P Axis 58 degrees    Calculated R Axis -16 degrees    Calculated T Axis 34 degrees    Diagnosis       Sinus tachycardia  Septal infarct , age undetermined  Abnormal ECG     POC LACTIC ACID    Collection Time: 06/28/18  8:24 AM   Result Value Ref Range    Lactic Acid (POC) 2.4 (HH) 0.4 - 2.0 mmol/L   CBC WITH AUTOMATED DIFF    Collection Time: 06/28/18  8:30 AM   Result Value Ref Range    WBC 6.1 4.6 - 13.2 K/uL    RBC 3.92 (L) 4.20 - 5.30 M/uL    HGB 12.9 12.0 - 16.0 g/dL    HCT 38.4 35.0 - 45.0 %    MCV 98.0 (H) 74.0 - 97.0 FL    MCH 32.9 24.0 - 34.0 PG    MCHC 33.6 31.0 - 37.0 g/dL    RDW 14.1 11.6 - 14.5 %    PLATELET 335 (L) 145 - 420 K/uL    MPV 10.3 9.2 - 11.8 FL    NEUTROPHILS 72 40 - 73 %    LYMPHOCYTES 21 21 - 52 %    MONOCYTES 6 3 - 10 %    EOSINOPHILS 1 0 - 5 %    BASOPHILS 0 0 - 2 %    ABS. NEUTROPHILS 4.4 1.8 - 8.0 K/UL    ABS. LYMPHOCYTES 1.3 0.9 - 3.6 K/UL    ABS. MONOCYTES 0.4 0.05 - 1.2 K/UL    ABS.  EOSINOPHILS 0.1 0.0 - 0.4 K/UL ABS. BASOPHILS 0.0 0.0 - 0.06 K/UL    DF AUTOMATED     MAGNESIUM    Collection Time: 06/28/18  9:25 AM   Result Value Ref Range    Magnesium 1.8 1.6 - 2.6 mg/dL   METABOLIC PANEL, COMPREHENSIVE    Collection Time: 06/28/18  9:25 AM   Result Value Ref Range    Sodium 141 136 - 145 mmol/L    Potassium 4.2 3.5 - 5.5 mmol/L    Chloride 108 100 - 108 mmol/L    CO2 25 21 - 32 mmol/L    Anion gap 8 3.0 - 18 mmol/L    Glucose 123 (H) 74 - 99 mg/dL    BUN 12 7.0 - 18 MG/DL    Creatinine 0.77 0.6 - 1.3 MG/DL    BUN/Creatinine ratio 16 12 - 20      GFR est AA >60 >60 ml/min/1.73m2    GFR est non-AA >60 >60 ml/min/1.73m2    Calcium 7.7 (L) 8.5 - 10.1 MG/DL    Bilirubin, total 0.2 0.2 - 1.0 MG/DL    ALT (SGPT) 97 (H) 13 - 56 U/L    AST (SGOT) 124 (H) 15 - 37 U/L    Alk.  phosphatase 117 45 - 117 U/L    Protein, total 7.8 6.4 - 8.2 g/dL    Albumin 3.1 (L) 3.4 - 5.0 g/dL    Globulin 4.7 (H) 2.0 - 4.0 g/dL    A-G Ratio 0.7 (L) 0.8 - 1.7     URINALYSIS W/ RFLX MICROSCOPIC    Collection Time: 06/28/18 11:30 AM   Result Value Ref Range    Color YELLOW      Appearance CLEAR      Specific gravity 1.009 1.005 - 1.030      pH (UA) 7.5 5.0 - 8.0      Protein NEGATIVE  NEG mg/dL    Glucose NEGATIVE  NEG mg/dL    Ketone NEGATIVE  NEG mg/dL    Bilirubin NEGATIVE  NEG      Blood NEGATIVE  NEG      Urobilinogen 0.2 0.2 - 1.0 EU/dL    Nitrites NEGATIVE  NEG      Leukocyte Esterase NEGATIVE  NEG     POC LACTIC ACID    Collection Time: 06/28/18 12:37 PM   Result Value Ref Range    Lactic Acid (POC) 2.1 (HH) 0.4 - 2.0 mmol/L           Assessment:  Principal Problem:    [facility] (6/28/2018)    Active Problems:    Diabetes ()      Hypertension ()      Dyslipidemia ()        Plan:    Epilepsy  -tele neuro  -post ictal  -seizure precautions  -ativan  -keppra  -keppra levels  -EEG    SIRS  -unclear source  -abx  -lungs?  -UA ok  -lactic elevated however expected likley with recent seizures  -IVF  -no luekcoytosis  -low grade temp  -blood cultures  -possible aspiration from seizure?     DM II  -corrective insulin when able to take PO    Essential HTN  -stable  -home meds    Transaminitis  -monitor    HLD  -statin    DVT prophayxlis  -scd/teds    Signed:  Irma Yu NP 6/28/2018

## 2018-06-28 NOTE — IP AVS SNAPSHOT
[address] [address] 
[phone] Patient: Denis Mckeon MRN: [id number] UTS:[id number]/33/[id number] About your hospitalization You were admitted on:  June 28, 2018 You last received care in the:  [address] [facility] You were discharged on:  June 30, 2018 Why you were hospitalized Your primary diagnosis was:  Epilepsy With Status Epilepticus (Hcc) Your diagnoses also included:  Diabetes (Hcc), Benign Hypertensive Heart Disease Without Heart Failure, Dyslipidemia, Seizure (Hcc) Follow-up Information Follow up With Details Comments Contact Info Yon Wilson MD Schedule an appointment as soon as possible for a visit in 1 week  [address] [place] [address] [address] [facility] [phone] Isaac Zaragoza MD Schedule an appointment as soon as possible for a visit Follow up in 1-2 weeks 300 [facility] 315 [facility] 83 [id number] [phone] Discharge Orders None A check óscar indicates which time of day the medication should be taken. My Medications CHANGE how you take these medications Instructions Each Dose to Equal  
 Morning Noon Evening Bedtime  
 levETIRAcetam 750 mg tablet Commonly known as:  KEPPRA What changed:   
- medication strength 
- how much to take 
- additional instructions - Another medication with the same name was removed. Continue taking this medication, and follow the directions you see here. Your last dose was: Your next dose is: Take 2 Tabs by mouth two (2) times a day. 1500 mg  
    
   
   
   
  
 OLANZapine 5 mg tablet Commonly known as:  ZyPREXA What changed:  Another medication with the same name was removed. Continue taking this medication, and follow the directions you see here. Your last dose was: Your next dose is: Take 15 mg by mouth nightly.   
 15 mg  
    
   
   
   
 * venlafaxine- mg capsule Commonly known as:  EFFEXOR-XR What changed:  Another medication with the same name was removed. Continue taking this medication, and follow the directions you see here. Your last dose was: Your next dose is: Take 150 mg by mouth daily. 150 mg  
    
   
   
   
  
 * venlafaxine 75 mg tablet Commonly known as:  [facility] What changed:  Another medication with the same name was removed. Continue taking this medication, and follow the directions you see here. Your last dose was: Your next dose is: Take 75 mg by mouth daily. 75 mg * Notice: This list has 2 medication(s) that are the same as other medications prescribed for you. Read the directions carefully, and ask your doctor or other care provider to review them with you. CONTINUE taking these medications Instructions Each Dose to Equal  
 Morning Noon Evening Bedtime  
 adalimumab 40 mg/0.8 mL injection pen Commonly known as:  HUMIRA Your last dose was: Your next dose is:    
   
   
 40 mg by SubCUTAneous route Once every 2 weeks. Indications: RHEUMATOID ARTHRITIS 40 mg  
    
   
   
   
  
 amLODIPine 5 mg tablet Commonly known as:  July Colon Your last dose was: Your next dose is: Take 5 mg by mouth daily. 5 mg * atorvastatin 20 mg tablet Commonly known as:  LIPITOR Your last dose was: Your next dose is: Take 20 mg by mouth daily. 20 mg  
    
   
   
   
  
 * atorvastatin 20 mg tablet Commonly known as:  LIPITOR Your last dose was: Your next dose is: Take 1 Tab by mouth nightly. 20 mg CALCIUM-VITAMIN D PO Your last dose was: Your next dose is: Take 1,200 mg by mouth daily. 1200 mg  
    
   
   
   
  
 chlorproMAZINE 50 mg tablet Commonly known as:  THORAZINE Your last dose was: Your next dose is: Take 25 mg by mouth two (2) times a day. 25 mg  
    
   
   
   
  
 COLACE 100 mg capsule Generic drug:  docusate sodium Your last dose was: Your next dose is: Take 100 mg by mouth two (2) times a day. 100 mg  
    
   
   
   
  
 famotidine 20 mg tablet Commonly known as:  PEPCID Your last dose was: Your next dose is: Take 20 mg by mouth two (2) times a day. 20 mg  
    
   
   
   
  
 folic acid 1 mg tablet Commonly known as:  [employer] Your last dose was: Your next dose is: Take 1 mg by mouth daily. 1 mg  
    
   
   
   
  
 gabapentin 100 mg capsule Commonly known as:  NEURONTIN Your last dose was: Your next dose is: Take 100 mg by mouth three (3) times daily. 100 mg  
    
   
   
   
  
 loratadine 10 mg tablet Commonly known as:  Lori Elena Your last dose was: Your next dose is: Take 10 mg by mouth daily. 10 mg Omega-3 Fatty Acids-Fish Oil 360-1,200 mg Cpdr  
   
Your last dose was: Your next dose is: Take 1,200 mg by mouth daily. 1200 mg  
    
   
   
   
  
 ondansetron hcl 4 mg tablet Commonly known as:  Sammie Garcia Your last dose was: Your next dose is: Take 1 Tab by mouth every eight (8) hours as needed for Nausea. 4 mg REMERON 15 mg tablet Generic drug:  mirtazapine Your last dose was: Your next dose is: Take 15 mg by mouth nightly. 15 mg  
    
   
   
   
  
 SORE THROAT (PHENOL) 1.4 % spray Generic drug:  phenol throat spray Your last dose was: Your next dose is: Take 1 Spray by mouth as needed for Sore throat or pain/local anesthesia. 1 Bond VITAMIN D3 2,000 unit Tab Generic drug:  cholecalciferol (vitamin D3) Your last dose was: Your next dose is: Take 2,000 Int'l Units by mouth daily. 2000 Int'l Units * Notice: This list has 2 medication(s) that are the same as other medications prescribed for you. Read the directions carefully, and ask your doctor or other care provider to review them with you. STOP taking these medications   
 oxyCODONE-acetaminophen 5-325 mg per tablet Commonly known as:  PERCOCET  
   
  
 ZOLOFT 100 mg tablet Generic drug:  sertraline Where to Get Your Medications Information on where to get these meds will be given to you by the nurse or doctor. ! Ask your nurse or doctor about these medications  
  levETIRAcetam 750 mg tablet Discharge Instructions DISCHARGE SUMMARY from Nurse PATIENT INSTRUCTIONS: 
 
What to do at Home: 
Recommended activity: Activity as tolerated and no driving for today. If you experience any of the following symptoms listed in your \"When Should You Call For Help? \" section of your discharge instructions, please follow up with your primary care doctor and/or call 911. *  Please give a list of your current medications to your Primary Care Provider. *  Please update this list whenever your medications are discontinued, doses are 
    changed, or new medications (including over-the-counter products) are added. *  Please carry medication information at all times in case of emergency situations. These are general instructions for a healthy lifestyle: No smoking/ No tobacco products/ Avoid exposure to second hand smoke Surgeon General's Warning:  Quitting smoking now greatly reduces serious risk to your health. Obesity, smoking, and sedentary lifestyle greatly increases your risk for illness A healthy diet, regular physical exercise & weight monitoring are important for maintaining a healthy lifestyle You may be retaining fluid if you have a history of heart failure or if you experience any of the following symptoms:  Weight gain of 3 pounds or more overnight or 5 pounds in a week, increased swelling in our hands or feet or shortness of breath while lying flat in bed. Please call your doctor as soon as you notice any of these symptoms; do not wait until your next office visit. Recognize signs and symptoms of STROKE: 
 
F-face looks uneven A-arms unable to move or move unevenly S-speech slurred or non-existent T-time-call 911 as soon as signs and symptoms begin-DO NOT go Back to bed or wait to see if you get better-TIME IS BRAIN. Warning Signs of HEART ATTACK Call 911 if you have these symptoms: 
? Chest discomfort. Most heart attacks involve discomfort in the center of the chest that lasts more than a few minutes, or that goes away and comes back. It can feel like uncomfortable pressure, squeezing, fullness, or pain. ? Discomfort in other areas of the upper body. Symptoms can include pain or discomfort in one or both arms, the back, neck, jaw, or stomach. ? Shortness of breath with or without chest discomfort. ? Other signs may include breaking out in a cold sweat, nausea, or lightheadedness. Don't wait more than five minutes to call [address]! Fast action can save your life. Calling 911 is almost always the fastest way to get lifesaving treatment. Emergency Medical Services staff can begin treatment when they arrive  up to an hour sooner than if someone gets to the hospital by car. The discharge information has been reviewed with the patient and spouse. The patient and spouse verbalized understanding. Discharge medications reviewed with the patient and spouse and appropriate educational materials and side effects teaching were provided.  
___________________________________________________________________________ ________________________________________________________ Patient armband removed and shredded. Introducing [facility]! [employer] introduces Populr patient portal. Now you can access parts of your medical record, email your doctor's office, and request medication refills online. 1. In your internet browser, go to [email or web address] 2. Click on the First Time User? Click Here link in the Sign In box. You will see the New Member Sign Up page. 3. Enter your Populr Access Code exactly as it appears below. You will not need to use this code after youve completed the sign-up process. If you do not sign up before the expiration date, you must request a new code. · Populr Access Code: [email or web address] Expires: 9/26/2018  7:54 AM 
 
4. Enter the last four digits of your Social Security Number (xxxx) and Date of Birth (mm/dd/yyyy) as indicated and click Submit. You will be taken to the next sign-up page. 5. Create a Populr ID. This will be your Populr login ID and cannot be changed, so think of one that is secure and easy to remember. 6. Create a Populr password. You can change your password at any time. 7. Enter your Password Reset Question and Answer. This can be used at a later time if you forget your password. 8. Enter your e-mail address. You will receive e-mail notification when new information is available in [address] 9. Click Sign Up. You can now view and download portions of your medical record. 10. Click the Download Summary menu link to download a portable copy of your medical information. If you have questions, please visit the Frequently Asked Questions section of the Populr website. Remember, Populr is NOT to be used for urgent needs. For medical emergencies, dial 911. Now available from your iPhone and Android! Introducing Jayme Carpio As a [employer] patient, I wanted to make you aware of our electronic visit tool called Jayme AdsItyogeshfin. BiTaksi 24/7 allows you to connect within minutes with a medical provider 24 hours a day, seven days a week via a mobile device or tablet or logging into a secure website from your computer. You can access Aplicor from anywhere in the [place]. A virtual visit might be right for you when you have a simple condition and feel like you just dont want to get out of bed, or cant get away from work for an appointment, when your regular [facility] provider is not available (evenings, weekends or holidays), or when youre out of town and need minor care. Electronic visits cost only $49 and if the BiTaksi 24/7 provider determines a prescription is needed to treat your condition, one can be electronically transmitted to a nearby pharmacy*. Please take a moment to enroll today if you have not already done so. The enrollment process is free and takes just a few minutes. To enroll, please download the BiTaksi 24/7 ruben to your tablet or phone, or visit [email or web address]."[email or web address][email or web address] org to enroll on your computer. And, as an [address] [place] [address] patient with a [employer] account, the results of your visits will be scanned into your electronic medical record and your primary care provider will be able to view the scanned results. We urge you to continue to see your regular [facility] provider for your ongoing medical care. And while your primary care provider may not be the one available when you seek a Habeasyogeshfin virtual visit, the peace of mind you get from getting a real diagnosis real time can be priceless. For more information on Aplicor, view our Frequently Asked Questions (FAQs) at [email or web address]."[email or web address][email or web address] org. Sincerely, 
 
Wilfred Gray MD 
Chief Medical Officer Caitlin8 Araceli Knott *:  certain medications cannot be prescribed via Aplicor Unresulted Labs-Please follow up with your PCP about these lab tests Order Current Status CULTURE, BLOOD Preliminary result CULTURE, BLOOD Preliminary result Providers Seen During Your Hospitalization Provider Specialty Primary office phone Irina Lizama DO Emergency Medicine [phone] Norman Smith MD Internal Medicine [phone] Your Primary Care Physician (PCP) Primary Care Physician Office Phone Office Fax Roseanna Worthington [phone] You are allergic to the following Allergen Reactions Aspirin Swelling Penicillins Unknown (comments) Recent Documentation Height Weight BMI OB Status Smoking Status 1.676 m 74.7 kg 26.57 kg/m2 Menopause Current Every Day Smoker Emergency Contacts Name Discharge Info Relation Home Work Mobile [address]  Spouse [3] [phone] Patient Belongings The following personal items are in your possession at time of discharge: 
     Visual Aid: None Discharge Instructions Attachments/References EPILEPSY (ENGLISH) LEVETIRACETAM (BY MOUTH) (ENGLISH) Patient Handouts Epilepsy: Care Instructions Your Care Instructions Epilepsy is a common condition that causes repeated seizures. The seizures are caused by bursts of electrical activity in the brain that aren't normal. Seizures may cause problems with muscle control, movement, speech, vision, or awareness. They can be scary. Epilepsy affects each person differently. Some people have only a few seizures. Others get them more often. If you know what triggers a seizure, you may be able to avoid having one. You can take medicines to control and reduce seizures. You and your doctor will need to find the right combination, schedule, and dose of medicine. This may take time and careful changes. Seizures may get worse and happen more often over time. Follow-up care is a key part of your treatment and safety. Be sure to make and go to all appointments, and call your doctor if you are having problems. It's also a good idea to know your test results and keep a list of the medicines you take. How can you care for yourself at home? · Be safe with medicines. Take your medicines exactly as prescribed. Call your doctor if you think you are having a problem with your medicine. · Make a treatment plan with your doctor. Be sure to follow your plan. · Try to identify and avoid things that may make you more likely to have a seizure. These may include: ¨ Not getting enough sleep. ¨ Using drugs or alcohol. ¨ Being emotionally stressed. ¨ Skipping meals. · Keep a record of any seizures you have. Note the date, time of day, and any details about the seizure that you can remember. Your doctor can use this information to plan or adjust your medicine or other treatment. · Be sure that any doctor treating you for another condition knows that you have epilepsy. Each doctor should know what medicines you are taking, if any. · Wear a medical ID bracelet. You can buy this at most [employer]. If you have a seizure that leaves you unconscious or unable to speak for yourself, this bracelet will let those who are treating you know that you have epilepsy. · Talk to your doctor about whether it is safe for you to do certain activities, such as drive or swim. When should you call for help? Call 911 anytime you think you may need emergency care. For example, call if: 
? · A seizure does not stop as it normally does. ? · You have new symptoms such as: 
¨ Numbness, tingling, or weakness on one side of your body or face. ¨ Vision changes. ¨ Trouble speaking or thinking clearly. ?Call your doctor now or seek immediate medical care if: 
? · You have a fever. ? · You have a severe headache. ? Watch closely for changes in your health, and be sure to contact your doctor if: ? · The normal pattern or features of your seizures change. Where can you learn more? Go to http://lefty-олег.info/. Nicholas Dior in the search box to learn more about \"Epilepsy: Care Instructions. \" Current as of: October 14, 2016 Content Version: 11.4 © 3365-6525 [employer]. Care instructions adapted under license by [employer] (which disclaims liability or warranty for this information). If you have questions about a medical condition or this instruction, always ask your healthcare professional. [address] any warranty or liability for your use of this information. Levetiracetam (By mouth) Levetiracetam ([id number]) Treats seizures. Brand Name(s): Keppra, Keppra XR, Roweepra, Spritam  
There may be other brand names for this medicine. When This Medicine Should Not Be Used: This medicine is not right for everyone. Do not use it if you had an allergic reaction to levetiracetam. 
How to Use This Medicine:  
Liquid, Tablet, Tablet for Suspension, Long Acting Tablet · Take your medicine as directed. Your dose may need to be changed several times to find what works best for you. Take your medicine at the same time each day. · Regular-release or extended-release tablet: Swallow whole. Do not break, crush, or chew it. · Spritam® dissolving tablet:  Make sure your hands are dry before you handle the tablet. Do not open the blister pack until you are ready to take a tablet. Peel back the foil and remove the tablet. Do not push the tablet through the foil. ¨ To dissolve the tablet in your mouth, place the tablet on your tongue and take a sip of water. After the tablet has melted, swallow. Do not swallow the tablet whole.  
¨ To dissolve the tablet in liquid so you can drink it, put a small amount of liquid (1 tablespoon or enough to cover the medicine) into a cup and add the tablet. Swirl the liquid gently so the tablet dissolves. After the tablet has dissolved, swallow this mixture right away . Then add a small amount of liquid to the cup again, swirl gently, and swallow this liquid so you get the full amount of medicine. · Measure the oral liquid medicine with a marked measuring spoon, oral syringe, or medicine cup. · This medicine should come with a Medication Guide. Ask your pharmacist for a copy if you do not have one. · Missed dose: Take a dose as soon as you remember. If it is almost time for your next dose, wait until then and take a regular dose. Do not take extra medicine to make up for a missed dose. · Store the medicine in a closed container at room temperature, away from heat, moisture, and direct light. Drugs and Foods to Avoid: Ask your doctor or pharmacist before using any other medicine, including over-the-counter medicines, vitamins, and herbal products. Warnings While Using This Medicine: · Tell your doctor if you are pregnant or breastfeeding, or if you have kidney disease or high blood pressure. · This medicine may cause the following problems: 
¨ Decreased numbers of blood cells, including anemia ¨ Serious skin reactions ¨ High blood pressure · This medicine may cause depression, thoughts of suicide, or changes in mood or behavior. Tell your doctor if you have a history of depression or mental health problems. · This medicine may make you dizzy, drowsy, or clumsy. Do not drive or do anything that could be dangerous until you know how this medicine affects you. · Do not stop using this medicine suddenly. Your doctor will need to slowly decrease your dose before you stop it completely. Your seizures may return or occur more often if you stop using this medicine suddenly. · Tell any doctor or dentist who treats you that you are using this medicine. This medicine may affect certain medical test results. · Your doctor will check your progress and the effects of this medicine at regular visits. Keep all appointments. · Keep all medicine out of the reach of children. Never share your medicine with anyone. Possible Side Effects While Using This Medicine:  
Call your doctor right away if you notice any of these side effects: · Allergic reaction: Itching or hives, swelling in your face or hands, swelling or tingling in your mouth or throat, chest tightness, trouble breathing · Blistering, peeling, red skin rash · Extreme sleepiness, tiredness, or weakness · Fever, chills, cough, sore throat, body aches · Problems with balance, coordination, or walking · Unusual changes in mood or behavior, depression, thoughts of hurting yourself · Unusual bleeding or bruising If you notice these less serious side effects, talk with your doctor: · Decreased appetite, diarrhea, nausea, vomiting · Headache, dizziness · Stuffy or runny nose If you notice other side effects that you think are caused by this medicine, tell your doctor. Call your doctor for medical advice about side effects. You may report side effects to FDA at 5-152-FDA-6574 © 2017 [address] Information is for End User's use only and may not be sold, redistributed or otherwise used for commercial purposes. The above information is an  only. It is not intended as medical advice for individual conditions or treatments. Talk to your doctor, nurse or pharmacist before following any medical regimen to see if it is safe and effective for you. Please provide this summary of care documentation to your next provider. Signatures-by signing, you are acknowledging that this After Visit Summary has been reviewed with you and you have received a copy. Patient Signature:  ____________________________________________________________ Date:  ____________________________________________________________  
  
Joyce Feller Provider Signature:  ____________________________________________________________ Date:  ____________________________________________________________

## 2018-06-28 NOTE — PROGRESS NOTES
attempted to  complete the initial Spiritual Assessment of the patient in bed 7 of the emergency room and offer Pastoral Care. support, but found patient resting after having a seizure this afternoon. Family members were at bedside. Patient does not have any Anabaptist/cultural needs that will affect patients preferences in health care. Chaplains will continue to follow and will provide pastoral care on an as needed/requested basis.     [facility] Care Department  [phone]

## 2018-06-28 NOTE — PROGRESS NOTES
1830-Received from ER & positioned comfortably in bed - placed on monitor - VSS - Sitter at [facility] - pt somnolent but arousable -no seizure activity - Will follow simple commands but does not state her name when asked or no where she is - pupils 2 & sluggish - Lungs diminished but clear - placed on O2NC -Able to move all extremities - no skin break down noted & mepilex applied

## 2018-06-28 NOTE — PROGRESS NOTES
Pharmacy Dosing Services: Vancomycin    Indication: Sepsis    Day of therapy: 1    Other Antimicrobials (Include dose, start day & day of therapy):  Levofloxacin 750 mg IV every 24 hours (started )       Loading dose (date given): 1500 mg  Current Maintenance dose: none at this time    Goal Vancomycin Level: 15-20  (Trough 15-20 for most infections, 20 for meningitis/osteomyelitis, pre-HD level ~25)    Vancomycin Level (if drawn): none at this time     Significant Cultures: pending    Renal function stable? (unstable defined as SCr increase of 0.5 mg/dL or > 50% increase from baseline, whichever is greater) (Y/N): Y     CAPD, Hemodialysis or Renal Replacement Therapy (Y/N): N     Recent Labs      18   0925  18   0830   CREA  0.77   --    BUN  12   --    WBC   --   6.1     Temp (24hrs), Av.4 °F (38 °C), Min:100.4 °F (38 °C), Max:100.4 °F (38 °C)    Creatinine Clearance (Creatinine Clearance (ml/min)): 70 ml/min     Regimen assessment:   - New consult to dose vancomycin + levofloxacin     Maintenance dose: 1000 mg IV every 12 hours    Next scheduled level:  at . Rosa Vicente will follow daily and adjust medications as appropriate for renal function and/or serum levels.     Thank you,  Grace Vidales, PHARMD

## 2018-06-28 NOTE — ED PROVIDER NOTES
EMERGENCY DEPARTMENT HISTORY AND PHYSICAL EXAM    7:57 AM      Date: 6/28/2018  Patient Name: Luis Amaro    History of Presenting Illness     No chief complaint on file. HPI limited due to patient being unresponsive    History Provided By: EMS    Chief Complaint: Seizure  Duration:  04:00 today  Timing:  N/A  Location: N/A  Quality: N/A  Severity: N/A  Modifying Factors: Patient given 5mg Versed IN and 2mg Ativan IV with improvement  Associated Symptoms: N/A      Additional History (Context): Luis Amaro is a 60 y.o. female with a history of epilepsy who presents with a seizure that began at 04:00 today. EMS reports that the patient's last seizure was approximately 1 year ago per family. EMS gave the patient 5mg Versed IN and 2mg Ativan IV with improvement. EMS reports the patient is on [address] per . PCP: Freddie Desai MD    Current Facility-Administered Medications   Medication Dose Route Frequency Provider Last Rate Last Dose    sodium chloride (NS) flush 5-10 mL  5-10 mL IntraVENous PRN Canary Finders, DO        levoFLOXacin (LEVAQUIN) 750 mg in D5W IVPB  750 mg IntraVENous Q24H Canary Finders, DO   Stopped at 06/28/18 1052    vancomycin (VANCOCIN) 1,000 mg in 0.9% sodium chloride (MBP/ADV) 250 mL adv  1,000 mg IntraVENous Q12H Canary Finders, DO        [START ON 6/30/2018] VANCOMYCIN INFORMATION NOTE   Other ONCE Canary Finders, DO        VANCOMYCIN INFORMATION NOTE   Other Rx Dosing/Monitoring Canary Finders, DO         Current Outpatient Prescriptions   Medication Sig Dispense Refill    mirtazapine (REMERON) 15 mg tablet Take 15 mg by mouth nightly.  cholecalciferol, vitamin D3, (VITAMIN D3) 2,000 unit tab Take 2,000 Int'l Units by mouth daily.  docusate sodium (COLACE) 100 mg capsule Take 100 mg by mouth two (2) times a day.  adalimumab 40 mg/0.8 mL pnkt 40 mg by SubCUTAneous route Once every 2 weeks.  Indications: RHEUMATOID ARTHRITIS      Omega-3 Fatty Acids-Fish Oil 360-1,200 mg cpDR Take 1,200 mg by mouth daily.  CALCIUM CARB/VIT D3/MINERALS (CALCIUM-VITAMIN D PO) Take 1,200 mg by mouth daily.  gabapentin (NEURONTIN) 100 mg capsule Take 100 mg by mouth three (3) times daily.  chlorproMAZINE (THORAZINE) 50 mg tablet Take 25 mg by mouth two (2) times a day.  venlafaxine-SR (EFFEXOR XR) 75 mg capsule Take 150 mg by mouth daily.  venlafaxine-SR (EFFEXOR-XR) 150 mg capsule Take 150 mg by mouth daily.  atorvastatin (LIPITOR) 20 mg tablet Take 20 mg by mouth daily.  OLANZapine (ZYPREXA) 5 mg tablet Take 15 mg by mouth nightly.  levETIRAcetam (KEPPRA) 750 mg tablet Take 2 Tabs by mouth two (2) times a day. 120 Tab 0    amLODIPine (NORVASC) 5 mg tablet Take 5 mg by mouth daily.  phenol throat spray (SORE THROAT, PHENOL,) 1.4 % spray Take 1 Spray by mouth as needed for Sore throat or pain/local anesthesia.  levETIRAcetam 1,000 mg tablet Take 1,000 mg by mouth two (2) times a day. TAKING 1.5 TAB BID TOTAL 1500MG      oxyCODONE-acetaminophen (PERCOCET) 5-325 mg per tablet Take 1 tablet every 4-6 hours as needed for pain control. If you were instructed to try over the counter ibuprofen or tylenol, only take the percocet for pain not controlled with the over the counter medication. 10 Tab 0    atorvastatin (LIPITOR) 20 mg tablet Take 1 Tab by mouth nightly. 30 Tab 0    ondansetron hcl (ZOFRAN, AS HYDROCHLORIDE,) 4 mg tablet Take 1 Tab by mouth every eight (8) hours as needed for Nausea. 30 Tab 0    loratadine (CLARITIN) 10 mg tablet Take 10 mg by mouth daily.  famotidine (PEPCID) 20 mg tablet Take 20 mg by mouth two (2) times a day.  sertraline (ZOLOFT) 100 mg tablet Take 100 mg by mouth daily.  folic acid (FOLVITE) 1 mg tablet Take 1 mg by mouth daily.          Past History     Past Medical History:  Past Medical History:   Diagnosis Date    Diabetes     Dyslipidemia     Hypertension     Noncompliance     Polysubstance abuse     ETOH,  marijuana    Psychosis     Pulmonary hypertension     RVSP 60mmHg (ECHO 6/15)    Seizure disorder        Past Surgical History:  History reviewed. No pertinent surgical history. Family History:  History reviewed. No pertinent family history. Social History:  Social History   Substance Use Topics    Smoking status: Current Every Day Smoker     Packs/day: 0.25    Smokeless tobacco: None    Alcohol use Yes      Comment: occasional beer       Allergies: Allergies   Allergen Reactions    Aspirin Swelling    Penicillins Unknown (comments)         Review of Systems       Review of Systems   Unable to perform ROS: Patient unresponsive   Neurological: Positive for seizures. Physical Exam     Visit Vitals    /73    Pulse 85    Temp 100.4 °F (38 °C)    Resp 20    Ht 5' 6\" (1.676 m)    Wt 70.8 kg (156 lb)    SpO2 96%    BMI 25.18 kg/m2         Physical Exam   Constitutional: She appears well-developed and well-nourished. HENT:   Head: Normocephalic and atraumatic. Eyes: Pupils are equal, round, and reactive to light. Neck: Neck supple. No JVD present. Cardiovascular: Regular rhythm. Tachycardia present. Pulmonary/Chest: Effort normal. No respiratory distress. Coarse rhonchi   Abdominal: Soft. She exhibits no distension. There is no tenderness. There is no rebound and no guarding. Musculoskeletal: She exhibits no edema. Neurological:   Following intermittent commands, moving all extremities  Non-verbal   Skin: Skin is warm. No rash noted. No erythema.    diaphoretic         Diagnostic Study Results     Labs -  Recent Results (from the past 12 hour(s))   EKG, 12 LEAD, INITIAL    Collection Time: 06/28/18  8:10 AM   Result Value Ref Range    Ventricular Rate 104 BPM    Atrial Rate 104 BPM    P-R Interval 116 ms    QRS Duration 74 ms    Q-T Interval 352 ms    QTC Calculation (Bezet) 462 ms    Calculated P Axis 58 degrees Calculated R Axis -16 degrees    Calculated T Axis 34 degrees    Diagnosis       Sinus tachycardia  Septal infarct , age undetermined  Abnormal ECG     POC LACTIC ACID    Collection Time: 06/28/18  8:24 AM   Result Value Ref Range    Lactic Acid (POC) 2.4 (HH) 0.4 - 2.0 mmol/L   CBC WITH AUTOMATED DIFF    Collection Time: 06/28/18  8:30 AM   Result Value Ref Range    WBC 6.1 4.6 - 13.2 K/uL    RBC 3.92 (L) 4.20 - 5.30 M/uL    HGB 12.9 12.0 - 16.0 g/dL    HCT 38.4 35.0 - 45.0 %    MCV 98.0 (H) 74.0 - 97.0 FL    MCH 32.9 24.0 - 34.0 PG    MCHC 33.6 31.0 - 37.0 g/dL    RDW 14.1 11.6 - 14.5 %    PLATELET 230 (L) 227 - 420 K/uL    MPV 10.3 9.2 - 11.8 FL    NEUTROPHILS 72 40 - 73 %    LYMPHOCYTES 21 21 - 52 %    MONOCYTES 6 3 - 10 %    EOSINOPHILS 1 0 - 5 %    BASOPHILS 0 0 - 2 %    ABS. NEUTROPHILS 4.4 1.8 - 8.0 K/UL    ABS. LYMPHOCYTES 1.3 0.9 - 3.6 K/UL    ABS. MONOCYTES 0.4 0.05 - 1.2 K/UL    ABS. EOSINOPHILS 0.1 0.0 - 0.4 K/UL    ABS. BASOPHILS 0.0 0.0 - 0.06 K/UL    DF AUTOMATED     MAGNESIUM    Collection Time: 06/28/18  9:25 AM   Result Value Ref Range    Magnesium 1.8 1.6 - 2.6 mg/dL   METABOLIC PANEL, COMPREHENSIVE    Collection Time: 06/28/18  9:25 AM   Result Value Ref Range    Sodium 141 136 - 145 mmol/L    Potassium 4.2 3.5 - 5.5 mmol/L    Chloride 108 100 - 108 mmol/L    CO2 25 21 - 32 mmol/L    Anion gap 8 3.0 - 18 mmol/L    Glucose 123 (H) 74 - 99 mg/dL    BUN 12 7.0 - 18 MG/DL    Creatinine 0.77 0.6 - 1.3 MG/DL    BUN/Creatinine ratio 16 12 - 20      GFR est AA >60 >60 ml/min/1.73m2    GFR est non-AA >60 >60 ml/min/1.73m2    Calcium 7.7 (L) 8.5 - 10.1 MG/DL    Bilirubin, total 0.2 0.2 - 1.0 MG/DL    ALT (SGPT) 97 (H) 13 - 56 U/L    AST (SGOT) 124 (H) 15 - 37 U/L    Alk.  phosphatase 117 45 - 117 U/L    Protein, total 7.8 6.4 - 8.2 g/dL    Albumin 3.1 (L) 3.4 - 5.0 g/dL    Globulin 4.7 (H) 2.0 - 4.0 g/dL    A-G Ratio 0.7 (L) 0.8 - 1.7     URINALYSIS W/ RFLX MICROSCOPIC    Collection Time: 06/28/18 11:30 AM Result Value Ref Range    Color YELLOW      Appearance CLEAR      Specific gravity 1.009 1.005 - 1.030      pH (UA) 7.5 5.0 - 8.0      Protein NEGATIVE  NEG mg/dL    Glucose NEGATIVE  NEG mg/dL    Ketone NEGATIVE  NEG mg/dL    Bilirubin NEGATIVE  NEG      Blood NEGATIVE  NEG      Urobilinogen 0.2 0.2 - 1.0 EU/dL    Nitrites NEGATIVE  NEG      Leukocyte Esterase NEGATIVE  NEG     POC LACTIC ACID    Collection Time: 06/28/18 12:37 PM   Result Value Ref Range    Lactic Acid (POC) 2.1 (HH) 0.4 - 2.0 mmol/L       Radiologic Studies -   CT HEAD WO CONT   Final Result      XR CHEST PORT   Final Result      No significant abnormalities. 1. No evidence of acute infarct, hemorrhage or mass. Stable mild cerebral  atrophy. Medical Decision Making   I am the first provider for this patient. I reviewed the vital signs, available nursing notes, past medical history, past surgical history, family history and social history. Vital Signs-Reviewed the patient's vital signs. Pulse Oximetry Analysis -  97 on room air (Interpretation)nl     Cardiac Monitor:  Rate: 111  Rhythm:  Sinus Tachycardia     EKG: Interpreted by the EP. Time Interpreted: 813   Rate: 106   Rhythm: Sinus Tachycardia    Interpretation: left axis, sinus tach, poor baseline, no ST changes   Comparison: unchanged from 5/1/17    Records Reviewed: Nursing Notes and Old Medical Records (Time of Review: 7:57 AM)    ED Course: Progress Notes, Reevaluation, and Consults:    8:04 AM Nurse noted temperature of 100.4. Sepsis bundle initiated at this time. 1:00 PM Discussed results with patient's  and she is still not at her baseline status. Will consult neurology for likely admission. 1:09 PM Consult: I discussed care with Dr. Tonya Jaramillo (Tele-neurology). It was a standard discussion including patient history, chief complaint, available diagnostic results, and predicted treatment course. Will see the patient.     005 PM. Dr. Tonya Jaramillo recommends admission for status epilepticus. 3:25 PM Consult: I discussed care with Dr. Juventino Maya (Hospitalist). It was a standard discussion including patient history, chief complaint, available diagnostic results, and predicted treatment course. Accepts the patient for admission. Provider Notes (Medical Decision Making): 59 y/o female presents with seizure. Does have hx, will screen for infection, basic labs. Plan for ct as last sz 1 yr ago. Will give keppra load. Noted elevated lactate, however this is expected after seizure. Will give NS bolus, no hypotension,       Pt more awake but agitate, confused, so given additional ativan. Pt still not at baseline mental status, concern for status so will admit. Pt started on empiric abx, however no source of infection, well prior to seziure so meningitis seems unlikely. For Hospitalized Patients:    1. Hospitalization Decision Time:  The decision to hospitalize the patient was made by Dr. Mary Kauffman at 318pm on 6/28/2018    2. Aspirin: Aspirin was not given because the patient did not present with a stroke at the time of their Emergency Department evaluation    Diagnosis     Clinical Impression:   1. Status epilepticus ([address]        Disposition: admit        Attestations:  [address] Saldivar acting as a scribe for and in the presence of Trinidad Willingham DO      June 28, 2018 at 3:23 PM       Provider Attestation:      I personally performed the services described in the documentation, reviewed the documentation, as recorded by the scribe in my presence, and it accurately and completely records my words and actions.  June 28, 2018 at 3:23 PM - Trinidad Willingham, DO    _______________________________

## 2018-06-29 LAB
AMPHET UR QL SCN: NEGATIVE
ANION GAP SERPL CALC-SCNC: 8 MMOL/L (ref 3–18)
BARBITURATES UR QL SCN: NEGATIVE
BENZODIAZ UR QL: POSITIVE
BUN SERPL-MCNC: 8 MG/DL (ref 7–18)
BUN/CREAT SERPL: 12 (ref 12–20)
CALCIUM SERPL-MCNC: 8.2 MG/DL (ref 8.5–10.1)
CANNABINOIDS UR QL SCN: POSITIVE
CHLORIDE SERPL-SCNC: 107 MMOL/L (ref 100–108)
CO2 SERPL-SCNC: 24 MMOL/L (ref 21–32)
COCAINE UR QL SCN: NEGATIVE
CREAT SERPL-MCNC: 0.69 MG/DL (ref 0.6–1.3)
ERYTHROCYTE [DISTWIDTH] IN BLOOD BY AUTOMATED COUNT: 14.1 % (ref 11.6–14.5)
FOLATE SERPL-MCNC: 17.5 NG/ML (ref 3.1–17.5)
GLUCOSE BLD STRIP.AUTO-MCNC: 109 MG/DL (ref 70–110)
GLUCOSE BLD STRIP.AUTO-MCNC: 110 MG/DL (ref 70–110)
GLUCOSE BLD STRIP.AUTO-MCNC: 116 MG/DL (ref 70–110)
GLUCOSE BLD STRIP.AUTO-MCNC: 122 MG/DL (ref 70–110)
GLUCOSE SERPL-MCNC: 86 MG/DL (ref 74–99)
HCT VFR BLD AUTO: 35.7 % (ref 35–45)
HDSCOM,HDSCOM: ABNORMAL
HGB BLD-MCNC: 12 G/DL (ref 12–16)
MCH RBC QN AUTO: 33.7 PG (ref 24–34)
MCHC RBC AUTO-ENTMCNC: 33.6 G/DL (ref 31–37)
MCV RBC AUTO: 100.3 FL (ref 74–97)
METHADONE UR QL: NEGATIVE
OPIATES UR QL: NEGATIVE
PCP UR QL: NEGATIVE
PLATELET # BLD AUTO: 126 K/UL (ref 135–420)
PMV BLD AUTO: 10.4 FL (ref 9.2–11.8)
POTASSIUM SERPL-SCNC: 3.5 MMOL/L (ref 3.5–5.5)
RBC # BLD AUTO: 3.56 M/UL (ref 4.2–5.3)
SODIUM SERPL-SCNC: 139 MMOL/L (ref 136–145)
VIT B12 SERPL-MCNC: 578 PG/ML (ref 211–911)
WBC # BLD AUTO: 4.5 K/UL (ref 4.6–13.2)

## 2018-06-29 PROCEDURE — 74011000258 HC RX REV CODE- 258: Performed by: HOSPITALIST

## 2018-06-29 PROCEDURE — 74011250636 HC RX REV CODE- 250/636: Performed by: NURSE PRACTITIONER

## 2018-06-29 PROCEDURE — 82962 GLUCOSE BLOOD TEST: CPT

## 2018-06-29 PROCEDURE — 74011250637 HC RX REV CODE- 250/637: Performed by: NURSE PRACTITIONER

## 2018-06-29 PROCEDURE — 85027 COMPLETE CBC AUTOMATED: CPT | Performed by: HOSPITALIST

## 2018-06-29 PROCEDURE — 80307 DRUG TEST PRSMV CHEM ANLYZR: CPT | Performed by: NURSE PRACTITIONER

## 2018-06-29 PROCEDURE — 74011250636 HC RX REV CODE- 250/636: Performed by: HOSPITALIST

## 2018-06-29 PROCEDURE — 82607 VITAMIN B-12: CPT | Performed by: NURSE PRACTITIONER

## 2018-06-29 PROCEDURE — 74011250637 HC RX REV CODE- 250/637: Performed by: HOSPITALIST

## 2018-06-29 PROCEDURE — 80048 BASIC METABOLIC PNL TOTAL CA: CPT | Performed by: HOSPITALIST

## 2018-06-29 PROCEDURE — 65270000029 HC RM PRIVATE

## 2018-06-29 PROCEDURE — 36415 COLL VENOUS BLD VENIPUNCTURE: CPT | Performed by: HOSPITALIST

## 2018-06-29 RX ORDER — SERTRALINE HYDROCHLORIDE 50 MG/1
100 TABLET, FILM COATED ORAL DAILY
Status: DISCONTINUED | OUTPATIENT
Start: 2018-06-30 | End: 2018-06-29

## 2018-06-29 RX ORDER — OLANZAPINE 5 MG/1
15 TABLET ORAL
Status: DISCONTINUED | OUTPATIENT
Start: 2018-06-29 | End: 2018-06-30 | Stop reason: HOSPADM

## 2018-06-29 RX ORDER — DEXTROSE 50 % IN WATER (D50W) INTRAVENOUS SYRINGE
25-50 AS NEEDED
Status: DISCONTINUED | OUTPATIENT
Start: 2018-06-29 | End: 2018-06-30 | Stop reason: HOSPADM

## 2018-06-29 RX ORDER — LORAZEPAM 2 MG/ML
1 INJECTION INTRAMUSCULAR AS NEEDED
Status: DISCONTINUED | OUTPATIENT
Start: 2018-06-29 | End: 2018-06-30 | Stop reason: HOSPADM

## 2018-06-29 RX ORDER — VENLAFAXINE HYDROCHLORIDE 150 MG/1
150 CAPSULE, EXTENDED RELEASE ORAL DAILY
Status: DISCONTINUED | OUTPATIENT
Start: 2018-06-30 | End: 2018-06-29

## 2018-06-29 RX ORDER — MIRTAZAPINE 15 MG/1
15 TABLET, FILM COATED ORAL
Status: DISCONTINUED | OUTPATIENT
Start: 2018-06-29 | End: 2018-06-30 | Stop reason: HOSPADM

## 2018-06-29 RX ORDER — VENLAFAXINE 75 MG/1
75 TABLET ORAL DAILY
COMMUNITY
End: 2020-12-17

## 2018-06-29 RX ORDER — GABAPENTIN 100 MG/1
100 CAPSULE ORAL 3 TIMES DAILY
Status: DISCONTINUED | OUTPATIENT
Start: 2018-06-29 | End: 2018-06-30 | Stop reason: HOSPADM

## 2018-06-29 RX ORDER — CHLORPROMAZINE HYDROCHLORIDE 25 MG/1
25 TABLET, FILM COATED ORAL 2 TIMES DAILY
Status: DISCONTINUED | OUTPATIENT
Start: 2018-06-29 | End: 2018-06-30 | Stop reason: HOSPADM

## 2018-06-29 RX ORDER — INSULIN LISPRO 100 [IU]/ML
INJECTION, SOLUTION INTRAVENOUS; SUBCUTANEOUS
Status: DISCONTINUED | OUTPATIENT
Start: 2018-06-29 | End: 2018-06-30 | Stop reason: HOSPADM

## 2018-06-29 RX ORDER — LEVETIRACETAM 15 MG/ML
1500 INJECTION INTRAVASCULAR EVERY 12 HOURS
Status: DISCONTINUED | OUTPATIENT
Start: 2018-06-29 | End: 2018-06-30 | Stop reason: HOSPADM

## 2018-06-29 RX ORDER — LORAZEPAM 2 MG/ML
1 INJECTION INTRAMUSCULAR ONCE
Status: DISCONTINUED | OUTPATIENT
Start: 2018-06-29 | End: 2018-06-29

## 2018-06-29 RX ORDER — OLANZAPINE 15 MG/1
15 TABLET ORAL
COMMUNITY
End: 2018-06-30

## 2018-06-29 RX ORDER — MAGNESIUM SULFATE 100 %
4 CRYSTALS MISCELLANEOUS AS NEEDED
Status: DISCONTINUED | OUTPATIENT
Start: 2018-06-29 | End: 2018-06-30 | Stop reason: HOSPADM

## 2018-06-29 RX ADMIN — LEVETIRACETAM 1000 MG: 10 INJECTION INTRAVENOUS at 09:31

## 2018-06-29 RX ADMIN — MIRTAZAPINE 15 MG: 15 TABLET, FILM COATED ORAL at 23:19

## 2018-06-29 RX ADMIN — GABAPENTIN 100 MG: 100 CAPSULE ORAL at 17:28

## 2018-06-29 RX ADMIN — FAMOTIDINE 20 MG: 20 TABLET ORAL at 17:28

## 2018-06-29 RX ADMIN — DEXTROSE MONOHYDRATE AND SODIUM CHLORIDE 75 ML/HR: 5; .45 INJECTION, SOLUTION INTRAVENOUS at 10:19

## 2018-06-29 RX ADMIN — DOCUSATE SODIUM 100 MG: 100 CAPSULE, LIQUID FILLED ORAL at 09:31

## 2018-06-29 RX ADMIN — LEVETIRACETAM 1500 MG: 15 INJECTION INTRAVENOUS at 17:29

## 2018-06-29 RX ADMIN — ENOXAPARIN SODIUM 40 MG: 100 INJECTION SUBCUTANEOUS at 23:19

## 2018-06-29 RX ADMIN — FAMOTIDINE 20 MG: 20 TABLET ORAL at 09:31

## 2018-06-29 RX ADMIN — LORAZEPAM 2 MG: 2 INJECTION, SOLUTION INTRAMUSCULAR; INTRAVENOUS at 14:49

## 2018-06-29 RX ADMIN — FOLIC ACID 1 MG: 1 TABLET ORAL at 09:31

## 2018-06-29 RX ADMIN — OLANZAPINE 15 MG: 5 TABLET, FILM COATED ORAL at 23:19

## 2018-06-29 RX ADMIN — ATORVASTATIN CALCIUM 20 MG: 20 TABLET, FILM COATED ORAL at 23:19

## 2018-06-29 RX ADMIN — CHLORPROMAZINE HYDROCHLORIDE 25 MG: 25 TABLET, SUGAR COATED ORAL at 17:28

## 2018-06-29 RX ADMIN — DOCUSATE SODIUM 100 MG: 100 CAPSULE, LIQUID FILLED ORAL at 17:28

## 2018-06-29 RX ADMIN — AMLODIPINE BESYLATE 5 MG: 5 TABLET ORAL at 09:31

## 2018-06-29 RX ADMIN — GABAPENTIN 100 MG: 100 CAPSULE ORAL at 23:19

## 2018-06-29 NOTE — PROGRESS NOTES
Problem: Pressure Injury - Risk of  Goal: *Prevention of pressure injury  Document Jerman Scale and appropriate interventions in the flowsheet.    Outcome: Progressing Towards Goal  Pressure Injury Interventions:       Moisture Interventions: Absorbent underpads    Activity Interventions: Pressure redistribution bed/mattress(bed type)    Mobility Interventions: Pressure redistribution bed/mattress (bed type)    Nutrition Interventions: Document food/fluid/supplement intake

## 2018-06-29 NOTE — PROGRESS NOTES
Problem: Falls - Risk of  Goal: *Absence of Falls  Document Cristal Fall Risk and appropriate interventions in the flowsheet. Outcome: Progressing Towards Goal  Fall Risk Interventions:  Mobility Interventions: Communicate number of staff needed for ambulation/transfer, Patient to call before getting OOB    Mentation Interventions: Door open when patient unattended    Medication Interventions: Patient to call before getting OOB    Elimination Interventions: Call light in reach, Patient to call for help with toileting needs    History of Falls Interventions: Door open when patient unattended, Bed/chair exit alarm        Problem: Pressure Injury - Risk of  Goal: *Prevention of pressure injury  Document Jerman Scale and appropriate interventions in the flowsheet.    Outcome: Progressing Towards Goal  Pressure Injury Interventions:       Moisture Interventions: Apply protective barrier, creams and emollients, Absorbent underpads    Activity Interventions: Pressure redistribution bed/mattress(bed type)    Mobility Interventions: HOB 30 degrees or less, Pressure redistribution bed/mattress (bed type)    Nutrition Interventions: Document food/fluid/supplement intake    Friction and Shear Interventions: HOB 30 degrees or less

## 2018-06-29 NOTE — PROGRESS NOTES
Reason for Admission:   Epilepsy with status epilepticus                   RRAT Score:     10                Plan for utilizing home health:     None at this time                     Likelihood of Readmission: mod                          Transition of Care Plan:    Home  Interviewed patient, she agrees to share her discharge information with her , Martell Campbell . Demographic information verified. She was moderately  independent with the use of a cane prior to admission and see Dr Elvia Felix. She states \"it been awhile\" since she has seen her PCP. She is uninsured, referral made to [address] [place] [address]. Her discharge plan is to return home. Care Management Interventions  PCP Verified by CM: Yes  Palliative Care Criteria Met (RRAT>21 & CHF Dx)?: No  Mode of Transport at Discharge: Other (see comment) ( to transport)  Transition of Care Consult (CM Consult):  Other  MyChart Signup: No  Discharge Durable Medical Equipment: No  Health Maintenance Reviewed: Yes  Physical Therapy Consult: No  Occupational Therapy Consult: No  Speech Therapy Consult: No  Current Support Network: Lives with Spouse  Confirm Follow Up Transport: Family  Plan discussed with Pt/Family/Caregiver: Yes   Resource Information Provided?: No  Discharge Location  Discharge Placement: Home

## 2018-06-29 NOTE — PROGRESS NOTES
Problem: Pressure Injury - Risk of  Goal: *Prevention of pressure injury  Document Jerman Scale and appropriate interventions in the flowsheet.    Outcome: Progressing Towards Goal  Pressure Injury Interventions:       Moisture Interventions: Absorbent underpads, Check for incontinence Q2 hours and as needed    Activity Interventions: Pressure redistribution bed/mattress(bed type)    Mobility Interventions: HOB 30 degrees or less, Pressure redistribution bed/mattress (bed type)    Nutrition Interventions: Document food/fluid/supplement intake

## 2018-06-29 NOTE — PROGRESS NOTES
Problem: Falls - Risk of  Goal: *Absence of Falls  Document Cristal Fall Risk and appropriate interventions in the flowsheet.    Outcome: Progressing Towards Goal  Fall Risk Interventions:  Mobility Interventions: Communicate number of staff needed for ambulation/transfer    Mentation Interventions: Door open when patient unattended, Family/sitter at bedside         Elimination Interventions: Patient to call for help with toileting needs

## 2018-06-29 NOTE — CONSULTS
Neurology Consult Note    Admit Date: 6/28/2018  Length of Stay: 1  Primary Care: Radha Su MD   Principle Problem: Epilepsy with status epilepticus [facility])     Assessment:    Seizure    Plan:    Unsure what was the attributing factor.  states she did not have that AM dose so possibly from a missed dose of her keppra. UDS ordered. Past testing has been positive for THC. Was febrile on admission but could be from seizure. Has been afebrile ever since. No leukocytosis, UA negative, and BC NGTD. EEG showed diffuse slowly and was performed during the post ictal phase. If she continues to be at her baseline, would allow her to return to home. Does not need her MRI. Have resumed her home psychiatric medication as current affect may be related missed doses. History: Alice Smart is 60yo AAF with PMH of DM, HLD, HTN, polysubstance abuse, psychosis, and seizure disorder who presented to the ED via EMS after having a seizure. She is unable to relate what occur and is a poor historian. Per  she started having a convulsive type of seizure around 0330 and called EMS around 4. She has had decreased appetite for the past 3 months and takes megace. She has had progressive memory loss for several years. He is the one who administers her medications and states she has not missed any doses. Results reviewed:     CT Results (most recent):    Results from [place] encounter on 06/28/18   CT HEAD WO CONT   Narrative EXAM: CT head    INDICATION: Seizures    COMPARISON: Noncontrast CT brain 5/1/2017    TECHNIQUE: Axial CT imaging of the head was performed without intravenous  contrast. Coronal and sagittal reconstructions were performed. One or more dose reduction techniques were used on this CT: automated exposure  control, adjustment of the mAs and/or kVp according to patient's size, and  iterative reconstruction techniques.  The specific techniques utilized on this CT  exam have been documented in the patient's electronic medical record.     _______________    FINDINGS:    BRAIN AND POSTERIOR FOSSA: There is some motion artifact however the study is  felt to be diagnostic. Ventricles are normal. Sulci are mildly prominent but  symmetric and stable. There is no intracranial hemorrhage, mass effect, or  midline shift. No evidence of acute cortical infarct. EXTRA-AXIAL SPACES AND MENINGES: There are no abnormal extra-axial fluid  collections. CALVARIUM: Intact. SINUSES: Clear. OTHER: Visualized orbital structures and mastoid air cells are unremarkable.    _______________         Impression IMPRESSION:      1. No evidence of acute infarct, hemorrhage or mass. Stable mild cerebral  atrophy. MRI Results (most recent):  No results found for this or any previous visit. Latest Hemoglobin A1C:  Lab Results   Component Value Date/Time    Hemoglobin A1c 4.6 06/28/2018 08:30 AM       Latest Cardiology Procedure:  Results for orders placed or performed during the hospital encounter of 06/28/18   EKG, 12 LEAD, INITIAL   Result Value Ref Range    Ventricular Rate 106 BPM    Atrial Rate 106 BPM    P-R Interval 116 ms    QRS Duration 74 ms    Q-T Interval 356 ms    QTC Calculation (Bezet) 472 ms    Calculated P Axis 51 degrees    Calculated R Axis -18 degrees    Calculated T Axis 20 degrees    Diagnosis       Sinus tachycardia  Septal infarct (cited on or before 07-MAY-2017)  Abnormal ECG  When compared with ECG of 07-MAY-2017 00:19,  Vent.  rate has increased BY  36 BPM    Confirmed by Tellis Ahr ([id number]) on 6/28/2018 8:49:13 PM         Important Labs:  No results found for: FOL, RBCF  Lab Results   Component Value Date/Time    Cholesterol, total 166 06/14/2015 08:06 PM    HDL Cholesterol 80 (H) 06/14/2015 08:06 PM    LDL, calculated 75.8 06/14/2015 08:06 PM    VLDL, calculated 10.2 06/14/2015 08:06 PM    Triglyceride 51 06/14/2015 08:06 PM CHOL/HDL Ratio 2.1 06/14/2015 08:06 PM     Lab Results   Component Value Date/Time    TSH 1.36 05/01/2017 04:00 PM     Lab Results   Component Value Date/Time    Phenobarbital <2.4 (L) 04/25/2015 10:10 AM    Phenytoin 1.2 (L) 03/02/2015 03:40 PM     Discussed with: nursing and patient's  via phone. Allergies:    Allergies   Allergen Reactions    Aspirin Swelling    Penicillins Unknown (comments)      Review of Systems: per    Y  N   Constitutional: [x] [] recent weight change  [] [x] fever  [] [] sleep difficulties   ENT/Mouth:  [] [] hearing loss  [] [x] swallowing problems  [] [] slurred speech   Cardiovascular:  [] [x] chest pain   [] [] palpitations    Respiratory: [] [x] cough with swallow  [] [x] shortness of breath  [] [] sleep apnea  [] [] intubated   Gastrointestinal: [] [x] abdominal pain  [] [x] nausea   Genitourinary: [] [] frequent urination  [] [] incontinence    Musculoskeletal:   [] [x] joint pain  [] [x] muscle pain   Integument:   [] [] rash/itching   Neurological:  [] [] dizziness/vertigo  [] [] sedation  [] [x] confusion  [] [] agitation/combativeness  [] [] loss of consciousness  [] [] numbness/tingling sensation  [] [] tremors  [] [x] weakness in limbs  [] [] difficulty with balance  [] [] frequent or recurring headaches  [] [] memory loss   [] [] comatose  [x] [] seizures   Psychiatric:   [] [] depression  [] [] hallucinations   Endocrine: [] [] excessive thirst or urination   [] [] heat or cold intolerance   Hematologic/Lymphatic: [] [] bleeding tendency  [] [] enlarged lymph nodes     PMH:   Past Medical History:   Diagnosis Date    Diabetes     Dyslipidemia     Hypertension     Noncompliance     Polysubstance abuse     ETOH,  marijuana    Psychosis     Pulmonary hypertension     RVSP 60mmHg (ECHO 6/15)    Seizure disorder         Problem List: Principal Problem:    Epilepsy with status epilepticus ([facility] [address] (6/28/2018)    Active Problems:    Diabetes () Hypertension ()      Dyslipidemia ()        FH: History reviewed. No pertinent family history. SH:   Social History     Social History    Marital status:      Spouse name: N/A    Number of children: N/A    Years of education: N/A     Social History Main Topics    Smoking status: Current Every Day Smoker     Packs/day: 0.25    Smokeless tobacco: None    Alcohol use Yes      Comment: occasional beer    Drug use: Yes     Special: Marijuana, Cocaine      Comment: last used marijuanna 2/25/15    Sexual activity: Not Asked     Other Topics Concern    None     Social History Narrative          Vital Signs:   Visit Vitals    /90    Pulse 86    Temp 98 °F (36.7 °C)    Resp 16    Ht 5' 6\" (1.676 m)    Wt 74.7 kg (164 lb 9.6 oz)    SpO2 100%    BMI 26.57 kg/m2      Neurological examination:    Appearance: In no acute distress, well developed, well nourished, cooperative   Cardiovascular: Heart is regular rate and rhythm, peripheral edema is absent. No carotid bruits heard   Mental status examination: Alert and oriented to self. Speech abulic. Remains alert but stares off into space. Will blink to visual threat during this time. Decreased memory and fund of knowledge. Follow simple commands.  Cranial Nerves:     I: smell Not tested   II: visual fields Visual fields were grossly intact to confrontation  Eye movements were full and conjugate, saccades were accurate, pursuit movements were smooth, and there was no nystagmus. II: pupils Equal, round, reactive to light   III,VII: ptosis none   III,IV,VI: extraocular muscles  Full ROM   V: facial light touch sensation  normal   V,VII: corneal reflex     VII: facial muscle function  normal   VIII: hearing    IX: soft palate elevation  Normal. The palate and tongue moved in the midline. IX,X: gag reflex    XI: sternocleidomastoid strength 5/5   XII: tongue strength  Normal.          Motor exam: Station, gait:  deferred.  Muscle tone, bulk and manual muscle testing: antigravity. Spacticity absent.  Sensory: Intact to primary modalities.  Coordination: refused testing   Reflexes: Symmetric and 1+ in bilateral biceps, triceps, brachioradialis, patellar, ankle reflexes. Plantar reflexes are mute.            Medications:      [x] REVIEWED  Current Facility-Administered Medications   Medication    insulin lispro (HUMALOG) injection    glucose chewable tablet 16 g    glucagon (GLUCAGEN) injection 1 mg    dextrose (D50W) injection syrg 12.5-25 g    amLODIPine (NORVASC) tablet 5 mg    atorvastatin (LIPITOR) tablet 20 mg    docusate sodium (COLACE) capsule 100 mg    famotidine (PEPCID) tablet 20 mg    folic acid (FOLVITE) tablet 1 mg    levETIRAcetam (KEPPRA) 1,000 mg in saline (iso-osm) 100 ml IVPB    LORazepam (ATIVAN) injection 2 mg    dextrose 5 % - 0.45% NaCl infusion    acetaminophen (TYLENOL) tablet 650 mg    ondansetron (ZOFRAN) injection 4 mg    enoxaparin (LOVENOX) injection 40 mg    hydrALAZINE (APRESOLINE) 20 mg/mL injection 10 mg    insulin lispro (HUMALOG) injection    dextrose (D50W) injection syrg 12.5-25 g     Data:      [x] REVIEWED  Recent Results (from the past 24 hour(s))   POC LACTIC ACID    Collection Time: 06/28/18  5:13 PM   Result Value Ref Range    Lactic Acid (POC) 1.3 0.4 - 2.0 mmol/L   GLUCOSE, POC    Collection Time: 06/28/18 11:34 PM   Result Value Ref Range    Glucose (POC) 94 70 - 241 mg/dL   METABOLIC PANEL, BASIC    Collection Time: 06/29/18  3:30 AM   Result Value Ref Range    Sodium 139 136 - 145 mmol/L    Potassium 3.5 3.5 - 5.5 mmol/L    Chloride 107 100 - 108 mmol/L    CO2 24 21 - 32 mmol/L    Anion gap 8 3.0 - 18 mmol/L    Glucose 86 74 - 99 mg/dL    BUN 8 7.0 - 18 MG/DL    Creatinine 0.69 0.6 - 1.3 MG/DL    BUN/Creatinine ratio 12 12 - 20      GFR est AA >60 >60 ml/min/1.73m2    GFR est non-AA >60 >60 ml/min/1.73m2    Calcium 8.2 (L) 8.5 - 10.1 MG/DL   CBC W/O DIFF    Collection Time: 06/29/18  3:30 AM   Result Value Ref Range    WBC 4.5 (L) 4.6 - 13.2 K/uL    RBC 3.56 (L) 4.20 - 5.30 M/uL    HGB 12.0 12.0 - 16.0 g/dL    HCT 35.7 35.0 - 45.0 %    .3 (H) 74.0 - 97.0 FL    MCH 33.7 24.0 - 34.0 PG    MCHC 33.6 31.0 - 37.0 g/dL    RDW 14.1 11.6 - 14.5 %    PLATELET 944 (L) 085 - 420 K/uL    MPV 10.4 9.2 - 11.8 FL   GLUCOSE, POC    Collection Time: 06/29/18  5:29 AM   Result Value Ref Range    Glucose (POC) 109 70 - 110 mg/dL   GLUCOSE, POC    Collection Time: 06/29/18 12:15 PM   Result Value Ref Range    Glucose (POC) 122 (H) 70 - 110 mg/dL       Farideh Sherman, NP

## 2018-06-29 NOTE — PROGRESS NOTES
Pt becoming extremely agitated. Pt attempting to climb out of bed, threatening to leave AMA. Constant reinforcement and redirection being provided by Mundo Crain RN. Pt states she needs to use restroom, but refusing to use bedpan and bedside commode being provided. Pt states she will put her clothes on and leave.   Pt refusedMRI at this time, stating she only

## 2018-06-29 NOTE — PROGRESS NOTES
Problem: Falls - Risk of  Goal: *Absence of Falls  Document Cristal Fall Risk and appropriate interventions in the flowsheet.    Outcome: Progressing Towards Goal  Fall Risk Interventions:  Mobility Interventions: Bed/chair exit alarm    Mentation Interventions: Evaluate medications/consider consulting pharmacy    Medication Interventions: Evaluate medications/consider consulting pharmacy    Elimination Interventions: Call light in reach, Patient to call for help with toileting needs

## 2018-06-29 NOTE — ROUTINE PROCESS
6449 Bedside report given to [address] (oncoming nurse)  Per Jackie Abrams RN (offgoing nurse) utilizing SBAR, MAR, Kardex, I&O, results review, and EKG interpretation with rate and rhythm.

## 2018-06-29 NOTE — PROCEDURES
[facility]  EEG    Michelle Fay  MR#: [id number]  : 1955  ACCOUNT #: [de-identified]   DATE OF SERVICE: 2018    STUDY NUMBER:       INDICATIONS:  This is a 50-year-old female with history of epilepsy who presented with a seizure around 4 a.m. with reports of last seizure about 1 year ago. EMS called and she received Versed and Ativan with improvement. She is on Keppra and compliant. Currently postictal and afebrile at the time of the EEG. FINDINGS:  This is a referential and bipolar EEG recorded with a 10-20 system. The patient's mental status has her hypoactive. She tries to follow commands and answer questions, but she is only partially oriented. Her background rhythm is about 6-7 Hz. It is symmetric and is responsive to eye opening and closing. There is artifact from muscle movement that is present in the study. Photic stimulation did not induce any epileptiform activity. No hyperventilation was performed. There is intermittently some slowness that is generalized within 4-5 Hz activity. Sleep and drowsiness were not recorded. ELECTROENCEPHALOGRAM IMPRESSION:  This is an abnormal EEG due to the findings discussed above, suggestive of a cortical dysfunction, can be compatible to postictal status. There was no epileptiform activity suggestive of ongoing seizures.       Lydia Henderson MD       CAF / PN  D: 2018 13:25     T: 2018 19:05  JOB #: [id number]

## 2018-06-29 NOTE — PROGRESS NOTES
1900 assumed care of patient from [facility]. Patient lethargic but arousable. Follows commands. PIV bilateral arms. 2000 rails padded for safety.

## 2018-06-29 NOTE — PROGRESS NOTES
[facility]  Hospitalist Division           Inpatient Daily Progress Note        Patient: Luis Amaro MRN: [id number]  CSN: [id number]    YOB: 1955  Age: 60 y.o. Sex: female    DOA: 6/28/2018 LOS:  LOS: 1 day                    Chief Complaint:  Seizure     Interval History:    Teodora Nieves a 60 y. o. female with a history of epilepsy who presents with a seizure around 4am with reports per EMR that last seizure was 1 year ago. Hx limited 2/2 patient condition. EMS was called and gave 5mg IV versed and 2mg Iv ativan with imrpovement. Patient is on keppra and compliant. Patient was postical. In ED temp 100.4. Tele neuro was consulted. Patient admitted for further management. Neurology consulted 6/29. Subjective:      Family at bedside. Alert. Objective:      Visit Vitals    /90    Pulse 86    Temp 98 °F (36.7 °C)    Resp 16    Ht 5' 6\" (1.676 m)    Wt 74.7 kg (164 lb 9.6 oz)    SpO2 100%    BMI 26.57 kg/m2       Physical Exam:  General appearance: awake, alert to self, in no distress   Lungs: clear to auscultation bilaterally  Heart: regular rate and rhythm, S1, S2 normal, no murmur, click, rub or gallop  Abdomen: soft, non tender, non distended.  Normoactive bowel sounds  Extremities: extremities normal, atraumatic, no cyanosis or edema  Skin: Skin color, texture, turgor normal. No rashes or lesions  Neurologic: Grossly normal  PSY: appropriately behaved      Intake and Output:  Current Shift:  06/29 0701 - 06/29 1900  In: 625 [I.V.:625]  Out: 400 [Urine:400]  Last three shifts:  06/27 1901 - 06/29 0700  In: 845 [I.V.:845]  Out: -     Recent Results (from the past 24 hour(s))   POC LACTIC ACID    Collection Time: 06/28/18  5:13 PM   Result Value Ref Range    Lactic Acid (POC) 1.3 0.4 - 2.0 mmol/L   GLUCOSE, POC    Collection Time: 06/28/18 11:34 PM   Result Value Ref Range    Glucose (POC) 94 70 - 685 mg/dL   METABOLIC PANEL, BASIC    Collection Time: 06/29/18  3:30 AM   Result Value Ref Range    Sodium 139 136 - 145 mmol/L    Potassium 3.5 3.5 - 5.5 mmol/L    Chloride 107 100 - 108 mmol/L    CO2 24 21 - 32 mmol/L    Anion gap 8 3.0 - 18 mmol/L    Glucose 86 74 - 99 mg/dL    BUN 8 7.0 - 18 MG/DL    Creatinine 0.69 0.6 - 1.3 MG/DL    BUN/Creatinine ratio 12 12 - 20      GFR est AA >60 >60 ml/min/1.73m2    GFR est non-AA >60 >60 ml/min/1.73m2    Calcium 8.2 (L) 8.5 - 10.1 MG/DL   CBC W/O DIFF    Collection Time: 06/29/18  3:30 AM   Result Value Ref Range    WBC 4.5 (L) 4.6 - 13.2 K/uL    RBC 3.56 (L) 4.20 - 5.30 M/uL    HGB 12.0 12.0 - 16.0 g/dL    HCT 35.7 35.0 - 45.0 %    .3 (H) 74.0 - 97.0 FL    MCH 33.7 24.0 - 34.0 PG    MCHC 33.6 31.0 - 37.0 g/dL    RDW 14.1 11.6 - 14.5 %    PLATELET 329 (L) 409 - 420 K/uL    MPV 10.4 9.2 - 11.8 FL   GLUCOSE, POC    Collection Time: 06/29/18  5:29 AM   Result Value Ref Range    Glucose (POC) 109 70 - 110 mg/dL   GLUCOSE, POC    Collection Time: 06/29/18 12:15 PM   Result Value Ref Range    Glucose (POC) 122 (H) 70 - 110 mg/dL           Lab Results   Component Value Date/Time    Glucose 86 06/29/2018 03:30 AM    Glucose 123 (H) 06/28/2018 09:25 AM    Glucose 75 05/08/2017 05:38 AM    Glucose 76 05/07/2017 07:10 AM    Glucose 76 05/06/2017 04:25 AM        Assessment/Plan:     Patient Active Problem List   Diagnosis Code    Diabetes E11.9    Hypertension I11.9    Dyslipidemia E78.5    Bradycardia R00.1    Acute respiratory failure (HCC) J96.00    [facility] G40.901    Epilepsy with status epilepticus ([address] G40.901       Plan:  Epilepsy  - Neurology consulted 6/29  - Keppra 1500 mg BID  - Seizure precautions      SIRS  - unclear source  - lactic elevated however expected likley with recent seizures  - abx  - possible aspiration from seizure?   - UA ok  - blood cultures      DM II  - SSI   - Diabetic diet      Essential HTN  - amlodipine     HLD  - Atorvastatin     DVT ANNIE BarnettP-BC  [address] [place] [address]  Hospitalist Division  Pager:  [phone]  Office:  [phone]

## 2018-06-29 NOTE — PROGRESS NOTES
Paged attending x's 2 in regards to pt wanting to leave AMA. Pt continues climbing OOB without assistance. Pt said she would like to get dressed, and leave the hospital.  Informed pt of ramifications associated with leaving facility against medical advice. Pt states for right now she will stay. Frequent reorientation required.

## 2018-06-29 NOTE — PROGRESS NOTES
Patient arrived to unit from 2700. Patient alert to self only, bed alarm and TABS unit in place. Bed locked and in lowest position with call bell in reach. 1604- Dr. Perea Enter paged to see if patient needs telemetry and continuous pulse oximetry. Will await call back. 1845- Orders received from Dr. Perea Enter for patient to receive Effexor 225 mg daily and Humira pen daily. RBV.     1925-Bedside and Verbal shift change report given to Yary Regalado RN   (oncoming nurse) by Anny Wilson (offgoing nurse). Report included the following information SBAR, Kardex, Intake/Output, MAR and Recent Results.

## 2018-06-29 NOTE — PROGRESS NOTES
2090 Assumed care of pt after bedside report with pt lying in bed on lt side with HOB elevated. Monitor denotes NSR. VSS. Pt resting at present with no seizure activity noted. Lungs clear and pt on RA. Pt incontinent of urine at times. Pt on seizure precautions and siderails are padded. Unable to complete. Adm hx database due to pt's neuro status and drowsiness. Sitter at bedside. 0300 Resting at present without complaints. Pt is able to be arouse and turns herself in bed. Pt also lets know when she needs to urinate. 0600 Accucheck result 109. No Lispro insulin given as per sliding scale coverage.

## 2018-06-30 VITALS
BODY MASS INDEX: 26.45 KG/M2 | WEIGHT: 164.6 LBS | SYSTOLIC BLOOD PRESSURE: 145 MMHG | DIASTOLIC BLOOD PRESSURE: 84 MMHG | OXYGEN SATURATION: 100 % | RESPIRATION RATE: 17 BRPM | TEMPERATURE: 98.3 F | HEART RATE: 82 BPM | HEIGHT: 66 IN

## 2018-06-30 PROBLEM — R56.9 SEIZURE (HCC): Status: ACTIVE | Noted: 2018-06-30

## 2018-06-30 LAB
ANION GAP SERPL CALC-SCNC: 8 MMOL/L (ref 3–18)
BUN SERPL-MCNC: 6 MG/DL (ref 7–18)
BUN/CREAT SERPL: 10 (ref 12–20)
CALCIUM SERPL-MCNC: 9.3 MG/DL (ref 8.5–10.1)
CHLORIDE SERPL-SCNC: 106 MMOL/L (ref 100–108)
CO2 SERPL-SCNC: 24 MMOL/L (ref 21–32)
CREAT SERPL-MCNC: 0.61 MG/DL (ref 0.6–1.3)
ERYTHROCYTE [DISTWIDTH] IN BLOOD BY AUTOMATED COUNT: 13.9 % (ref 11.6–14.5)
GLUCOSE BLD STRIP.AUTO-MCNC: 133 MG/DL (ref 70–110)
GLUCOSE SERPL-MCNC: 90 MG/DL (ref 74–99)
HCT VFR BLD AUTO: 37.8 % (ref 35–45)
HGB BLD-MCNC: 12.7 G/DL (ref 12–16)
MCH RBC QN AUTO: 33.4 PG (ref 24–34)
MCHC RBC AUTO-ENTMCNC: 33.6 G/DL (ref 31–37)
MCV RBC AUTO: 99.5 FL (ref 74–97)
PLATELET # BLD AUTO: 135 K/UL (ref 135–420)
PMV BLD AUTO: 9.6 FL (ref 9.2–11.8)
POTASSIUM SERPL-SCNC: 3.2 MMOL/L (ref 3.5–5.5)
RBC # BLD AUTO: 3.8 M/UL (ref 4.2–5.3)
SODIUM SERPL-SCNC: 138 MMOL/L (ref 136–145)
WBC # BLD AUTO: 5.5 K/UL (ref 4.6–13.2)

## 2018-06-30 PROCEDURE — 82962 GLUCOSE BLOOD TEST: CPT

## 2018-06-30 PROCEDURE — 85027 COMPLETE CBC AUTOMATED: CPT | Performed by: HOSPITALIST

## 2018-06-30 PROCEDURE — 74011250637 HC RX REV CODE- 250/637: Performed by: HOSPITALIST

## 2018-06-30 PROCEDURE — 99218 HC RM OBSERVATION: CPT

## 2018-06-30 PROCEDURE — 36415 COLL VENOUS BLD VENIPUNCTURE: CPT | Performed by: HOSPITALIST

## 2018-06-30 PROCEDURE — 74011250636 HC RX REV CODE- 250/636: Performed by: NURSE PRACTITIONER

## 2018-06-30 PROCEDURE — 74011250637 HC RX REV CODE- 250/637: Performed by: NURSE PRACTITIONER

## 2018-06-30 PROCEDURE — 80048 BASIC METABOLIC PNL TOTAL CA: CPT | Performed by: HOSPITALIST

## 2018-06-30 RX ORDER — LEVETIRACETAM 750 MG/1
1500 TABLET ORAL 2 TIMES DAILY
Qty: 120 TAB | Refills: 0 | Status: SHIPPED | OUTPATIENT
Start: 2018-06-30

## 2018-06-30 RX ADMIN — FAMOTIDINE 20 MG: 20 TABLET ORAL at 09:04

## 2018-06-30 RX ADMIN — AMLODIPINE BESYLATE 5 MG: 5 TABLET ORAL at 09:06

## 2018-06-30 RX ADMIN — GABAPENTIN 100 MG: 100 CAPSULE ORAL at 09:04

## 2018-06-30 RX ADMIN — CHLORPROMAZINE HYDROCHLORIDE 25 MG: 25 TABLET, SUGAR COATED ORAL at 09:04

## 2018-06-30 RX ADMIN — VENLAFAXINE HYDROCHLORIDE 225 MG: 75 CAPSULE, EXTENDED RELEASE ORAL at 09:03

## 2018-06-30 RX ADMIN — LEVETIRACETAM 1500 MG: 15 INJECTION INTRAVENOUS at 06:26

## 2018-06-30 RX ADMIN — FOLIC ACID 1 MG: 1 TABLET ORAL at 09:04

## 2018-06-30 RX ADMIN — DOCUSATE SODIUM 100 MG: 100 CAPSULE, LIQUID FILLED ORAL at 09:04

## 2018-06-30 NOTE — PROGRESS NOTES
Problem: Falls - Risk of  Goal: *Absence of Falls  Document Cristal Fall Risk and appropriate interventions in the flowsheet. Outcome: Progressing Towards Goal  Fall Risk Interventions:  Mobility Interventions: Communicate number of staff needed for ambulation/transfer, Patient to call before getting OOB    Mentation Interventions: Door open when patient unattended    Medication Interventions: Patient to call before getting OOB    Elimination Interventions: Call light in reach    History of Falls Interventions: Door open when patient unattended        Problem: Pressure Injury - Risk of  Goal: *Prevention of pressure injury  Document Jerman Scale and appropriate interventions in the flowsheet.    Outcome: Progressing Towards Goal  Pressure Injury Interventions:       Moisture Interventions: Absorbent underpads    Activity Interventions: Pressure redistribution bed/mattress(bed type)    Mobility Interventions: Pressure redistribution bed/mattress (bed type)    Nutrition Interventions: Document food/fluid/supplement intake    Friction and Shear Interventions: HOB 30 degrees or less

## 2018-06-30 NOTE — PROGRESS NOTES
1925 - Assumed pt care from Moira Reid, [address] [place] [address]. Pt in bed, alert, confused and impulsive. Not in any form of distress. Denies pain. Frequent use items and call bell within reach. Verbalized understanding to call for assistance. Bed locked in lowest position. Bed alarm on. Family at bedside. 2230 - Pt removed the IV line. Applied pressure and gauze on the site. Pt transferred to the nurses' station. Tabs unit on. IV inserted attempted x 2, both did not work. ICU nurse call. 2310 - ICU nurse arrived and inserted 22 on the right forearm. Fluids resumed. Pt requested to watch TV in the family room. Pt transferred to the family room. Seizure precautions maintained. Tabs alarm on. Pt still attempts to stand up and walk. Redirected patient. 2230 - 0530 - Pt continuously gets up from the recliner. Pt redirected and been monitored closely near the nurses' station. Tabs alarm on. 5 P's Assess. Pt balance is OK but is very confused and under observation. Redirected Pt multiple time. 9557 - Pt removed her IV line on the R FA. Pressure and gauze applied. New IV line inserted, 22 on the L FA inserted by Jeannie Dior RN.    1972 - Bedside and Verbal shift change report given to MARLENY Howard (oncoming nurse) by Riya Beaulieu RN (offgoing nurse). Report included the following information SBAR, Kardex, Intake/Output, MAR and Recent Results.

## 2018-06-30 NOTE — PROGRESS NOTES
Patient received in bed awake. Patient A&Ox self, denies pain and discomfort. Patient appears anxious and frequently getting out of bed with bed alarm activated. Frequently used items within reach. Bed locked in low position. Call bell within reach and patient verbalized understanding of use for assistance and needs. 0800 -- Spoke with Dr. Marcelino Caballero on the phone regarding getting a PRN medication for anxiety. Dr. Marcelino Caballero stated that he was planning to just discharge the patient. No orders given for PRN anxiety medication. 1020 -- Reviewed discharge instructions, prescriptions, and follow up care with the patient and her . Patient and her  verbalized understanding. IV and cardiac monitor taken off patient. Armband cut off and shredded. Patient discharged from unit via wheelchair and accompanied by this nurse to the hospital entrance and into a taxi. No distress or concerns noted at this time.

## 2018-06-30 NOTE — DISCHARGE SUMMARY
Discharge Summary    Patient: Eloina Stage               Sex: female          DOA: 6/28/2018         YOB: 1955      Age:  60 y.o.        LOS:  LOS: 2 days                Admit Date: 6/28/2018    Discharge Date: 6/30/2018    Discharge Medications:     Discharge Medication List as of 6/30/2018 10:05 AM      CONTINUE these medications which have CHANGED    Details   levETIRAcetam (KEPPRA) 750 mg tablet Take 2 Tabs by mouth two (2) times a day., Print, Disp-120 Tab, R-0         CONTINUE these medications which have NOT CHANGED    Details   venlafaxine (EFFEXOR) 75 mg tablet Take 75 mg by mouth daily. , Historical Med      mirtazapine (REMERON) 15 mg tablet Take 15 mg by mouth nightly., Historical Med      cholecalciferol, vitamin D3, (VITAMIN D3) 2,000 unit tab Take 2,000 Int'l Units by mouth daily. , Historical Med      docusate sodium (COLACE) 100 mg capsule Take 100 mg by mouth two (2) times a day., Historical Med      phenol throat spray (SORE THROAT, PHENOL,) 1.4 % spray Take 1 Spray by mouth as needed for Sore throat or pain/local anesthesia., Historical Med      adalimumab 40 mg/0.8 mL pnkt 40 mg by SubCUTAneous route Once every 2 weeks. Indications: RHEUMATOID ARTHRITIS, Historical Med      Omega-3 Fatty Acids-Fish Oil 360-1,200 mg cpDR Take 1,200 mg by mouth daily. , Historical Med      CALCIUM CARB/VIT D3/MINERALS (CALCIUM-VITAMIN D PO) Take 1,200 mg by mouth daily. , Historical Med      gabapentin (NEURONTIN) 100 mg capsule Take 100 mg by mouth three (3) times daily. , Historical Med      chlorproMAZINE (THORAZINE) 50 mg tablet Take 25 mg by mouth two (2) times a day., Historical Med      venlafaxine-SR (EFFEXOR-XR) 150 mg capsule Take 150 mg by mouth daily. , Historical Med      !! atorvastatin (LIPITOR) 20 mg tablet Take 20 mg by mouth daily. , Historical Med      OLANZapine (ZYPREXA) 5 mg tablet Take 15 mg by mouth nightly., Historical Med      !! atorvastatin (LIPITOR) 20 mg tablet Take 1 Tab by mouth nightly. , Print, Disp-30 Tab, R-0      ondansetron hcl (ZOFRAN, AS HYDROCHLORIDE,) 4 mg tablet Take 1 Tab by mouth every eight (8) hours as needed for Nausea. , Print, Disp-30 Tab, R-0      loratadine (CLARITIN) 10 mg tablet Take 10 mg by mouth daily. , Historical Med      famotidine (PEPCID) 20 mg tablet Take 20 mg by mouth two (2) times a day., Historical Med      amLODIPine (NORVASC) 5 mg tablet Take 5 mg by mouth daily. , Historical Med      folic acid (FOLVITE) 1 mg tablet Take 1 mg by mouth daily. , Historical Med       !! - Potential duplicate medications found. Please discuss with provider. STOP taking these medications       oxyCODONE-acetaminophen (PERCOCET) 5-325 mg per tablet Comments:   Reason for Stopping:         sertraline (ZOLOFT) 100 mg tablet Comments:   Reason for Stopping: Follow-up: PCP, neurology    Discharge Condition: Stable    Activity: Activity as tolerated  NO driving 6 months    Diet: Resume previous diet    Labs:  Labs: Results:       Chemistry Recent Labs      06/30/18 0520 06/29/18   0330 06/28/18   0925   GLU  90  86  123*   NA  138  139  141   K  3.2*  3.5  4.2   CL  106  107  108   CO2  24  24  25   BUN  6*  8  12   CREA  0.61  0.69  0.77   CA  9.3  8.2*  7.7*   AGAP  8  8  8   BUCR  10*  12  16   AP   --    --   117   TP   --    --   7.8   ALB   --    --   3.1*   GLOB   --    --   4.7*   AGRAT   --    --   0.7*      CBC w/Diff Recent Labs      06/30/18   0520 06/29/18   0330 06/28/18   0830   WBC  5.5  4.5*  6.1   RBC  3.80*  3.56*  3.92*   HGB  12.7  12.0  12.9   HCT  37.8  35.7  38.4   PLT  135  126*  133*   GRANS   --    --   72   LYMPH   --    --   21   EOS   --    --   1      Cardiac Enzymes No results for input(s): CPK, CKND1, JETHRO in the last 72 hours. No lab exists for component: CKRMB, TROIP   Coagulation No results for input(s): PTP, INR, APTT in the last 72 hours.     No lab exists for component: INREXT    Lipid Panel Lab Results Component Value Date/Time    Cholesterol, total 166 06/14/2015 08:06 PM    HDL Cholesterol 80 (H) 06/14/2015 08:06 PM    LDL, calculated 75.8 06/14/2015 08:06 PM    VLDL, calculated 10.2 06/14/2015 08:06 PM    Triglyceride 51 06/14/2015 08:06 PM    CHOL/HDL Ratio 2.1 06/14/2015 08:06 PM      BNP No results for input(s): BNPP in the last 72 hours. Liver Enzymes Recent Labs      06/28/18   0925   TP  7.8   ALB  3.1*   AP  117   SGOT  124*      Thyroid Studies Lab Results   Component Value Date/Time    TSH 1.36 05/01/2017 04:00 PM          Imaging:     Could not tolerate MRI    Consults: Neurology    Treatment Team: Treatment Team: Consulting Provider: Amie Hernandez MD; Consulting Provider: Alejandra Godinez MD; Care Manager: Jayme Melendez;  Consulting Provider: Nina Bueno MD; Utilization Review: Tasha Garibay RN    Significant Diagnostic Studies: labs:   Recent Results (from the past 24 hour(s))   GLUCOSE, POC    Collection Time: 06/29/18  5:01 PM   Result Value Ref Range    Glucose (POC) 110 70 - 110 mg/dL   DRUG SCREEN, URINE    Collection Time: 06/29/18  6:45 PM   Result Value Ref Range    BENZODIAZEPINES POSITIVE (A) NEG      BARBITURATES NEGATIVE  NEG      THC (TH-CANNABINOL) POSITIVE (A) NEG      OPIATES NEGATIVE  NEG      PCP(PHENCYCLIDINE) NEGATIVE  NEG      COCAINE NEGATIVE  NEG      AMPHETAMINES NEGATIVE  NEG      METHADONE NEGATIVE  NEG      HDSCOM (NOTE)    GLUCOSE, POC    Collection Time: 06/29/18  9:00 PM   Result Value Ref Range    Glucose (POC) 116 (H) 70 - 816 mg/dL   METABOLIC PANEL, BASIC    Collection Time: 06/30/18  5:20 AM   Result Value Ref Range    Sodium 138 136 - 145 mmol/L    Potassium 3.2 (L) 3.5 - 5.5 mmol/L    Chloride 106 100 - 108 mmol/L    CO2 24 21 - 32 mmol/L    Anion gap 8 3.0 - 18 mmol/L    Glucose 90 74 - 99 mg/dL    BUN 6 (L) 7.0 - 18 MG/DL    Creatinine 0.61 0.6 - 1.3 MG/DL    BUN/Creatinine ratio 10 (L) 12 - 20      GFR est AA >60 >60 ml/min/1.73m2    GFR est non-AA >60 >60 ml/min/1.73m2    Calcium 9.3 8.5 - 10.1 MG/DL   CBC W/O DIFF    Collection Time: 06/30/18  5:20 AM   Result Value Ref Range    WBC 5.5 4.6 - 13.2 K/uL    RBC 3.80 (L) 4.20 - 5.30 M/uL    HGB 12.7 12.0 - 16.0 g/dL    HCT 37.8 35.0 - 45.0 %    MCV 99.5 (H) 74.0 - 97.0 FL    MCH 33.4 24.0 - 34.0 PG    MCHC 33.6 31.0 - 37.0 g/dL    RDW 13.9 11.6 - 14.5 %    PLATELET 899 797 - 025 K/uL    MPV 9.6 9.2 - 11.8 FL   GLUCOSE, POC    Collection Time: 06/30/18  8:17 AM   Result Value Ref Range    Glucose (POC) 133 (H) 70 - 110 mg/dL         Discharge diagnoses:    Problem List as of 6/30/2018  Date Reviewed: 5/1/2017          Codes Class Noted - Resolved    Seizure ([facility] ICD-10-CM: R56.9  ICD-9-CM: 780.39  6/30/2018 - Present        * (Principal)Epilepsy with status epilepticus ([facility] ICD-10-CM: G40.901  ICD-9-CM: 345.3  6/28/2018 - Present        Acute respiratory failure ([facility] ICD-10-CM: J96.00  ICD-9-CM: 518.81  5/1/2017 - Present        Status epilepticus ([facility] ICD-10-CM: G40.901  ICD-9-CM: 345.3  5/1/2017 - Present        Diabetes ICD-10-CM: E11.9  ICD-9-CM: 250.00  Unknown - Present        Hypertension ICD-10-CM: I11.9  ICD-9-CM: 402.10  Unknown - Present        Dyslipidemia ICD-10-CM: E78.5  ICD-9-CM: 272.4  Unknown - Present        Bradycardia ICD-10-CM: R00.1  ICD-9-CM: 427.89  Unknown - Present        RESOLVED: Multiple transverse process fractures ICD-10-CM: [id number]  ICD-9-CM: 805.8  3/2/2015 - 4/28/2015    Overview Signed 3/2/2015  4:44 PM by MIGUEL Mendez     L2, 3 and 4             RESOLVED: Diabetes ([facility] ICD-10-CM: E11.9  ICD-9-CM: 250.00  3/2/2015 - 4/28/2015            Hospital Course:   Major issues addressed during hospitalization outlined  below. Claudy Whitman a 60 y. o. female with a history of epilepsy who presents with a seizure around 4am with reports per EMR that last seizure was 1 year ago. Hx limited 2/2 patient condition.  EMS was called and gave 5mg IV versed and 2mg Iv ativan with imrpovement. Patient is on keppra and complicant. Patient is postical. In ED temp 100.4. Tele neuro was consulted. Neurology was consulted the following day. Patient was initially admitted to ICU to monitor resp status as she had a prolonged postictal time. Keppra was provided IV. Neurology did not have new recommendations for medications and suggested medication compliance. She was awake alert and doing well the following day and safely discharged home.       Wyatt Rivera MD  June 30, 2018        Total time spent 40 minutes

## 2018-06-30 NOTE — DISCHARGE INSTRUCTIONS
DISCHARGE SUMMARY from Nurse    PATIENT INSTRUCTIONS:    What to do at Home:  Recommended activity: Activity as tolerated and no driving for today. If you experience any of the following symptoms listed in your \"When Should You Call For Help? \" section of your discharge instructions, please follow up with your primary care doctor and/or call 911. *  Please give a list of your current medications to your Primary Care Provider. *  Please update this list whenever your medications are discontinued, doses are      changed, or new medications (including over-the-counter products) are added. *  Please carry medication information at all times in case of emergency situations. These are general instructions for a healthy lifestyle:    No smoking/ No tobacco products/ Avoid exposure to second hand smoke  Surgeon General's Warning:  Quitting smoking now greatly reduces serious risk to your health. Obesity, smoking, and sedentary lifestyle greatly increases your risk for illness    A healthy diet, regular physical exercise & weight monitoring are important for maintaining a healthy lifestyle    You may be retaining fluid if you have a history of heart failure or if you experience any of the following symptoms:  Weight gain of 3 pounds or more overnight or 5 pounds in a week, increased swelling in our hands or feet or shortness of breath while lying flat in bed. Please call your doctor as soon as you notice any of these symptoms; do not wait until your next office visit. Recognize signs and symptoms of STROKE:    F-face looks uneven    A-arms unable to move or move unevenly    S-speech slurred or non-existent    T-time-call 911 as soon as signs and symptoms begin-DO NOT go       Back to bed or wait to see if you get better-TIME IS BRAIN. Warning Signs of HEART ATTACK     Call 911 if you have these symptoms:   Chest discomfort.  Most heart attacks involve discomfort in the center of the chest that lasts more than a few minutes, or that goes away and comes back. It can feel like uncomfortable pressure, squeezing, fullness, or pain.  Discomfort in other areas of the upper body. Symptoms can include pain or discomfort in one or both arms, the back, neck, jaw, or stomach.  Shortness of breath with or without chest discomfort.  Other signs may include breaking out in a cold sweat, nausea, or lightheadedness. Don't wait more than five minutes to call 911 - MINUTES MATTER! Fast action can save your life. Calling 911 is almost always the fastest way to get lifesaving treatment. Emergency Medical Services staff can begin treatment when they arrive -- up to an hour sooner than if someone gets to the hospital by car. The discharge information has been reviewed with the patient and spouse. The patient and spouse verbalized understanding. Discharge medications reviewed with the patient and spouse and appropriate educational materials and side effects teaching were provided. ___________________________________________________________________________________________________________________________________    Patient armband removed and shredded.

## 2018-07-04 LAB
BACTERIA SPEC CULT: NORMAL
BACTERIA SPEC CULT: NORMAL
SERVICE CMNT-IMP: NORMAL
SERVICE CMNT-IMP: NORMAL

## 2019-07-12 ENCOUNTER — HOSPITAL ENCOUNTER (OUTPATIENT)
Dept: LAB | Age: 64
Discharge: HOME | End: 2019-07-12
Payer: SELF-PAY

## 2019-07-12 LAB
25(OH)D3 SERPL-MCNC: 41.4 NG/ML (ref 30–100)
CRP SERPL-MCNC: 0.9 MG/DL (ref 0–0.3)
RHEUMATOID FACT SERPL-ACNC: 253 IU/ML

## 2019-07-12 PROCEDURE — 86038 ANTINUCLEAR ANTIBODIES: CPT

## 2019-07-12 PROCEDURE — 86140 C-REACTIVE PROTEIN: CPT

## 2019-07-12 PROCEDURE — 86235 NUCLEAR ANTIGEN ANTIBODY: CPT

## 2019-07-12 PROCEDURE — 86706 HEP B SURFACE ANTIBODY: CPT

## 2019-07-12 PROCEDURE — 82306 VITAMIN D 25 HYDROXY: CPT

## 2019-07-12 PROCEDURE — 36415 COLL VENOUS BLD VENIPUNCTURE: CPT

## 2019-07-12 PROCEDURE — 86704 HEP B CORE ANTIBODY TOTAL: CPT

## 2019-07-12 PROCEDURE — 86225 DNA ANTIBODY NATIVE: CPT

## 2019-07-12 PROCEDURE — 86200 CCP ANTIBODY: CPT

## 2019-07-12 PROCEDURE — 87340 HEPATITIS B SURFACE AG IA: CPT

## 2019-07-12 PROCEDURE — 86431 RHEUMATOID FACTOR QUANT: CPT

## 2019-07-12 PROCEDURE — 86803 HEPATITIS C AB TEST: CPT

## 2019-07-13 LAB
ANA SER QL: NEGATIVE
COMMENT, 144067: NORMAL
DSDNA AB SER-ACNC: 4 IU/ML (ref 0–9)
ENA RNP AB SER-ACNC: 0.2 AI (ref 0–0.9)
ENA SM AB SER-ACNC: <0.2 AI (ref 0–0.9)
ENA SS-A AB SER-ACNC: <0.2 AI (ref 0–0.9)
ENA SS-B AB SER-ACNC: 0.2 AI (ref 0–0.9)
HBV CORE AB SERPL QL IA: NEGATIVE
HCV AB S/CO SERPL IA: <0.1 S/CO RATIO (ref 0–0.9)

## 2019-07-14 LAB — CCP IGA+IGG SERPL IA-ACNC: >250 UNITS (ref 0–19)

## 2019-07-15 LAB
HBV SURFACE AB SER QL IA: NEGATIVE
HBV SURFACE AB SERPL IA-ACNC: <3.1 MIU/ML
HBV SURFACE AG SER QL: <0.1 INDEX
HBV SURFACE AG SER QL: NEGATIVE
HEP BS AB COMMENT,HBSAC: ABNORMAL

## 2019-12-11 RX ORDER — INSULIN LISPRO 100 [IU]/ML
INJECTION, SOLUTION INTRAVENOUS; SUBCUTANEOUS ONCE
Status: CANCELLED | OUTPATIENT
Start: 2019-12-12 | End: 2019-12-12

## 2019-12-11 RX ORDER — SODIUM CHLORIDE, SODIUM LACTATE, POTASSIUM CHLORIDE, CALCIUM CHLORIDE 600; 310; 30; 20 MG/100ML; MG/100ML; MG/100ML; MG/100ML
100 INJECTION, SOLUTION INTRAVENOUS CONTINUOUS
Status: CANCELLED | OUTPATIENT
Start: 2019-12-11 | End: 2019-12-12

## 2019-12-11 RX ORDER — SODIUM CHLORIDE 0.9 % (FLUSH) 0.9 %
5-40 SYRINGE (ML) INJECTION AS NEEDED
Status: CANCELLED | OUTPATIENT
Start: 2019-12-11

## 2019-12-11 RX ORDER — DEXTROMETHORPHAN/PSEUDOEPHED 2.5-7.5/.8
1.2 DROPS ORAL
Status: CANCELLED | OUTPATIENT
Start: 2019-12-11

## 2019-12-11 RX ORDER — SODIUM CHLORIDE 0.9 % (FLUSH) 0.9 %
5-40 SYRINGE (ML) INJECTION EVERY 8 HOURS
Status: CANCELLED | OUTPATIENT
Start: 2019-12-11

## 2019-12-12 ENCOUNTER — HOSPITAL ENCOUNTER (OUTPATIENT)
Age: 64
Setting detail: OUTPATIENT SURGERY
Discharge: HOME | End: 2019-12-12
Attending: INTERNAL MEDICINE | Admitting: INTERNAL MEDICINE

## 2019-12-12 VITALS — HEART RATE: 84 BPM | RESPIRATION RATE: 16 BRPM | OXYGEN SATURATION: 97 %

## 2019-12-12 PROBLEM — K62.5 HEMORRHAGE OF ANUS AND RECTUM: Status: ACTIVE | Noted: 2019-12-12

## 2019-12-12 NOTE — H&P
Date of Surgery Update:  Dominic De La Cruz was seen and examined. History and physical has been reviewed. The patient has been examined.  There have been no significant clinical changes since the completion of the originally dated History and Physical.    Signed By: Marianne Pitts MD     December 12, 2019 8:21 AM

## 2019-12-12 NOTE — H&P
Bedbugs were detected on patient and saved in a specimen container for Risk Management assessment. Discussed with patient and she voluntarily cancelled her procedure aware of the public health risk of this infestation. I advised she have her home inspected and treated if necessary. I will contact her to reschedule her procedure once ready.     Marquis Stone MD

## 2020-02-25 NOTE — PERIOP NOTES
PAT - SURGICAL PRE-ADMISSION INSTRUCTIONS    NAME:  Timur Colbert                                                          TODAY'S DATE:  2/25/2020    SURGERY DATE:  2/28/2020                                  SURGERY ARRIVAL TIME:   0845    1. Do NOT eat or drink anything, including candy or gum, after MIDNIGHT on 2/27/20 , unless you have specific instructions from your Surgeon or Anesthesia Provider to do so. 2. No smoking on the day of surgery. 3. No alcohol 24 hours prior to the day of surgery. 4. No recreational drugs for one week prior to the day of surgery. 5. Leave all valuables, including money/purse, at home. 6. Remove all jewelry, nail polish, makeup (including mascara); no lotions, powders, deodorant, or perfume/cologne/after shave. 7. Glasses/Contact lenses and Dentures may be worn to the hospital.  They will be removed prior to surgery. 8. Call your doctor if symptoms of a cold or illness develop within 24 ours prior to surgery. 9. AN ADULT MUST DRIVE YOU HOME AFTER OUTPATIENT SURGERY. 10. If you are having an OUTPATIENT procedure, please make arrangements for a responsible adult to be with you for 24 hours after your surgery. 11. If you are admitted to the hospital, you will be assigned to a bed after surgery is complete. Normally a family member will not be able to see you until you are in your assigned bed. 15. Family is encouraged to accompany you to the hospital.  We ask visitors in the treatment area to be limited to ONE person at a time to ensure patient privacy. EXCEPTIONS WILL BE MADE AS NEEDED. 15. Children under 12 are discouraged from entering the treatment area and need to be supervised by an adult when in the waiting room. Special Instructions:    Bring list of CURRENT medications . , Take these medications the morning of surgery with a sip of water:  KEPPRA, EFFEXOR, Complete bowel prep per MD instructions.     Patient Prep:    shower with anti-bacterial soap    These surgical instructions were reviewed with PATIENT'S  during the PAT PHONE CALL. Directions: On the morning of surgery, please go to the [address] [facility], [place]. Enter the building from the [facility] entrance, 1st floor (next to the Emergency Room entrance). Take the elevator to the 2nd floor. Sign in at the Registration Desk.     If you have any questions and/or concerns, please do not hesitate to call:  (Prior to the day of surgery)  [facility] unit:  [phone]  (Day of surgery)  [facility] unit:  [phone]

## 2020-02-27 ENCOUNTER — ANESTHESIA EVENT (OUTPATIENT)
Dept: ENDOSCOPY | Age: 65
End: 2020-02-27
Payer: MEDICARE

## 2020-02-28 ENCOUNTER — HOSPITAL ENCOUNTER (EMERGENCY)
Age: 65
Discharge: LEFT BEFORE DISCHARGE | End: 2020-02-28
Attending: EMERGENCY MEDICINE
Payer: MEDICARE

## 2020-02-28 ENCOUNTER — HOSPITAL ENCOUNTER (OUTPATIENT)
Age: 65
Setting detail: OUTPATIENT SURGERY
Discharge: ANOTHER HOSPITAL | End: 2020-02-28
Attending: INTERNAL MEDICINE | Admitting: INTERNAL MEDICINE
Payer: MEDICARE

## 2020-02-28 ENCOUNTER — APPOINTMENT (OUTPATIENT)
Dept: GENERAL RADIOLOGY | Age: 65
End: 2020-02-28
Attending: EMERGENCY MEDICINE
Payer: MEDICARE

## 2020-02-28 ENCOUNTER — ANESTHESIA (OUTPATIENT)
Dept: ENDOSCOPY | Age: 65
End: 2020-02-28
Payer: MEDICARE

## 2020-02-28 VITALS
TEMPERATURE: 98.2 F | OXYGEN SATURATION: 100 % | SYSTOLIC BLOOD PRESSURE: 128 MMHG | HEART RATE: 75 BPM | RESPIRATION RATE: 24 BRPM | DIASTOLIC BLOOD PRESSURE: 95 MMHG

## 2020-02-28 VITALS
SYSTOLIC BLOOD PRESSURE: 135 MMHG | RESPIRATION RATE: 22 BRPM | BODY MASS INDEX: 32.37 KG/M2 | OXYGEN SATURATION: 94 % | HEART RATE: 99 BPM | DIASTOLIC BLOOD PRESSURE: 92 MMHG | WEIGHT: 189.6 LBS | HEIGHT: 64 IN | TEMPERATURE: 97.6 F

## 2020-02-28 DIAGNOSIS — R73.9 HYPERGLYCEMIA: Primary | ICD-10-CM

## 2020-02-28 DIAGNOSIS — R10.84 ABDOMINAL PAIN, GENERALIZED: ICD-10-CM

## 2020-02-28 PROBLEM — K59.04 CHRONIC IDIOPATHIC CONSTIPATION: Status: ACTIVE | Noted: 2020-02-28

## 2020-02-28 LAB
GLUCOSE BLD STRIP.AUTO-MCNC: 168 MG/DL (ref 70–110)
GLUCOSE BLD STRIP.AUTO-MCNC: 240 MG/DL (ref 70–110)
GLUCOSE BLD STRIP.AUTO-MCNC: 274 MG/DL (ref 70–110)
GLUCOSE BLD STRIP.AUTO-MCNC: 301 MG/DL (ref 70–110)

## 2020-02-28 PROCEDURE — 99284 EMERGENCY DEPT VISIT MOD MDM: CPT

## 2020-02-28 PROCEDURE — 74011250636 HC RX REV CODE- 250/636: Performed by: NURSE ANESTHETIST, CERTIFIED REGISTERED

## 2020-02-28 PROCEDURE — 74011636637 HC RX REV CODE- 636/637: Performed by: NURSE ANESTHETIST, CERTIFIED REGISTERED

## 2020-02-28 PROCEDURE — 71045 X-RAY EXAM CHEST 1 VIEW: CPT

## 2020-02-28 PROCEDURE — 82962 GLUCOSE BLOOD TEST: CPT

## 2020-02-28 RX ORDER — DEXTROMETHORPHAN/PSEUDOEPHED 2.5-7.5/.8
1.2 DROPS ORAL
Status: CANCELLED | OUTPATIENT
Start: 2020-02-28

## 2020-02-28 RX ORDER — SODIUM CHLORIDE 0.9 % (FLUSH) 0.9 %
5-40 SYRINGE (ML) INJECTION AS NEEDED
Status: CANCELLED | OUTPATIENT
Start: 2020-02-28

## 2020-02-28 RX ORDER — INSULIN LISPRO 100 [IU]/ML
INJECTION, SOLUTION INTRAVENOUS; SUBCUTANEOUS ONCE
Status: COMPLETED | OUTPATIENT
Start: 2020-02-28 | End: 2020-02-28

## 2020-02-28 RX ORDER — SODIUM CHLORIDE 0.9 % (FLUSH) 0.9 %
5-40 SYRINGE (ML) INJECTION EVERY 8 HOURS
Status: CANCELLED | OUTPATIENT
Start: 2020-02-28

## 2020-02-28 RX ORDER — SODIUM CHLORIDE, SODIUM LACTATE, POTASSIUM CHLORIDE, CALCIUM CHLORIDE 600; 310; 30; 20 MG/100ML; MG/100ML; MG/100ML; MG/100ML
50 INJECTION, SOLUTION INTRAVENOUS CONTINUOUS
Status: DISCONTINUED | OUTPATIENT
Start: 2020-02-28 | End: 2020-02-28 | Stop reason: HOSPADM

## 2020-02-28 RX ADMIN — INSULIN LISPRO 6 UNITS: 100 INJECTION, SOLUTION INTRAVENOUS; SUBCUTANEOUS at 09:09

## 2020-02-28 RX ADMIN — SODIUM CHLORIDE, SODIUM LACTATE, POTASSIUM CHLORIDE, AND CALCIUM CHLORIDE 50 ML/HR: 600; 310; 30; 20 INJECTION, SOLUTION INTRAVENOUS at 09:21

## 2020-02-28 NOTE — PERIOP NOTES
Pre-Op Summary    [facility] 301 upon arrival to phase 2. Anesthesiologist Dr. Luis Alfredo Sands notified and recommended no treatment, secondary to patient not taking any diabetic meds at home and history of noncompliance. Dr. Meggan Lira also notified and ordered half of sliding scale dose of 6 units Humalog. 6 units Humalog given at 0910. BG Recheck at 0930  274. Dr. Luis Alfredo Sands informed. No additional orders from Dr. Luis Alfredo Sands. Dr. Meggan Lira informed. No additional orders for Insulin prior to colonoscopy. BG to be rechecked in phase 2. Pt arrived via car with family/friend and is oriented to time, place, person and situation. Patient with unsteady gait with cane assistive devices. Visit Vitals  BP (!) 135/92   Pulse 99 Comment: breathing heavy during addmissions   Temp 97.6 °F (36.4 °C)   Resp 22   Ht 5' 4\" (1.626 m)   Wt 86 kg (189 lb 9.6 oz)   SpO2 94%   BMI 32.54 kg/m²       Peripheral IV located on Right hand . Patients belongings are located [facility]. Patient's point of contact is Dwayne Carpenter  and their contact number is: [phone]. They will be in the waiting room. They are able to receive medication information. They will be their ride home.

## 2020-02-28 NOTE — ED TRIAGE NOTES
Pt was a rapid response from pre-op after pt's LOC changed and staff became concerned pt may have had a focal seizure. Pt has a hx of seizures that she is being tx for. Upon arrival in the ED pt was unresponsive except to pain. Pt started to become more alert and is now aware and answering questions. States she feels like she had a seizure. No c/o of pain at this time.

## 2020-02-28 NOTE — ED NOTES
I have reviewed discharge instructions with the patient. The patient verbalized understanding. Pt is A&OX4. Is ambulatory. All questions answered. Pt encouraged to return if s/s worsen. Advised pt on risks of leaving AMA. Pt verbalized understanding.

## 2020-02-28 NOTE — ED NOTES
Pt spouse at bedside. Pt asking for something to drink. Asked her to wait until her testing was done. Pt did not oblige request and spouse gave her Gatorade.

## 2020-02-28 NOTE — H&P
Date of Surgery Update:  Сергей Ward was seen and examined. History and physical has been reviewed. The patient has been examined.  There have been no significant clinical changes since the completion of the originally dated History and Physical.    Signed By: Brandie Beaulieu MD     February 28, 2020 9:43 AM

## 2020-02-28 NOTE — DISCHARGE INSTRUCTIONS
YOU LEAVING AGAINST MY ADVICE. Return immediately if you change your mind. Return immediately if anything changes or worsens. You are stating to me you understand you are going home with the possibility of something serious going on. You state to me you understand that you could have permanent disability and/or death as a result of this. I have recommended you return at any time you change your mind or things worsen. Please return at any time. We need to continue with your work-up here to see what is causing your symptoms. You state understanding. Thank you so much for visiting us today. Please make sure to call your doctor today to be rechecked  today. Return immediately if any fevers, headaches, chest pain, difficulty breathing, abdominal pain, worsening or changing symptoms, or any other concerns. Patient Education        Abdominal Pain: Care Instructions  Your Care Instructions    Abdominal pain has many possible causes. Some aren't serious and get better on their own in a few days. Others need more testing and treatment. If your pain continues or gets worse, you need to be rechecked and may need more tests to find out what is wrong. You may need surgery to correct the problem. Don't ignore new symptoms, such as fever, nausea and vomiting, urination problems, pain that gets worse, and dizziness. These may be signs of a more serious problem. Your doctor may have recommended a follow-up visit in the next 8 to 12 hours. If you are not getting better, you may need more tests or treatment. The doctor has checked you carefully, but problems can develop later. If you notice any problems or new symptoms, get medical treatment right away. Follow-up care is a key part of your treatment and safety. Be sure to make and go to all appointments, and call your doctor if you are having problems. It's also a good idea to know your test results and keep a list of the medicines you take.   How can you care for yourself at home? · Rest until you feel better. · To prevent dehydration, drink plenty of fluids, enough so that your urine is light yellow or clear like water. Choose water and other caffeine-free clear liquids until you feel better. If you have kidney, heart, or liver disease and have to limit fluids, talk with your doctor before you increase the amount of fluids you drink. · If your stomach is upset, eat mild foods, such as rice, dry toast or crackers, bananas, and applesauce. Try eating several small meals instead of two or three large ones. · Wait until 48 hours after all symptoms have gone away before you have spicy foods, alcohol, and drinks that contain caffeine. · Do not eat foods that are high in fat. · Avoid anti-inflammatory medicines such as aspirin, ibuprofen (Advil, Motrin), and naproxen (Aleve). These can cause stomach upset. Talk to your doctor if you take daily aspirin for another health problem. When should you call for help? Call 911 anytime you think you may need emergency care. For example, call if:    · You passed out (lost consciousness).     · You pass maroon or very bloody stools.     · You vomit blood or what looks like coffee grounds.     · You have new, severe belly pain.    Call your doctor now or seek immediate medical care if:    · Your pain gets worse, especially if it becomes focused in one area of your belly.     · You have a new or higher fever.     · Your stools are black and look like tar, or they have streaks of blood.     · You have unexpected vaginal bleeding.     · You have symptoms of a urinary tract infection. These may include:  ? Pain when you urinate. ? Urinating more often than usual.  ? Blood in your urine.     · You are dizzy or lightheaded, or you feel like you may faint.    Watch closely for changes in your health, and be sure to contact your doctor if:    · You are not getting better after 1 day (24 hours). Where can you learn more?   Go to http://lefty-олег.info/. Enter D642 in the search box to learn more about \"Abdominal Pain: Care Instructions. \"  Current as of: June 26, 2019  Content Version: 12.2  © 6455-1328 [employer]. Care instructions adapted under license by [employer] (which disclaims liability or warranty for this information). If you have questions about a medical condition or this instruction, always ask your healthcare professional. [place] [address] any warranty or liability for your use of this information. Patient Education        Learning About High Blood Sugar  What is high blood sugar? Your body turns the food you eat into glucose (sugar), which it uses for energy. But if your body isn't able to use the sugar right away, it can build up in your blood and lead to high blood sugar. When the amount of sugar in your blood stays too high for too much of the time, you may have diabetes. Diabetes is a disease that can cause serious health problems. The good news is that lifestyle changes may help you get your blood sugar back to normal and avoid or delay diabetes. What causes high blood sugar? Sugar (glucose) can build up in your blood if you:  · Are overweight. · Have a family history of diabetes. · Take certain medicines, such as steroids. What are the symptoms? Having high blood sugar may not cause any symptoms at all. Or it may make you feel very thirsty or very hungry. You may also urinate more often than usual, have blurry vision, or lose weight without trying. How is high blood sugar treated? You can take steps to lower your blood sugar level if you understand what makes it get higher. Your doctor may want you to learn how to test your blood sugar level at home. Then you can see how illness, stress, or different kinds of food or medicine raise or lower your blood sugar level. Other tests may be needed to see if you have diabetes.   How can you prevent high blood sugar? · Watch your weight. If you're overweight, losing just a small amount of weight may help. Reducing fat around your waist is most important. · Limit the amount of calories, sweets, and unhealthy fat you eat. Ask your doctor if a dietitian can help you. A registered dietitian can help you create meal plans that fit your lifestyle. · Get at least 30 minutes of exercise on most days of the week. Exercise helps control your blood sugar. It also helps you maintain a healthy weight. Walking is a good choice. You also may want to do other activities, such as running, swimming, cycling, or playing tennis or team sports. · If your doctor prescribed medicines, take them exactly as prescribed. Call your doctor if you think you are having a problem with your medicine. You will get more details on the specific medicines your doctor prescribes. Follow-up care is a key part of your treatment and safety. Be sure to make and go to all appointments, and call your doctor if you are having problems. It's also a good idea to know your test results and keep a list of the medicines you take. Where can you learn more? Go to http://lefty-олег.info/. Enter O108 in the search box to learn more about \"Learning About High Blood Sugar. \"  Current as of: April 16, 2019  Content Version: 12.2  © 1495-1525 [employer]. Care instructions adapted under license by [employer] (which disclaims liability or warranty for this information). If you have questions about a medical condition or this instruction, always ask your healthcare professional. [address] any warranty or liability for your use of this information.

## 2020-02-28 NOTE — ED PROVIDER NOTES
[facility]  EMERGENCY DEPARTMENT HISTORY AND PHYSICAL EXAM       Date: 2/28/2020   Patient Name: Darcy Ford   YOB: 1955  Medical Record Number: [id number]    HISTORY OF PRESENTING ILLNESS:     Darcy Ford is a 71 y.o. female presenting with the noted PMH to the ED c/o altered mental status. Patient was upstairs and outpatient endoscopy suite getting ready to have a routine colonoscopy. However patient started having altered mental status up there. Blood sugar was 240 after receiving 6 units of insulin subcu. Patient denies any pain. She states she has been feeling drowsy since yesterday. She denies any pain. Denies any headache or chest pain. Denies any difficulty breathing. She states she has been coughing but cannot member how long she was coughing for. Denies any sore throat. Denies any nasal congestion. She states she has been having diarrhea since yesterday with the cleanser. It is coming out clear now. She denies any urinary changes. Denies any trauma. Rest of 10 systems reviewed and negative. Primary Care Provider: Cherelle Norton MD   Specialist:    Past Medical History:   Past Medical History:   Diagnosis Date    Diabetes     Dyslipidemia     Hypertension     Hypertension     Noncompliance     Polysubstance abuse ([address]     ETOH,  marijuana    Psychosis ([address]     Pulmonary hypertension     RVSP 60mmHg (ECHO 6/15)    Seizure disorder         Past Surgical History:   Past Surgical History:   Procedure Laterality Date    HX BREAST LUMPECTOMY          Social History:   Social History     Tobacco Use    Smoking status: Current Every Day Smoker     Packs/day: 0.25    Smokeless tobacco: Never Used   Substance Use Topics    Alcohol use: Yes     Comment: occasional beer    Drug use: Yes     Types: Marijuana, Cocaine     Comment: last used marijuanna 2/27/2020        Allergies:    Allergies   Allergen Reactions    Soap Itching    Aspirin Swelling    Penicillins Unknown (comments)        REVIEW OF SYSTEMS:  Review of Systems      PHYSICAL EXAM:  Vitals:    02/28/20 1058 02/28/20 1100 02/28/20 1115 02/28/20 1130   BP: 131/81 126/75 (!) 128/95    Pulse: 78 74 75    Resp: 19 20 24    Temp: 98.2 °F (36.8 °C)      SpO2: 100% 98% 98% 100%       Physical Exam   Vital signs reviewed. Alert in NAD. HEENT: normocephalic atraumatic. Eyes are PERRLA EOMI. Conjunctiva normal.    External ears and nose normal.  multiple missing teeth. Neck: normal external exam. No midline neck or back TTP. Lungs are clear to ascultation bilaterally. normal effort  Heart is regular rate and rhythm with no murmurs. Abdomen soft and diffusely tender. No rebound rigidity or guarding. Extremities: Moves all 4 extremities and no distress. Full range of motion. 2+ pulses and BCR in all 4 extremities. Neuro: Normal gait. 5 out of 5 strength in all 4 extremities. No facial droop. Skin examination: intact. no rashes. No petechia or purpura. Medications   sodium chloride 0.9 % bolus infusion 1,000 mL (has no administration in time range)       RESULTS:    Labs -   Labs Reviewed   CBC WITH AUTOMATED DIFF   METABOLIC PANEL, COMPREHENSIVE   LIPASE   LACTIC ACID   URINALYSIS W/ RFLX MICROSCOPIC       Radiologic Studies -  Xr Chest Port    Result Date: 2/28/2020  EXAM: Portable Chest CLINICAL INDICATION: ams -Additional: Loss of consciousness, possible seizure COMPARISON: None TECHNIQUE: AP portable view at 1117 ______________ FINDINGS: HEART AND MEDIASTINUM: The heart size is normal. LUNGS AND AIRWAYS: The lungs are clear. PLEURA: No pleural effusion or pneumothorax. BONES: No acute or aggressive osseous lesions. OTHER: Multiple cardiac monitor leads overlie the chest. ______________     IMPRESSION: No active cardiopulmonary disease. MEDICAL DECISION MAKING    Patient denied any abdominal pain however she is tender on examination here. Will get a CT of her head and and pelvis to evaluate further. Patient diabetic. Will check blood work and blood sugar here. Patient agrees with plan.  1140. Patient reassessed. Only result we have back his chest x-ray is normal.  We do not have any blood work or CAT scans back. Patient now adamant to nurse and tech that she does not want anything done. She states she needs to go. She states she has a handicapped daughter at home and she cannot stay here any longer.  is at bedside. Discussed with patient and  extensively about that she should stay here and needs to be further worked up.  agrees. However we are unable to convince patient to stay. She is pulling everything off. She is climbing out of bed. She states she cannot stay here any longer. She is able to repeat back to me the risks involved with her going home. She states she understands those risks and she is okay with them. Discussed with her  to make sure to call her doctor today to get an appointment to get rechecked. Also discussed with him that she can come back at any time to be rechecked. We welcome her back with open arms. Had my customary discussion of AMA with patient and . They state understanding. Patient is leaving [address] Diagnosis   Clinical Impression:   1. Hyperglycemia    2. Abdominal pain, generalized           Follow-up Information     Follow up With Specialties Details Why Contact Info    Manoj Castañeda MD Internal Medicine Call today  [address]  [phone]      [facility] EMERGENCY DEPT Emergency Medicine Go today  [address]  [phone]          Current Discharge Medication List          AMA in stable and improved condition. This chart was completed using Dragon, a dictation transcription service. Errors may have resulted from using this device.

## 2020-02-28 NOTE — ED NOTES
Pt is trying to leave with IV in. Asking for clothes and attempting to crawl out of bed. MD at bedside encouraging pt to stay and have testing done to investigate her abd pain. Pt refuses to wait for more tests. Removed monitoring equipment and dc'ed pt's IV.

## 2020-02-28 NOTE — ANESTHESIA PREPROCEDURE EVALUATION
Relevant Problems   No relevant active problems       Anesthetic History   No history of anesthetic complications            Review of Systems / Medical History  Patient summary reviewed, nursing notes reviewed and pertinent labs reviewed    Pulmonary    COPD: mild      Smoker      Comments: Doesn't use any inhalers. Neuro/Psych     seizures: well controlled    Psychiatric history     Cardiovascular              Hyperlipidemia    Exercise tolerance[de-identified] Unable to adequately assess 2/2 gait disturbance. Comments: 2017 ECHO: EF = 55-60%. 2018 ECG: sinus tachycardia noted. GI/Hepatic/Renal               Comments: c/o constipation. Endo/Other    Diabetes: well controlled, type 2    Obesity    Comments: Doesn't take any diabetic medications. Elevated FBS perhaps 2/2 bowel prep? Other Findings   Comments: FBS = 301. Physical Exam    Airway  Mallampati: II  TM Distance: > 6 cm  Neck ROM: normal range of motion   Mouth opening: Normal     Cardiovascular  Regular rate and rhythm,  S1 and S2 normal,  no murmur, click, rub, or gallop  Rhythm: regular  Rate: normal         Dental    Dentition: Poor dentition  Comments: Missing most teeth.    Pulmonary  Breath sounds clear to auscultation               Abdominal  Abdominal exam normal       Other Findings            Anesthetic Plan    ASA: 3  Anesthesia type: MAC          Induction: Intravenous  Anesthetic plan and risks discussed with: Patient

## 2020-05-02 ENCOUNTER — APPOINTMENT (OUTPATIENT)
Dept: CT IMAGING | Age: 65
End: 2020-05-02
Attending: EMERGENCY MEDICINE
Payer: MEDICARE

## 2020-05-02 ENCOUNTER — HOSPITAL ENCOUNTER (EMERGENCY)
Age: 65
Discharge: ANOTHER HOSPITAL | End: 2020-05-02
Attending: EMERGENCY MEDICINE
Payer: MEDICARE

## 2020-05-02 VITALS
SYSTOLIC BLOOD PRESSURE: 148 MMHG | RESPIRATION RATE: 15 BRPM | HEART RATE: 73 BPM | BODY MASS INDEX: 33.97 KG/M2 | HEIGHT: 64 IN | WEIGHT: 199 LBS | OXYGEN SATURATION: 97 % | TEMPERATURE: 98 F | DIASTOLIC BLOOD PRESSURE: 78 MMHG

## 2020-05-02 DIAGNOSIS — R07.9 RIGHT-SIDED CHEST PAIN: ICD-10-CM

## 2020-05-02 DIAGNOSIS — S22.41XA MULTIPLE FRACTURES OF RIBS, RIGHT SIDE, INITIAL ENCOUNTER FOR CLOSED FRACTURE: ICD-10-CM

## 2020-05-02 DIAGNOSIS — R10.11 RUQ ABDOMINAL PAIN: ICD-10-CM

## 2020-05-02 DIAGNOSIS — B88.8 INFESTATION BY BED BUG: ICD-10-CM

## 2020-05-02 DIAGNOSIS — J94.2 HEMOTHORAX ON RIGHT: ICD-10-CM
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
## 2020-05-02 LAB
ALBUMIN SERPL-MCNC: 2.8 G/DL (ref 3.4–5)
ALBUMIN/GLOB SERPL: 0.4 {RATIO} (ref 0.8–1.7)
ALP SERPL-CCNC: 157 U/L (ref 45–117)
ALT SERPL-CCNC: 141 U/L (ref 13–56)
ANION GAP SERPL CALC-SCNC: 2 MMOL/L (ref 3–18)
AST SERPL-CCNC: 97 U/L (ref 10–38)
BASOPHILS # BLD: 0 K/UL (ref 0–0.1)
BASOPHILS NFR BLD: 0 % (ref 0–2)
BILIRUB SERPL-MCNC: 0.4 MG/DL (ref 0.2–1)
BUN SERPL-MCNC: 15 MG/DL (ref 7–18)
BUN/CREAT SERPL: 20 (ref 12–20)
CALCIUM SERPL-MCNC: 9.5 MG/DL (ref 8.5–10.1)
CHLORIDE SERPL-SCNC: 106 MMOL/L (ref 100–111)
CO2 SERPL-SCNC: 27 MMOL/L (ref 21–32)
CREAT SERPL-MCNC: 0.76 MG/DL (ref 0.6–1.3)
DIFFERENTIAL METHOD BLD: ABNORMAL
EOSINOPHIL # BLD: 0.3 K/UL (ref 0–0.4)
EOSINOPHIL NFR BLD: 3 % (ref 0–5)
ERYTHROCYTE [DISTWIDTH] IN BLOOD BY AUTOMATED COUNT: 14.1 % (ref 11.6–14.5)
GLOBULIN SER CALC-MCNC: 6.6 G/DL (ref 2–4)
GLUCOSE SERPL-MCNC: 129 MG/DL (ref 74–99)
HCT VFR BLD AUTO: 42.6 % (ref 35–45)
HGB BLD-MCNC: 14 G/DL (ref 12–16)
INR PPP: 1.1 (ref 0.8–1.2)
LYMPHOCYTES # BLD: 1.4 K/UL (ref 0.9–3.6)
LYMPHOCYTES NFR BLD: 17 % (ref 21–52)
MCH RBC QN AUTO: 32.3 PG (ref 24–34)
MCHC RBC AUTO-ENTMCNC: 32.9 G/DL (ref 31–37)
MCV RBC AUTO: 98.2 FL (ref 74–97)
MONOCYTES # BLD: 0.4 K/UL (ref 0.05–1.2)
MONOCYTES NFR BLD: 5 % (ref 3–10)
NEUTS SEG # BLD: 6.1 K/UL (ref 1.8–8)
NEUTS SEG NFR BLD: 75 % (ref 40–73)
PLATELET # BLD AUTO: 193 K/UL (ref 135–420)
PMV BLD AUTO: 10.6 FL (ref 9.2–11.8)
POTASSIUM SERPL-SCNC: 4.6 MMOL/L (ref 3.5–5.5)
PROT SERPL-MCNC: 9.4 G/DL (ref 6.4–8.2)
PROTHROMBIN TIME: 13.7 SEC (ref 11.5–15.2)
RBC # BLD AUTO: 4.34 M/UL (ref 4.2–5.3)
SODIUM SERPL-SCNC: 135 MMOL/L (ref 136–145)
WBC # BLD AUTO: 8.1 K/UL (ref 4.6–13.2)

## 2020-05-02 PROCEDURE — 71260 CT THORAX DX C+: CPT

## 2020-05-02 PROCEDURE — 80053 COMPREHEN METABOLIC PANEL: CPT

## 2020-05-02 PROCEDURE — 74011636320 HC RX REV CODE- 636/320: Performed by: EMERGENCY MEDICINE

## 2020-05-02 PROCEDURE — 96374 THER/PROPH/DIAG INJ IV PUSH: CPT

## 2020-05-02 PROCEDURE — 99284 EMERGENCY DEPT VISIT MOD MDM: CPT

## 2020-05-02 PROCEDURE — 74011250636 HC RX REV CODE- 250/636: Performed by: EMERGENCY MEDICINE

## 2020-05-02 PROCEDURE — 70450 CT HEAD/BRAIN W/O DYE: CPT

## 2020-05-02 PROCEDURE — 85025 COMPLETE CBC W/AUTO DIFF WBC: CPT

## 2020-05-02 PROCEDURE — 96375 TX/PRO/DX INJ NEW DRUG ADDON: CPT

## 2020-05-02 PROCEDURE — 72125 CT NECK SPINE W/O DYE: CPT

## 2020-05-02 PROCEDURE — 85610 PROTHROMBIN TIME: CPT

## 2020-05-02 RX ORDER — MORPHINE SULFATE 4 MG/ML
4 INJECTION, SOLUTION INTRAMUSCULAR; INTRAVENOUS
Status: COMPLETED | OUTPATIENT
Start: 2020-05-02 | End: 2020-05-02

## 2020-05-02 RX ORDER — ONDANSETRON 2 MG/ML
4 INJECTION INTRAMUSCULAR; INTRAVENOUS
Status: COMPLETED | OUTPATIENT
Start: 2020-05-02 | End: 2020-05-02

## 2020-05-02 RX ADMIN — MORPHINE SULFATE 4 MG: 4 INJECTION, SOLUTION INTRAMUSCULAR; INTRAVENOUS at 20:59

## 2020-05-02 RX ADMIN — ONDANSETRON 4 MG: 2 INJECTION INTRAMUSCULAR; INTRAVENOUS at 21:00

## 2020-05-02 RX ADMIN — IOPAMIDOL 100 ML: 612 INJECTION, SOLUTION INTRAVENOUS at 19:31

## 2020-05-02 NOTE — ED NOTES
7:17 PM  Care of patient awaiting CT results. 9:03 PM  CT shows nondisplaced fractures of ribs 4 through 7 on the right adjacent subpleural hematoma no evidence of pneumothorax age-indeterminate compression deformities at T11 and T10    CT head CT neck negative in the emergency department      9:13 PM  Discussed with Dr. Doe martinez will accept trauma patient [facility]    Diagnosis:   1. Fall, initial encounter    2. RUQ abdominal pain    3. Right-sided chest pain    4. Infestation by bed bug          Disposition: transfer [facility]    Follow-up Information     Follow up With Specialties Details Why Contact Info    Manoj Santillan MD Internal Medicine Schedule an appointment as soon as possible for a visit in 2 days  [address] [facility] EMERGENCY DEPT Emergency Medicine  If symptoms worsen return immediately [address] [place]  [phone]          Patient's Medications   Start Taking    No medications on file   Continue Taking    ADALIMUMAB 40 MG/0.8 ML PNKT    40 mg by SubCUTAneous route Once every 2 weeks. Indications: RHEUMATOID ARTHRITIS    ATORVASTATIN (LIPITOR) 20 MG TABLET    Take 1 Tab by mouth nightly. ATORVASTATIN (LIPITOR) 20 MG TABLET    Take 20 mg by mouth daily. CALCIUM CARB/VIT D3/MINERALS (CALCIUM-VITAMIN D PO)    Take 1,200 mg by mouth daily. CHLORPROMAZINE (THORAZINE) 50 MG TABLET    Take 25 mg by mouth two (2) times a day. CHOLECALCIFEROL, VITAMIN D3, (VITAMIN D3) 2,000 UNIT TAB    Take 2,000 Int'l Units by mouth daily. DOCUSATE SODIUM (COLACE) 100 MG CAPSULE    Take 100 mg by mouth two (2) times a day. FAMOTIDINE (PEPCID) 20 MG TABLET    Take 20 mg by mouth two (2) times a day. FOLIC ACID (FOLVITE) 1 MG TABLET    Take 1 mg by mouth daily. GABAPENTIN (NEURONTIN) 100 MG CAPSULE    Take 100 mg by mouth three (3) times daily.     LEVETIRACETAM (KEPPRA) 750 MG TABLET    Take 2 Tabs by mouth two (2) times a day. LORATADINE (CLARITIN) 10 MG TABLET    Take 10 mg by mouth daily. MIRTAZAPINE (REMERON) 15 MG TABLET    Take 15 mg by mouth nightly. OLANZAPINE (ZYPREXA) 5 MG TABLET    Take 15 mg by mouth nightly. OMEGA-3 FATTY ACIDS-FISH -1,200 MG CPDR    Take 1,200 mg by mouth daily. ONDANSETRON HCL (ZOFRAN, AS HYDROCHLORIDE,) 4 MG TABLET    Take 1 Tab by mouth every eight (8) hours as needed for Nausea. PHENOL THROAT SPRAY (SORE THROAT, PHENOL,) 1.4 % SPRAY    Take 1 Spray by mouth as needed for Sore throat or pain/local anesthesia. VENLAFAXINE (EFFEXOR) 75 MG TABLET    Take 75 mg by mouth daily. VENLAFAXINE-SR (EFFEXOR-XR) 150 MG CAPSULE    Take 150 mg by mouth daily.    These Medications have changed    No medications on file   Stop Taking    No medications on file

## 2020-05-02 NOTE — ED TRIAGE NOTES
Day before yesterday fell on right side. C/o right side and stomach hurting.  Walks with cane due to AMS

## 2020-05-02 NOTE — ED PROVIDER NOTES
EMERGENCY DEPARTMENT HISTORY AND PHYSICAL EXAM    Date: 5/2/2020  Patient Name: Estefana Goodell    History of Presenting Illness     Chief Complaint   Patient presents with    Fall         History Provided By: Patient      Additional History (Context): Estefana Goodell is a 71 y.o. female with diabetes, hypertension, hyperlipidemia, obesity, seizure and polysubstance abuse; medication noncompliance who presents with pain to her upper right abdomen right chest since falling off the sofa yesterday. Hit her head. Is also complaining of neck pain. Denies LOC or vomiting. Denies any anticoagulation but she is not sure. Denies any premonitory dizziness lightheadedness shortness of breath or chest pain. PCP: Beto Varela MD    Current Facility-Administered Medications   Medication Dose Route Frequency Provider Last Rate Last Dose    morphine injection 4 mg  4 mg IntraVENous NOW Brianna Chen PA        ondansetron (ZOFRAN) injection 4 mg  4 mg IntraVENous NOW Brianna Chen PA         Current Outpatient Medications   Medication Sig Dispense Refill    levETIRAcetam (KEPPRA) 750 mg tablet Take 2 Tabs by mouth two (2) times a day. 120 Tab 0    venlafaxine (EFFEXOR) 75 mg tablet Take 75 mg by mouth daily.  mirtazapine (REMERON) 15 mg tablet Take 15 mg by mouth nightly.  cholecalciferol, vitamin D3, (VITAMIN D3) 2,000 unit tab Take 2,000 Int'l Units by mouth daily.  docusate sodium (COLACE) 100 mg capsule Take 100 mg by mouth two (2) times a day.  phenol throat spray (SORE THROAT, PHENOL,) 1.4 % spray Take 1 Spray by mouth as needed for Sore throat or pain/local anesthesia.  adalimumab 40 mg/0.8 mL pnkt 40 mg by SubCUTAneous route Once every 2 weeks. Indications: RHEUMATOID ARTHRITIS      Omega-3 Fatty Acids-Fish Oil 360-1,200 mg cpDR Take 1,200 mg by mouth daily.  CALCIUM CARB/VIT D3/MINERALS (CALCIUM-VITAMIN D PO) Take 1,200 mg by mouth daily.       gabapentin (NEURONTIN) 100 mg capsule Take 100 mg by mouth three (3) times daily.  chlorproMAZINE (THORAZINE) 50 mg tablet Take 25 mg by mouth two (2) times a day.  venlafaxine-SR (EFFEXOR-XR) 150 mg capsule Take 150 mg by mouth daily.  atorvastatin (LIPITOR) 20 mg tablet Take 20 mg by mouth daily.  OLANZapine (ZYPREXA) 5 mg tablet Take 15 mg by mouth nightly.  atorvastatin (LIPITOR) 20 mg tablet Take 1 Tab by mouth nightly. 30 Tab 0    ondansetron hcl (ZOFRAN, AS HYDROCHLORIDE,) 4 mg tablet Take 1 Tab by mouth every eight (8) hours as needed for Nausea. 30 Tab 0    loratadine (CLARITIN) 10 mg tablet Take 10 mg by mouth daily.  famotidine (PEPCID) 20 mg tablet Take 20 mg by mouth two (2) times a day.  folic acid (FOLVITE) 1 mg tablet Take 1 mg by mouth daily. Past History     Past Medical History:  Past Medical History:   Diagnosis Date    Diabetes     Dyslipidemia     Hypertension     Hypertension     Noncompliance     Polysubstance abuse ([facility] [address]     ETOH,  marijuana    Psychosis ([facility] [address]     Pulmonary hypertension     RVSP 60mmHg (ECHO 6/15)    Seizure disorder        Past Surgical History:  Past Surgical History:   Procedure Laterality Date    HX BREAST LUMPECTOMY         Family History:  History reviewed. No pertinent family history. Social History:  Social History     Tobacco Use    Smoking status: Current Every Day Smoker     Packs/day: 0.25    Smokeless tobacco: Never Used   Substance Use Topics    Alcohol use: Yes     Comment: occasional beer    Drug use: Yes     Types: Marijuana, Cocaine     Comment: last used [employer] 2/27/2020       Allergies: Allergies   Allergen Reactions    Soap Itching    Aspirin Swelling    Penicillins Unknown (comments)         Review of Systems   Review of Systems   Constitutional: Negative for fever. Respiratory: Negative for cough and shortness of breath. Cardiovascular: Positive for chest pain.    Gastrointestinal: Positive for abdominal pain. Negative for diarrhea, nausea and vomiting. Musculoskeletal: Positive for neck pain. Skin: Negative for color change and wound. Neurological: Negative for syncope. Hematological: Does not bruise/bleed easily. All Other Systems Negative  Physical Exam     Vitals:    05/02/20 1318   BP: (!) 134/91   Pulse: 73   Resp: 15   Temp: 98 °F (36.7 °C)   SpO2: 100%   Weight: 90.3 kg (199 lb)   Height: 5' 4\" (1.626 m)     Physical Exam  Vitals signs and nursing note reviewed. Constitutional:       General: She is in acute distress. Appearance: She is well-developed. She is obese. HENT:      Head: Normocephalic and atraumatic. Eyes:      Pupils: Pupils are equal, round, and reactive to light. Neck:      Musculoskeletal: Muscular tenderness present. Thyroid: No thyromegaly. Vascular: No JVD. Trachea: No tracheal deviation. Comments: Inferior C-spine tenderness to palpation. Cardiovascular:      Rate and Rhythm: Normal rate and regular rhythm. Heart sounds: Normal heart sounds. No murmur. No friction rub. No gallop. Pulmonary:      Effort: Pulmonary effort is normal. No respiratory distress. Breath sounds: Normal breath sounds. No stridor. No wheezing or rales. Chest:      Chest wall: Tenderness present. Abdominal:      General: There is no distension. Palpations: Abdomen is soft. There is no mass. Tenderness: There is abdominal tenderness. There is no guarding or rebound. Comments: Right inferior chest inferior right upper abdominal tenderness to palpation. Musculoskeletal:         General: No tenderness. Lymphadenopathy:      Cervical: No cervical adenopathy. Skin:     General: Skin is warm and dry. Coloration: Skin is not pale. Findings: No erythema or rash. Neurological:      Mental Status: She is alert and oriented to person, place, and time.    Psychiatric:         Behavior: Behavior normal.         Thought Content: Thought content normal.          Diagnostic Study Results     Labs -     Recent Results (from the past 12 hour(s))   CBC WITH AUTOMATED DIFF    Collection Time: 05/02/20  2:18 PM   Result Value Ref Range    WBC 8.1 4.6 - 13.2 K/uL    RBC 4.34 4.20 - 5.30 M/uL    HGB 14.0 12.0 - 16.0 g/dL    HCT 42.6 35.0 - 45.0 %    MCV 98.2 (H) 74.0 - 97.0 FL    MCH 32.3 24.0 - 34.0 PG    MCHC 32.9 31.0 - 37.0 g/dL    RDW 14.1 11.6 - 14.5 %    PLATELET 615 386 - 157 K/uL    MPV 10.6 9.2 - 11.8 FL    NEUTROPHILS 75 (H) 40 - 73 %    LYMPHOCYTES 17 (L) 21 - 52 %    MONOCYTES 5 3 - 10 %    EOSINOPHILS 3 0 - 5 %    BASOPHILS 0 0 - 2 %    ABS. NEUTROPHILS 6.1 1.8 - 8.0 K/UL    ABS. LYMPHOCYTES 1.4 0.9 - 3.6 K/UL    ABS. MONOCYTES 0.4 0.05 - 1.2 K/UL    ABS. EOSINOPHILS 0.3 0.0 - 0.4 K/UL    ABS. BASOPHILS 0.0 0.0 - 0.1 K/UL    DF AUTOMATED     PROTHROMBIN TIME + INR    Collection Time: 05/02/20  3:03 PM   Result Value Ref Range    Prothrombin time 13.7 11.5 - 15.2 sec    INR 1.1 0.8 - 1.2     METABOLIC PANEL, COMPREHENSIVE    Collection Time: 05/02/20  4:23 PM   Result Value Ref Range    Sodium 135 (L) 136 - 145 mmol/L    Potassium 4.6 3.5 - 5.5 mmol/L    Chloride 106 100 - 111 mmol/L    CO2 27 21 - 32 mmol/L    Anion gap 2 (L) 3.0 - 18 mmol/L    Glucose 129 (H) 74 - 99 mg/dL    BUN 15 7.0 - 18 MG/DL    Creatinine 0.76 0.6 - 1.3 MG/DL    BUN/Creatinine ratio 20 12 - 20      GFR est AA >60 >60 ml/min/1.73m2    GFR est non-AA >60 >60 ml/min/1.73m2    Calcium 9.5 8.5 - 10.1 MG/DL    Bilirubin, total 0.4 0.2 - 1.0 MG/DL    ALT (SGPT) 141 (H) 13 - 56 U/L    AST (SGOT) 97 (H) 10 - 38 U/L    Alk.  phosphatase 157 (H) 45 - 117 U/L    Protein, total 9.4 (H) 6.4 - 8.2 g/dL    Albumin 2.8 (L) 3.4 - 5.0 g/dL    Globulin 6.6 (H) 2.0 - 4.0 g/dL    A-G Ratio 0.4 (L) 0.8 - 1.7         Radiologic Studies -   CT HEAD WO CONT    (Results Pending)   CT SPINE CERV WO CONT    (Results Pending)   CT CHEST ABD PELV W CONT    (Results Pending)     CT Results  (Last 48 hours)    None        CXR Results  (Last 48 hours)    None            Medical Decision Making   I am the first provider for this patient. I reviewed the vital signs, available nursing notes, past medical history, past surgical history, family history and social history. Vital Signs-Reviewed the patient's vital signs. Procedures:  Procedures    Provider Notes (Medical Decision Making): Check labs and scan. Because of COVID, pt has been unable to get her CT scan; more critical pts have superseded her scan and and there is decontamination him after this. Patient also has visible bedbugs. Throughout her whole stay, patient has been asleep and snoring. In no distress. Suspect she was smoking marijuana prior to arrival.    MED RECONCILIATION:  Current Facility-Administered Medications   Medication Dose Route Frequency    morphine injection 4 mg  4 mg IntraVENous NOW    ondansetron (ZOFRAN) injection 4 mg  4 mg IntraVENous NOW     Current Outpatient Medications   Medication Sig    levETIRAcetam (KEPPRA) 750 mg tablet Take 2 Tabs by mouth two (2) times a day.  venlafaxine (EFFEXOR) 75 mg tablet Take 75 mg by mouth daily.  mirtazapine (REMERON) 15 mg tablet Take 15 mg by mouth nightly.  cholecalciferol, vitamin D3, (VITAMIN D3) 2,000 unit tab Take 2,000 Int'l Units by mouth daily.  docusate sodium (COLACE) 100 mg capsule Take 100 mg by mouth two (2) times a day.  phenol throat spray (SORE THROAT, PHENOL,) 1.4 % spray Take 1 Spray by mouth as needed for Sore throat or pain/local anesthesia.  adalimumab 40 mg/0.8 mL pnkt 40 mg by SubCUTAneous route Once every 2 weeks. Indications: RHEUMATOID ARTHRITIS    Omega-3 Fatty Acids-Fish Oil 360-1,200 mg cpDR Take 1,200 mg by mouth daily.  CALCIUM CARB/VIT D3/MINERALS (CALCIUM-VITAMIN D PO) Take 1,200 mg by mouth daily.  gabapentin (NEURONTIN) 100 mg capsule Take 100 mg by mouth three (3) times daily.     chlorproMAZINE (THORAZINE) 50 mg tablet Take 25 mg by mouth two (2) times a day.  venlafaxine-SR (EFFEXOR-XR) 150 mg capsule Take 150 mg by mouth daily.  atorvastatin (LIPITOR) 20 mg tablet Take 20 mg by mouth daily.  OLANZapine (ZYPREXA) 5 mg tablet Take 15 mg by mouth nightly.  atorvastatin (LIPITOR) 20 mg tablet Take 1 Tab by mouth nightly.  ondansetron hcl (ZOFRAN, AS HYDROCHLORIDE,) 4 mg tablet Take 1 Tab by mouth every eight (8) hours as needed for Nausea.  loratadine (CLARITIN) 10 mg tablet Take 10 mg by mouth daily.  famotidine (PEPCID) 20 mg tablet Take 20 mg by mouth two (2) times a day.  folic acid (FOLVITE) 1 mg tablet Take 1 mg by mouth daily. Disposition:  TBD    Follow-up Information     Follow up With Specialties Details Why Contact Info    aMnoj Sarabia MD Internal Medicine Schedule an appointment as soon as possible for a visit in 2 days  [address] [facility] EMERGENCY DEPT Emergency Medicine  If symptoms worsen return immediately [address]  [phone]          Current Discharge Medication List            Diagnosis     Clinical Impression:   1. Fall, initial encounter    2. RUQ abdominal pain    3. Right-sided chest pain    4.  Infestation by bed bug

## 2020-10-23 NOTE — PROGRESS NOTES
Anesthesia Evaluation     Patient summary reviewed and Nursing notes reviewed   NPO Solid Status: > 8 hours  NPO Liquid Status: > 8 hours           Airway   Mallampati: II  TM distance: >3 FB  Neck ROM: full  No difficulty expected  Dental - normal exam     Pulmonary - normal exam   (+) sleep apnea on CPAP,   Cardiovascular - normal exam    (+) hypertension, valvular problems/murmurs, hyperlipidemia,       Neuro/Psych- negative ROS  GI/Hepatic/Renal/Endo    (+)   hepatitis C, diabetes mellitus,     Musculoskeletal (-) negative ROS    Abdominal  - normal exam    Bowel sounds: normal.   Substance History - negative use     OB/GYN negative ob/gyn ROS         Other        ROS/Med Hx Other: pULM htn                  Anesthesia Plan    ASA 3     MAC     intravenous induction     Anesthetic plan, all risks, benefits, and alternatives have been provided, discussed and informed consent has been obtained with: patient.       1915Bedside and Verbal shift change report given to Sylwia Fry (oncoming nurse) by  Nichole Yo RN (offgoing nurse). Report included the following information SBAR, Kardex, MAR and Recent Results. 2040 shift assessment done,patient fell into a trance like state for 60 secs,unresponsive with eyes staring into space,goes right back to sleep after episode and is not arousable until about 5 mins,patient wakes up alert and able to have meaningful interaction    2110 patient has had about 3 0f the above described episode    2308 patient agitated trying to get out of bed ,sitter placed in patients room MD paged Iv 1 mg ativan ordered once    0153 patient still agitated and had a seizure for about 45 secs 2 mg ativan every 5 minutes upto 3 doses,2mg haldol also orderd IV to be given slowly over 2-3 mins    0222 paged respiratory ,patient wheezy out of breath ,given 2 l/min ,paged physician ,waiting for   Md to return page. 0230 respiratory assess patient is a mouth breather and wheezing has subsided,patient soundly sleeping    0300  Patient soundly sleeping , no signs of distress noted will continue to monitor      1900 Bedside and Verbal shift change report given to Cary Burciaga (oncoming nurse) by Yaz Lowe RN (offgoing nurse). Report included the following information SBAR, Kardex, MAR and Recent Results.

## 2020-12-17 ENCOUNTER — APPOINTMENT (OUTPATIENT)
Dept: GENERAL RADIOLOGY | Age: 65
DRG: 682 | End: 2020-12-17
Attending: EMERGENCY MEDICINE
Payer: MEDICARE

## 2020-12-17 ENCOUNTER — HOSPITAL ENCOUNTER (INPATIENT)
Age: 65
LOS: 3 days | Discharge: HOME HEALTH | DRG: 682 | End: 2020-12-20
Attending: EMERGENCY MEDICINE | Admitting: HOSPITALIST
Payer: MEDICARE

## 2020-12-17 ENCOUNTER — APPOINTMENT (OUTPATIENT)
Dept: CT IMAGING | Age: 65
DRG: 682 | End: 2020-12-17
Attending: EMERGENCY MEDICINE
Payer: MEDICARE

## 2020-12-17 DIAGNOSIS — N17.9 AKI (ACUTE KIDNEY INJURY) (HCC): ICD-10-CM

## 2020-12-17 DIAGNOSIS — R41.82 ALTERED MENTAL STATUS, UNSPECIFIED ALTERED MENTAL STATUS TYPE: Primary | ICD-10-CM

## 2020-12-17 PROBLEM — A41.9 SEPSIS (HCC): Status: ACTIVE | Noted: 2020-12-17

## 2020-12-17 LAB
ALBUMIN SERPL-MCNC: 3.9 G/DL (ref 3.4–5)
ALBUMIN/GLOB SERPL: 0.5 {RATIO} (ref 0.8–1.7)
ALP SERPL-CCNC: 113 U/L (ref 45–117)
ALT SERPL-CCNC: 70 U/L (ref 13–56)
AMMONIA PLAS-SCNC: 61 UMOL/L (ref 11–32)
AMPHET UR QL SCN: NEGATIVE
ANION GAP SERPL CALC-SCNC: 13 MMOL/L (ref 3–18)
APPEARANCE UR: CLEAR
AST SERPL-CCNC: 60 U/L (ref 10–38)
BACTERIA URNS QL MICRO: NEGATIVE /HPF
BARBITURATES UR QL SCN: NEGATIVE
BASOPHILS # BLD: 0 K/UL (ref 0–0.1)
BASOPHILS NFR BLD: 0 % (ref 0–2)
BENZODIAZ UR QL: NEGATIVE
BILIRUB DIRECT SERPL-MCNC: 0.2 MG/DL (ref 0–0.2)
BILIRUB SERPL-MCNC: 0.4 MG/DL (ref 0.2–1)
BILIRUB UR QL: NEGATIVE
BNP SERPL-MCNC: 114 PG/ML (ref 0–900)
BUN SERPL-MCNC: 107 MG/DL (ref 7–18)
BUN/CREAT SERPL: 17 (ref 12–20)
CALCIUM SERPL-MCNC: 10.5 MG/DL (ref 8.5–10.1)
CANNABINOIDS UR QL SCN: POSITIVE
CHLORIDE SERPL-SCNC: 106 MMOL/L (ref 100–111)
CO2 SERPL-SCNC: 17 MMOL/L (ref 21–32)
COCAINE UR QL SCN: NEGATIVE
COLOR UR: YELLOW
CREAT SERPL-MCNC: 6.22 MG/DL (ref 0.6–1.3)
DIFFERENTIAL METHOD BLD: ABNORMAL
EOSINOPHIL # BLD: 0.1 K/UL (ref 0–0.4)
EOSINOPHIL NFR BLD: 1 % (ref 0–5)
EPITH CASTS URNS QL MICRO: NORMAL /LPF (ref 0–5)
ERYTHROCYTE [DISTWIDTH] IN BLOOD BY AUTOMATED COUNT: 13.6 % (ref 11.6–14.5)
ETHANOL SERPL-MCNC: <3 MG/DL (ref 0–3)
GLOBULIN SER CALC-MCNC: 7.9 G/DL (ref 2–4)
GLUCOSE SERPL-MCNC: 133 MG/DL (ref 74–99)
GLUCOSE UR STRIP.AUTO-MCNC: 100 MG/DL
HCT VFR BLD AUTO: 48.7 % (ref 35–45)
HDSCOM,HDSCOM: ABNORMAL
HGB BLD-MCNC: 15.8 G/DL (ref 12–16)
HGB UR QL STRIP: NEGATIVE
INR PPP: 1.2 (ref 0.8–1.2)
KETONES UR QL STRIP.AUTO: ABNORMAL MG/DL
LACTATE BLD-SCNC: 1.67 MMOL/L (ref 0.4–2)
LEUKOCYTE ESTERASE UR QL STRIP.AUTO: NEGATIVE
LIPASE SERPL-CCNC: 143 U/L (ref 73–393)
LYMPHOCYTES # BLD: 1.8 K/UL (ref 0.9–3.6)
LYMPHOCYTES NFR BLD: 18 % (ref 21–52)
MAGNESIUM SERPL-MCNC: 3.7 MG/DL (ref 1.6–2.6)
MCH RBC QN AUTO: 32.8 PG (ref 24–34)
MCHC RBC AUTO-ENTMCNC: 32.4 G/DL (ref 31–37)
MCV RBC AUTO: 101 FL (ref 74–97)
METHADONE UR QL: NEGATIVE
MONOCYTES # BLD: 0.6 K/UL (ref 0.05–1.2)
MONOCYTES NFR BLD: 6 % (ref 3–10)
NEUTS SEG # BLD: 7.6 K/UL (ref 1.8–8)
NEUTS SEG NFR BLD: 75 % (ref 40–73)
NITRITE UR QL STRIP.AUTO: NEGATIVE
OPIATES UR QL: NEGATIVE
PCP UR QL: NEGATIVE
PH UR STRIP: 5 [PH] (ref 5–8)
PLATELET # BLD AUTO: 218 K/UL (ref 135–420)
PMV BLD AUTO: 10.3 FL (ref 9.2–11.8)
POTASSIUM SERPL-SCNC: 4.7 MMOL/L (ref 3.5–5.5)
PROT SERPL-MCNC: 11.8 G/DL (ref 6.4–8.2)
PROT UR STRIP-MCNC: 100 MG/DL
PROTHROMBIN TIME: 14.9 SEC (ref 11.5–15.2)
RBC # BLD AUTO: 4.82 M/UL (ref 4.2–5.3)
RBC #/AREA URNS HPF: NORMAL /HPF (ref 0–5)
SODIUM SERPL-SCNC: 136 MMOL/L (ref 136–145)
SP GR UR REFRACTOMETRY: 1.02 (ref 1–1.03)
TROPONIN I SERPL-MCNC: <0.02 NG/ML (ref 0–0.04)
UROBILINOGEN UR QL STRIP.AUTO: 0.2 EU/DL (ref 0.2–1)
WBC # BLD AUTO: 10.1 K/UL (ref 4.6–13.2)
WBC URNS QL MICRO: NORMAL /HPF (ref 0–4)

## 2020-12-17 PROCEDURE — 99285 EMERGENCY DEPT VISIT HI MDM: CPT

## 2020-12-17 PROCEDURE — 87186 SC STD MICRODIL/AGAR DIL: CPT

## 2020-12-17 PROCEDURE — 83605 ASSAY OF LACTIC ACID: CPT

## 2020-12-17 PROCEDURE — 87077 CULTURE AEROBIC IDENTIFY: CPT

## 2020-12-17 PROCEDURE — 81001 URINALYSIS AUTO W/SCOPE: CPT

## 2020-12-17 PROCEDURE — 87086 URINE CULTURE/COLONY COUNT: CPT

## 2020-12-17 PROCEDURE — 85025 COMPLETE CBC W/AUTO DIFF WBC: CPT

## 2020-12-17 PROCEDURE — 83735 ASSAY OF MAGNESIUM: CPT

## 2020-12-17 PROCEDURE — 74011250636 HC RX REV CODE- 250/636: Performed by: EMERGENCY MEDICINE

## 2020-12-17 PROCEDURE — 70450 CT HEAD/BRAIN W/O DYE: CPT

## 2020-12-17 PROCEDURE — 87040 BLOOD CULTURE FOR BACTERIA: CPT

## 2020-12-17 PROCEDURE — 80076 HEPATIC FUNCTION PANEL: CPT

## 2020-12-17 PROCEDURE — 65270000029 HC RM PRIVATE

## 2020-12-17 PROCEDURE — 51702 INSERT TEMP BLADDER CATH: CPT

## 2020-12-17 PROCEDURE — 96366 THER/PROPH/DIAG IV INF ADDON: CPT

## 2020-12-17 PROCEDURE — 83690 ASSAY OF LIPASE: CPT

## 2020-12-17 PROCEDURE — 80307 DRUG TEST PRSMV CHEM ANLYZR: CPT

## 2020-12-17 PROCEDURE — 83880 ASSAY OF NATRIURETIC PEPTIDE: CPT

## 2020-12-17 PROCEDURE — 84484 ASSAY OF TROPONIN QUANT: CPT

## 2020-12-17 PROCEDURE — 93005 ELECTROCARDIOGRAM TRACING: CPT

## 2020-12-17 PROCEDURE — 80177 DRUG SCRN QUAN LEVETIRACETAM: CPT

## 2020-12-17 PROCEDURE — 85610 PROTHROMBIN TIME: CPT

## 2020-12-17 PROCEDURE — 71045 X-RAY EXAM CHEST 1 VIEW: CPT

## 2020-12-17 PROCEDURE — 74011250637 HC RX REV CODE- 250/637: Performed by: EMERGENCY MEDICINE

## 2020-12-17 PROCEDURE — 80048 BASIC METABOLIC PNL TOTAL CA: CPT

## 2020-12-17 PROCEDURE — 82140 ASSAY OF AMMONIA: CPT

## 2020-12-17 PROCEDURE — 71250 CT THORAX DX C-: CPT

## 2020-12-17 PROCEDURE — 96365 THER/PROPH/DIAG IV INF INIT: CPT

## 2020-12-17 PROCEDURE — 96368 THER/DIAG CONCURRENT INF: CPT

## 2020-12-17 PROCEDURE — 77030005513 HC CATH URETH FOL11 MDII -B

## 2020-12-17 RX ORDER — SULFAMETHOXAZOLE AND TRIMETHOPRIM 800; 160 MG/1; MG/1
1 TABLET ORAL 2 TIMES DAILY
COMMUNITY
End: 2020-12-20

## 2020-12-17 RX ORDER — ERGOCALCIFEROL 1.25 MG/1
50000 CAPSULE ORAL
COMMUNITY

## 2020-12-17 RX ORDER — GLIMEPIRIDE 1 MG/1
1 TABLET ORAL
COMMUNITY

## 2020-12-17 RX ORDER — LEVOFLOXACIN 5 MG/ML
750 INJECTION, SOLUTION INTRAVENOUS
Status: COMPLETED | OUTPATIENT
Start: 2020-12-17 | End: 2020-12-17

## 2020-12-17 RX ORDER — METFORMIN HYDROCHLORIDE 500 MG/1
500 TABLET ORAL 2 TIMES DAILY WITH MEALS
COMMUNITY

## 2020-12-17 RX ORDER — SULFASALAZINE 500 MG/1
500 TABLET ORAL 2 TIMES DAILY
COMMUNITY

## 2020-12-17 RX ORDER — VANCOMYCIN 2 GRAM/500 ML IN 0.9 % SODIUM CHLORIDE INTRAVENOUS
2000 ONCE
Status: COMPLETED | OUTPATIENT
Start: 2020-12-17 | End: 2020-12-17

## 2020-12-17 RX ORDER — TOCILIZUMAB 180 MG/ML
162 INJECTION, SOLUTION SUBCUTANEOUS ONCE
COMMUNITY

## 2020-12-17 RX ORDER — ACETAMINOPHEN 650 MG/1
650 SUPPOSITORY RECTAL
Status: COMPLETED | OUTPATIENT
Start: 2020-12-17 | End: 2020-12-17

## 2020-12-17 RX ORDER — THERA TABS 400 MCG
1 TAB ORAL DAILY
COMMUNITY

## 2020-12-17 RX ORDER — OXCARBAZEPINE 150 MG/1
150 TABLET, FILM COATED ORAL 2 TIMES DAILY
COMMUNITY

## 2020-12-17 RX ORDER — LANOLIN ALCOHOL/MO/W.PET/CERES
100 CREAM (GRAM) TOPICAL DAILY
COMMUNITY

## 2020-12-17 RX ORDER — VENLAFAXINE HYDROCHLORIDE 150 MG/1
150 TABLET, EXTENDED RELEASE ORAL DAILY
COMMUNITY

## 2020-12-17 RX ADMIN — VANCOMYCIN HYDROCHLORIDE 2000 MG: 10 INJECTION, POWDER, LYOPHILIZED, FOR SOLUTION INTRAVENOUS at 17:05

## 2020-12-17 RX ADMIN — SODIUM CHLORIDE 1000 ML: 900 INJECTION, SOLUTION INTRAVENOUS at 17:05

## 2020-12-17 RX ADMIN — LEVOFLOXACIN 750 MG: 5 INJECTION, SOLUTION INTRAVENOUS at 17:06

## 2020-12-17 RX ADMIN — ACETAMINOPHEN 650 MG: 650 SUPPOSITORY RECTAL at 17:06

## 2020-12-17 NOTE — ED NOTES
WOUNDS  Patient has ulcerated wounds on bilat upper thighs and under pannus. They are weeping , deep and have pus looking material in some of the ulcers.   Washed areas well, applied wet to dry dressing and wrapped each leg with gauze, applied mepilex under abdominal pannus

## 2020-12-17 NOTE — ED TRIAGE NOTES
Per the family, the patient does not want to be in the hospital,  is willing to come in and stay with her, patient does have some mental health issues

## 2020-12-17 NOTE — ED NOTES
Spoke with  on phone, states patient was seen by PCP on Monday for the \"rash on her legs\" and blood work. States patient has not eaten in one week only popsicles but he never asked her why. He states she fell last week (he thinks) but she did not hit her head. Walks sometimes with a cane or a walker.  denies nausea, vomiting, diarrhea or kidney problems. When asked about the wounds on her legs, he says it is a rash at least that is what he has seen when he washes her up. Also, he is the one who gives her her medications. There is a box of medications at the bedside, however, I have let him know that I will not be looking in it due to bed bugs that were seen on the patients body and bed.

## 2020-12-17 NOTE — ED PROVIDER NOTES
50-year-old female limited history and review of systems due to altered mental status    Report from EMS altered mental status and febrile illness patient brought to the ER    The history is limited by the condition of the patient. Past Medical History:   Diagnosis Date    Diabetes     Dyslipidemia     Hypertension     Hypertension     Noncompliance     Polysubstance abuse ([facility] [address]     ETOH,  marijuana    Psychosis ([facility] [address]     Pulmonary hypertension     RVSP 60mmHg (ECHO 6/15)    Seizure disorder        Past Surgical History:   Procedure Laterality Date    HX BREAST LUMPECTOMY           No family history on file.     Social History     Socioeconomic History    Marital status:      Spouse name: Not on file    Number of children: Not on file    Years of education: Not on file    Highest education level: Not on file   Occupational History    Not on file   Social Needs    Financial resource strain: Not on file    Food insecurity     Worry: Not on file     Inability: Not on file    Transportation needs     Medical: Not on file     Non-medical: Not on file   Tobacco Use    Smoking status: Current Every Day Smoker     Packs/day: 0.25    Smokeless tobacco: Never Used   Substance and Sexual Activity    Alcohol use: Yes     Comment: occasional beer    Drug use: Yes     Types: Marijuana, Cocaine     Comment: last used marijuanna 2/27/2020    Sexual activity: Not on file   Lifestyle    Physical activity     Days per week: Not on file     Minutes per session: Not on file    Stress: Not on file   Relationships    Social connections     Talks on phone: Not on file     Gets together: Not on file     Attends Jewish service: Not on file     Active member of club or organization: Not on file     Attends meetings of clubs or organizations: Not on file     Relationship status: Not on file    Intimate partner violence     Fear of current or ex partner: Not on file     Emotionally abused: Not on file Physically abused: Not on file     Forced sexual activity: Not on file   Other Topics Concern    Not on file   Social History Narrative    Not on file         ALLERGIES: Soap, Aspirin, and Penicillins    Review of Systems   Unable to perform ROS: Mental status change       There were no vitals filed for this visit. Physical Exam  Vitals signs and nursing note reviewed. Constitutional:       General: She is not in acute distress. Appearance: She is obese. She is ill-appearing and toxic-appearing. HENT:      Head: Normocephalic. Neck:      Musculoskeletal: Neck supple. Cardiovascular:      Rate and Rhythm: Normal rate and regular rhythm. Pulmonary:      Effort: Pulmonary effort is normal. No respiratory distress. Breath sounds: Normal breath sounds. No wheezing. Abdominal:      General: Bowel sounds are normal. There is no distension. Palpations: Abdomen is soft. Skin:     Comments: Coronary with ulcerative skin conditions with some mild drainage and bleeding noted bilateral upper thigh groin area and under her pannus   Neurological:      Mental Status: She is lethargic. Psychiatric:      Comments: Altered mental status unable to assess          MDM  Number of Diagnoses or Management Options  Diagnosis management comments:  Altered mental status febrile 1011 rectally she has some chronic ulcers of her lower groin area that are unsure if this is getting worse no obvious drainage mild erythema limited history review of systems patient is altered mental status we will check appropriate blood work urinalysis CT head chest abdomen and pelvis IV fluids antibiotics      Renal failure evident on her labs with a creatinine of 6 and a BUN greater than 100 this is new from May of this year she is making urine with urine noted her Baker catheter which was placed wounds have been dressed ammonia slightly elevated as well she still has a fever I believe still warrants a CAT scan to evaluate for any intra-abdominal or other infectious process she is received vancomycin and Levaquin will need admission for further evaluation management additionally CAT scan will verify there is no obstructive uropathy present as well    CAT scan without abnormality Case discussed with the hospitalist agrees to admission for renal failure and altered mental status. EKG shows a sinus rhythm the rate of 90 wandering baseline in leads I and II nonspecific T wave abnormality diffusely but no obvious ischemia or ectopy, this EKG was interpreted by me    Cardiac monitor bedside shows heart rate of 75 and regular.   Pulse ox 99% on room air         Procedures

## 2020-12-17 NOTE — ED NOTES
Rosalino Garcia, [place] [address]     [facility]   Cell     Contact him if there are any questions, patient is not able to answer all of the questions.

## 2020-12-17 NOTE — ED NOTES
Brought here by ems for altered mental status that has been declining per family members.   Patient would respond to pain for ems, destiney above 100, will respond to voice and follow commands    POSTIVE FOR BED BUGS

## 2020-12-18 PROBLEM — N17.9 ACUTE RENAL FAILURE (ARF) (HCC): Status: ACTIVE | Noted: 2020-12-18

## 2020-12-18 PROBLEM — G93.41 METABOLIC ENCEPHALOPATHY: Status: ACTIVE | Noted: 2020-12-18

## 2020-12-18 PROBLEM — G40.909 EPILEPSY (HCC): Status: ACTIVE | Noted: 2020-12-18

## 2020-12-18 PROBLEM — T14.8XXA MULTIPLE WOUNDS OF SKIN: Status: ACTIVE | Noted: 2020-12-18

## 2020-12-18 LAB
ANION GAP SERPL CALC-SCNC: 13 MMOL/L (ref 3–18)
ATRIAL RATE: 90 BPM
BASOPHILS # BLD: 0 K/UL (ref 0–0.1)
BASOPHILS NFR BLD: 0 % (ref 0–2)
BUN SERPL-MCNC: 101 MG/DL (ref 7–18)
BUN/CREAT SERPL: 23 (ref 12–20)
CALCIUM SERPL-MCNC: 9.1 MG/DL (ref 8.5–10.1)
CALCULATED P AXIS, ECG09: 49 DEGREES
CALCULATED R AXIS, ECG10: -24 DEGREES
CALCULATED T AXIS, ECG11: 85 DEGREES
CHLORIDE SERPL-SCNC: 113 MMOL/L (ref 100–111)
CO2 SERPL-SCNC: 16 MMOL/L (ref 21–32)
CREAT SERPL-MCNC: 4.45 MG/DL (ref 0.6–1.3)
DIAGNOSIS, 93000: NORMAL
DIFFERENTIAL METHOD BLD: ABNORMAL
EOSINOPHIL # BLD: 0.1 K/UL (ref 0–0.4)
EOSINOPHIL NFR BLD: 1 % (ref 0–5)
ERYTHROCYTE [DISTWIDTH] IN BLOOD BY AUTOMATED COUNT: 13.6 % (ref 11.6–14.5)
GLUCOSE SERPL-MCNC: 120 MG/DL (ref 74–99)
HCT VFR BLD AUTO: 42.3 % (ref 35–45)
HGB BLD-MCNC: 13.5 G/DL (ref 12–16)
LYMPHOCYTES # BLD: 1.4 K/UL (ref 0.9–3.6)
LYMPHOCYTES NFR BLD: 15 % (ref 21–52)
MCH RBC QN AUTO: 32.4 PG (ref 24–34)
MCHC RBC AUTO-ENTMCNC: 31.9 G/DL (ref 31–37)
MCV RBC AUTO: 101.4 FL (ref 74–97)
MONOCYTES # BLD: 0.6 K/UL (ref 0.05–1.2)
MONOCYTES NFR BLD: 7 % (ref 3–10)
NEUTS SEG # BLD: 7.4 K/UL (ref 1.8–8)
NEUTS SEG NFR BLD: 77 % (ref 40–73)
P-R INTERVAL, ECG05: 136 MS
PLATELET # BLD AUTO: 204 K/UL (ref 135–420)
PMV BLD AUTO: 10.3 FL (ref 9.2–11.8)
POTASSIUM SERPL-SCNC: 3.9 MMOL/L (ref 3.5–5.5)
Q-T INTERVAL, ECG07: 350 MS
QRS DURATION, ECG06: 94 MS
QTC CALCULATION (BEZET), ECG08: 428 MS
RBC # BLD AUTO: 4.17 M/UL (ref 4.2–5.3)
SODIUM SERPL-SCNC: 142 MMOL/L (ref 136–145)
VANCOMYCIN SERPL-MCNC: 17.2 UG/ML (ref 5–40)
VENTRICULAR RATE, ECG03: 90 BPM
WBC # BLD AUTO: 9.6 K/UL (ref 4.6–13.2)

## 2020-12-18 PROCEDURE — 74011250637 HC RX REV CODE- 250/637: Performed by: HOSPITALIST

## 2020-12-18 PROCEDURE — 80202 ASSAY OF VANCOMYCIN: CPT

## 2020-12-18 PROCEDURE — 36415 COLL VENOUS BLD VENIPUNCTURE: CPT

## 2020-12-18 PROCEDURE — 74011000258 HC RX REV CODE- 258: Performed by: HOSPITALIST

## 2020-12-18 PROCEDURE — 65270000029 HC RM PRIVATE

## 2020-12-18 PROCEDURE — 74011250636 HC RX REV CODE- 250/636: Performed by: PHYSICIAN ASSISTANT

## 2020-12-18 PROCEDURE — 2709999900 HC NON-CHARGEABLE SUPPLY

## 2020-12-18 PROCEDURE — 80048 BASIC METABOLIC PNL TOTAL CA: CPT

## 2020-12-18 PROCEDURE — 85025 COMPLETE CBC W/AUTO DIFF WBC: CPT

## 2020-12-18 PROCEDURE — 74011250636 HC RX REV CODE- 250/636: Performed by: HOSPITALIST

## 2020-12-18 PROCEDURE — 74011000258 HC RX REV CODE- 258: Performed by: PHYSICIAN ASSISTANT

## 2020-12-18 RX ORDER — HEPARIN SODIUM 5000 [USP'U]/ML
5000 INJECTION, SOLUTION INTRAVENOUS; SUBCUTANEOUS EVERY 8 HOURS
Status: DISCONTINUED | OUTPATIENT
Start: 2020-12-18 | End: 2020-12-20 | Stop reason: HOSPADM

## 2020-12-18 RX ORDER — CHLORPROMAZINE HYDROCHLORIDE 25 MG/1
25 TABLET, FILM COATED ORAL EVERY EVENING
Status: DISCONTINUED | OUTPATIENT
Start: 2020-12-18 | End: 2020-12-20 | Stop reason: HOSPADM

## 2020-12-18 RX ORDER — NALOXONE HYDROCHLORIDE 0.4 MG/ML
0.4 INJECTION, SOLUTION INTRAMUSCULAR; INTRAVENOUS; SUBCUTANEOUS AS NEEDED
Status: DISCONTINUED | OUTPATIENT
Start: 2020-12-18 | End: 2020-12-20 | Stop reason: HOSPADM

## 2020-12-18 RX ORDER — MORPHINE SULFATE 2 MG/ML
2 INJECTION, SOLUTION INTRAMUSCULAR; INTRAVENOUS
Status: DISCONTINUED | OUTPATIENT
Start: 2020-12-18 | End: 2020-12-19

## 2020-12-18 RX ORDER — ONDANSETRON 2 MG/ML
4 INJECTION INTRAMUSCULAR; INTRAVENOUS
Status: DISCONTINUED | OUTPATIENT
Start: 2020-12-18 | End: 2020-12-20 | Stop reason: HOSPADM

## 2020-12-18 RX ORDER — ACETAMINOPHEN 325 MG/1
650 TABLET ORAL
Status: DISCONTINUED | OUTPATIENT
Start: 2020-12-18 | End: 2020-12-20 | Stop reason: HOSPADM

## 2020-12-18 RX ORDER — ACETAMINOPHEN 650 MG/1
650 SUPPOSITORY RECTAL
Status: DISCONTINUED | OUTPATIENT
Start: 2020-12-18 | End: 2020-12-20 | Stop reason: HOSPADM

## 2020-12-18 RX ORDER — LEVOFLOXACIN 5 MG/ML
500 INJECTION, SOLUTION INTRAVENOUS ONCE
Status: COMPLETED | OUTPATIENT
Start: 2020-12-19 | End: 2020-12-19

## 2020-12-18 RX ORDER — PROMETHAZINE HYDROCHLORIDE 25 MG/1
12.5 TABLET ORAL
Status: DISCONTINUED | OUTPATIENT
Start: 2020-12-18 | End: 2020-12-20 | Stop reason: HOSPADM

## 2020-12-18 RX ORDER — POLYETHYLENE GLYCOL 3350 17 G/17G
17 POWDER, FOR SOLUTION ORAL DAILY PRN
Status: DISCONTINUED | OUTPATIENT
Start: 2020-12-18 | End: 2020-12-20 | Stop reason: HOSPADM

## 2020-12-18 RX ORDER — DEXTROSE MONOHYDRATE AND SODIUM CHLORIDE 5; .45 G/100ML; G/100ML
125 INJECTION, SOLUTION INTRAVENOUS CONTINUOUS
Status: DISCONTINUED | OUTPATIENT
Start: 2020-12-18 | End: 2020-12-20

## 2020-12-18 RX ADMIN — CHLORPROMAZINE HYDROCHLORIDE 25 MG: 25 TABLET, FILM COATED ORAL at 22:29

## 2020-12-18 RX ADMIN — HEPARIN SODIUM 5000 UNITS: 5000 INJECTION INTRAVENOUS; SUBCUTANEOUS at 10:04

## 2020-12-18 RX ADMIN — HEPARIN SODIUM 5000 UNITS: 5000 INJECTION INTRAVENOUS; SUBCUTANEOUS at 17:31

## 2020-12-18 RX ADMIN — MORPHINE SULFATE 2 MG: 2 INJECTION, SOLUTION INTRAMUSCULAR; INTRAVENOUS at 22:29

## 2020-12-18 RX ADMIN — HEPARIN SODIUM 5000 UNITS: 5000 INJECTION INTRAVENOUS; SUBCUTANEOUS at 01:54

## 2020-12-18 RX ADMIN — LEVETIRACETAM 250 MG: 100 INJECTION, SOLUTION INTRAVENOUS at 10:04

## 2020-12-18 RX ADMIN — DEXTROSE MONOHYDRATE AND SODIUM CHLORIDE 125 ML/HR: 5; .45 INJECTION, SOLUTION INTRAVENOUS at 07:42

## 2020-12-18 RX ADMIN — DEXTROSE MONOHYDRATE AND SODIUM CHLORIDE 125 ML/HR: 5; .45 INJECTION, SOLUTION INTRAVENOUS at 01:22

## 2020-12-18 RX ADMIN — DEXTROSE MONOHYDRATE AND SODIUM CHLORIDE 125 ML/HR: 5; .45 INJECTION, SOLUTION INTRAVENOUS at 23:30

## 2020-12-18 NOTE — PROGRESS NOTES
Problem: Discharge Planning  Goal: *DISCHARGE TO SAFE ENVIRONMENT  Outcome: Progressing Towards Goal        Reason for Admission:   Metabolic Encephalopathy                    RUR Score:          15           Plan for utilizing home health:     Yes. Was using [facility] for 2 visits and stated we will figure out a company when discharge closer     PCP: First and Last name:  Derrick Pacheco   Name of Practice:    Are you a current patient: Yes/No: yes   Approximate date of last visit: last Mon   Can you participate in a virtual visit with your PCP: in person                    Current Advanced Directive/Advance Care Plan: Spouse stated there is no way either of them would ever do an advance directive                         Transition of Care Plan:                    Interviewed spouse on phone and verified demographics. Lives in 2nd floor apartment with [address] and looking for a first floor apartment. Pt uses cane and walker. Plan is Home and [facility] and spouse will bring home      Patient has designated ________________________ to participate in his/her discharge plan and to receive any needed information. Katiuska galeano     Spouse Home Ph:  Work Ph:  Mobile Ph: [phone]     Care Management Interventions  PCP Verified by CM:  Yes  Mode of Transport at Discharge: Self  Transition of Care Consult (CM Consult): Discharge Planning  Current Support Network: Lives with Spouse  Confirm Follow Up Transport: Family  Discharge Location  Discharge Placement: Home with home health

## 2020-12-18 NOTE — ED NOTES
TRANSFER - ED to INPATIENT REPORT:    Verbal report given to Richard RN(name) on Chaim Gave  being transferred to 2200(unit) for routine progression of care       Report consisted of patients Situation, Background, Assessment and   Recommendations(SBAR). SBAR report made available to receiving floor on this patient being transferred to [address] [place] [address] (2200)  for routine progression of care       Admitting diagnosis Sepsis ([facility] [address] [A41.9]    Information from the following report(s) SBAR, ED Summary, MAR and Recent Results was made available to receiving floor. Lines:   Peripheral IV 12/17/20 Anterior;Proximal;Right Forearm (Active)       Peripheral IV 12/17/20 Anterior;Right Forearm (Active)       Peripheral IV 12/17/20 Left Antecubital (Active)        Medication list updated today    Opportunity for questions and clarification was provided.       Patient is only aware of  place Sepsis summary initial lactic les than 2, antibiotics given, 1L NS bolus given*  Patient is  continent and ambulatory with assist     Valuables transported with patient     Patient transported with:   Tech    MAP (Monitor): 89 =Monitored (most recent)  Vitals w/ MEWS Score (last day)     Date/Time MEWS Score Pulse Resp Temp BP Level of Consciousness SpO2    12/18/20 0001  2  77  12  98.1 °F (36.7 °C)  125/78  (!) Responds to Pain  95 %    12/17/20 2330    74      138/78    96 %    12/17/20 2315    73      132/74    95 %    12/17/20 2300    72      131/68    96 %    12/17/20 2245    74      131/73    96 %    12/17/20 2230    72      (!) 141/74    97 %    12/17/20 2215    72      135/78    95 %    12/17/20 2200    71      136/74    96 %    12/17/20 2145    71      (!) 141/73    97 %    12/17/20 2130    70      129/71    96 %    12/17/20 2115    70      125/76    96 %    12/17/20 2100    71      133/78    95 %    12/17/20 2045    72      (!) 140/77    96 %    12/17/20 2030    74     126/79    97 %    12/17/20 2015    76      (!) 142/80    96 %    12/17/20 2000    71  17    127/68    96 %    12/17/20 1945    71  19    117/73    96 %    12/17/20 1930    75  15    119/72    95 %    12/17/20 1915    75  15    116/74    96 %    12/17/20 1907        97.9 °F (36.6 °C)          12/17/20 1900    78  19    127/74    96 %    12/17/20 1845    76  16    112/63    96 %    12/17/20 1830    78  21    123/71    95 %    12/17/20 1816        98.8 °F (37.1 °C)    Responds to Voice      12/17/20 1815    77      114/73    96 %    12/17/20 1800          119/76        12/17/20 1730    83  16    (!) 128/96    95 %    12/17/20 1715    80  15    (!) 159/133    95 %    12/17/20 1700    84    99.9 °F (37.7 °C)  134/74    94 %    12/17/20 1645    87      (!) 147/84    94 %    12/17/20 1631  3  89  20  (!) 101.1 °F (38.4 °C)  125/83  Alert  93 %    12/17/20 1630    89      (!) 142/78    93 %    12/17/20 1615    89      125/83    92 %              Septic Patients:     Lactic Acid  Lab Results   Component Value Date    LACPOC 1.67 12/17/2020    LACPOC 1.3 06/28/2018    (Most recent on top)    All ordered antibiotics initiated within first 3 hours of TIME ZERO?   YES

## 2020-12-18 NOTE — H&P
History and Physical    Patient: Luh Cardoza               Sex: female          DOA: 12/17/2020       YOB: 1955      Age:  76 y.o.        LOS:  LOS: 1 day        HPI:     Luh Cardoza is a 76 y.o. female who was brought to the ER with change in her mental status. She is not talking as much as she normally does. According to the , who spoke with the ER MD, she doesn't talk a lot at baseline, but she seemed noticeably worse according to the . There was no seizure activity witnessed. She does not have cough or fever. Past Medical History:   Diagnosis Date    Diabetes     Dyslipidemia     Hypertension     Hypertension     Noncompliance     Polysubstance abuse ([facility] [address]     ETOH,  marijuana    Psychosis ([facility] [address]     Pulmonary hypertension     RVSP 60mmHg (ECHO 6/15)    Seizure disorder        Social History:   Tobacco use:  Unknown   Alcohol use:  Unknown   Occupation:  Unknown    Family History:   Unknown    Review of Systems    Constitutional:  No fever or weight loss  HEENT:  No headache or visual changes  Cardiovascular:  No chest pain or diaphoresis  Respiratory:  No coughing, wheezing, or shortness of breath. GI:  No nausea or vomitting. No diarrhea  :  No hematuria or dysuria  Skin:  No rashes or moles  Neuro:  Decreased mental status as above  Hematological:  No bruising or bleeding  Endocrine:  Patient has known diabetes, no thyroid disease    Physical Exam:      Visit Vitals  BP (!) 140/79 (BP 1 Location: Right arm, BP Patient Position: At rest)   Pulse 64   Temp 97.6 °F (36.4 °C)   Resp 16   Ht 5' 5\" (1.651 m)   Wt 78.2 kg (172 lb 6.4 oz)   SpO2 96%   BMI 28.69 kg/m²       Physical Exam:    Gen:  No distress, not fully interactive  HEENT:  Normal cephalic atraumatic, extra-occular movements are intact.   Neck:  Supple, No JVD  Lungs:  Clear bilaterally, no wheeze, no rales, normal effort  Heart:  Regular Rate and Rhythm, normal S1 and S2, no edema  Abdomen:  Soft, non tender, normal bowel sounds, no guarding. Extremities:  Well perfused, no cyanosis or edema  Neurological:  Normal tone, no seizure  Skin:  No rashes or moles  Mental Status:  Quiet, not fully interactive    Laboratory Studies:    BMP:   Lab Results   Component Value Date/Time     12/18/2020 02:24 AM    K 3.9 12/18/2020 02:24 AM     (H) 12/18/2020 02:24 AM    CO2 16 (L) 12/18/2020 02:24 AM    AGAP 13 12/18/2020 02:24 AM     (H) 12/18/2020 02:24 AM     (H) 12/18/2020 02:24 AM    CREA 4.45 (H) 12/18/2020 02:24 AM    GFRAA 12 (L) 12/18/2020 02:24 AM    GFRNA 10 (L) 12/18/2020 02:24 AM     CBC:   Lab Results   Component Value Date/Time    WBC 9.6 12/18/2020 02:24 AM    HGB 13.5 12/18/2020 02:24 AM    HCT 42.3 12/18/2020 02:24 AM     12/18/2020 02:24 AM       Assessment/Plan     Principal Problem:    Metabolic encephalopathy (41/98/4471)    Active Problems:    Acute renal failure (ARF) ([address] (12/18/2020)      Diabetes ()      Hypertension ()      Multiple wounds of skin (12/18/2020)      Dyslipidemia ()      Sepsis ([address] (12/17/2020)      Overview: Possible sepsis      Epilepsy ([facility] [address] (12/18/2020)        PLAN:    Follow mental status  Follow renal function with IVF  Wound care  BS control  BP control  Will give one dose Levaquin as we assess for infection. Further dosing based on results and ongoing renal function.

## 2020-12-18 NOTE — ED NOTES
Patient continues to sleep with equal unlabored respirations. Remains on cardiac, BP and pulse ox monitors.

## 2020-12-18 NOTE — ED NOTES
Patient will be admitted to hospital.  has been made aware. REPORT given to incoming RN Amber Aguero. Patient asleep at this time. Afebrile.

## 2020-12-18 NOTE — PROGRESS NOTES
Problem: Pressure Injury - Risk of  Goal: *Prevention of pressure injury  Description: Document Jerman Scale and appropriate interventions in the flowsheet. Outcome: Progressing Towards Goal  Note: Pressure Injury Interventions:  Sensory Interventions: Assess changes in LOC, Assess need for specialty bed, Discuss PT/OT consult with provider, Keep linens dry and wrinkle-free    Moisture Interventions: Absorbent underpads, Apply protective barrier, creams and emollients, Check for incontinence Q2 hours and as needed, Internal/External urinary devices    Activity Interventions: Assess need for specialty bed, Increase time out of bed, PT/OT evaluation, Pressure redistribution bed/mattress(bed type)    Mobility Interventions: Assess need for specialty bed, HOB 30 degrees or less, Pressure redistribution bed/mattress (bed type)    Nutrition Interventions: Document food/fluid/supplement intake                     Problem: Patient Education: Go to Patient Education Activity  Goal: Patient/Family Education  Outcome: Progressing Towards Goal     Problem: Pain  Goal: *Control of Pain  Outcome: Progressing Towards Goal  Goal: *PALLIATIVE CARE:  Alleviation of Pain  Outcome: Progressing Towards Goal     Problem: Patient Education: Go to Patient Education Activity  Goal: Patient/Family Education  Outcome: Progressing Towards Goal     Problem: Falls - Risk of  Goal: *Absence of Falls  Description: Document Cristal Fall Risk and appropriate interventions in the flowsheet.   Outcome: Progressing Towards Goal  Note: Fall Risk Interventions:  Mobility Interventions: Bed/chair exit alarm, Communicate number of staff needed for ambulation/transfer, Patient to call before getting OOB, PT Consult for mobility concerns, PT Consult for assist device competence, Strengthening exercises (ROM-active/passive), Utilize walker, cane, or other assistive device    Mentation Interventions: Adequate sleep, hydration, pain control, Bed/chair exit alarm, Door open when patient unattended, Evaluate medications/consider consulting pharmacy, Increase mobility, More frequent rounding, Reorient patient, Update white board    Medication Interventions: Bed/chair exit alarm, Evaluate medications/consider consulting pharmacy, Patient to call before getting OOB, Teach patient to arise slowly    Elimination Interventions: Bed/chair exit alarm, Call light in reach, Patient to call for help with toileting needs, Toileting schedule/hourly rounds    History of Falls Interventions: Bed/chair exit alarm, Door open when patient unattended, Evaluate medications/consider consulting pharmacy, Investigate reason for fall         Problem: Patient Education: Go to Patient Education Activity  Goal: Patient/Family Education  Outcome: Progressing Towards Goal     Problem: General Wound Care  Goal: *Non-infected wound: Improvement of existing wound, absence of infection, and maintenance of skin integrity  Outcome: Progressing Towards Goal  Goal: *Infected Wound: Prevention of further infection and promotion of healing  Description: Infection control procedures (eg: clean dressings, clean gloves, hand washing, precautions to isolate wound from contamination, sterile instruments used for wound debridement) should be implemented.   Outcome: Progressing Towards Goal  Goal: Interventions  Outcome: Progressing Towards Goal     Problem: Patient Education: Go to Patient Education Activity  Goal: Patient/Family Education  Outcome: Progressing Towards Goal

## 2020-12-18 NOTE — ED NOTES
Assumed care of patient at this time. Patient asleep on stretcher with equal, unlabored respirations. Remains on cardiac, BP, and pulse ox monitors. IV sites without s/sx of infiltration/infection. Baker cath in place and draining. Wounds on upper thighs and pannus with clean dry dressings. Patient responsive to painful stimuli.

## 2020-12-18 NOTE — PROGRESS NOTES
0100  -- TRANSFER - IN REPORT:    PT being received from EMERGENCY (unit) for routine progression of care      Report consisted of patients Situation, Background, Assessment and   Recommendations(SBAR). Information from the following report(s) SBAR, Procedure Summary, MAR and Recent Results was reviewed with the receiving nurse. Opportunity for questions and clarification was provided. Assessment and Required documentation limited due to pt's altered mental statuscompleted upon patients arrival to unit and care assumed. Isolation cart 25, fall and NPO sign placed. Cardiac monitor, Fall and Allergy band applied. Skin assessment completed with Charge AMRIK Kirkland. Per Chart Review, documentation on the below wounds is listed as             0400 -- Nursing Ashly Back on unit, advised of two blue bags containing pt's personal belongings that were transported with pt from ED. Per ED one bag contains medication and other is clothing, bags were not opened. Suggestion of labeling with pt sticker and pt belonging's bag (clear with blue writing) was attached by stapling to outside of bags and placed in closet.      -- Bedside, Verbal and Written shift change report given to (oncoming nurse) by NIKKO (offgoing nurse). Report included the following information SBAR, Kardex, Intake/Output, MAR and Recent Results. Skin assessment completed.

## 2020-12-18 NOTE — ED NOTES
Patient awake and asking for something to drink. Patient not responding to questions, only continually stating she needs something to drink.

## 2020-12-18 NOTE — WOUND CARE
Physical Exam  
Room 2224: chart review due to remote. Discussed with primary nurse Apryl Myles RN. Wounds located on lower abdomen, both inner groins, both upper thighs. Blood and purulence reported. [facility] internal medicine physician documents less likely necrotizing fasciitis etiology & could be Hidradenitis suppurativa. Will start silver hydrofiber dressings to help with:  
Creating moist wound healing, absorption, protection, insulation, fill any dead space, loosen any necrotic tissue & drainage, & silver antimicrobial therapy due to reported wound odor. Will plan to see patient on Monday December 21, 2020. Cj BENITESN, RN, [facility], [address]

## 2020-12-18 NOTE — PROGRESS NOTES
Bedside, Verbal and Written shift change report given to [address] [place] [address]  (oncoming nurse) by Marce Freeman (offgoing nurse). Report included the following information SBAR, Kardex, Intake/Output, MAR and Recent Results. Initial assessment completed see flow sheet. Patient responds to voice,20g PIV rt proximal f arm capped, 20g PIV to rt f arm running D5 1/2NS at 125 m/h, 22g PIV lft ac capped. Patient has wound to pannus TG producing pus, BL thigh wounds wrapped with gauze abd pads and ace wraps CDI. Patient resting in bed, call light in reach, no issues noted. 1004 Due meds given see MAR. Patient resting in bed, call light in reach, no issues noted. 1230 Patient oriented to self, more talkative and awake. 46 Perfect served DR garg to inform him pt was awake and alert and requesting diet, order for bedside swallow given. RBV. If passed Patient may have diet. 1731 Due med given see MAR. Bedside swallow administered and passed. Pt would like a puree diet due to poor dentition and missing teeth. Patient resting in bed,  bedside,call light in reach, no issues noted. 1915 Bedside, Verbal and Written shift change report given to Marce Freeman (oncoming nurse). Report included the following information SBAR, Kardex, Intake/Output, MAR and Recent Results.

## 2020-12-19 PROBLEM — E87.6 HYPOKALEMIA: Status: ACTIVE | Noted: 2020-12-19

## 2020-12-19 LAB
ANION GAP SERPL CALC-SCNC: 9 MMOL/L (ref 3–18)
BACTERIA SPEC CULT: NORMAL
BUN SERPL-MCNC: 65 MG/DL (ref 7–18)
BUN/CREAT SERPL: 45 (ref 12–20)
CALCIUM SERPL-MCNC: 7.9 MG/DL (ref 8.5–10.1)
CHLORIDE SERPL-SCNC: 113 MMOL/L (ref 100–111)
CO2 SERPL-SCNC: 19 MMOL/L (ref 21–32)
CREAT SERPL-MCNC: 1.43 MG/DL (ref 0.6–1.3)
GLUCOSE SERPL-MCNC: 111 MG/DL (ref 74–99)
LEVETIRACETAM SERPL-MCNC: 42 UG/ML (ref 10–40)
POTASSIUM SERPL-SCNC: 3.1 MMOL/L (ref 3.5–5.5)
SERVICE CMNT-IMP: NORMAL
SODIUM SERPL-SCNC: 141 MMOL/L (ref 136–145)

## 2020-12-19 PROCEDURE — 80048 BASIC METABOLIC PNL TOTAL CA: CPT

## 2020-12-19 PROCEDURE — 74011250636 HC RX REV CODE- 250/636: Performed by: HOSPITALIST

## 2020-12-19 PROCEDURE — 74011000258 HC RX REV CODE- 258: Performed by: HOSPITALIST

## 2020-12-19 PROCEDURE — 36415 COLL VENOUS BLD VENIPUNCTURE: CPT

## 2020-12-19 PROCEDURE — 74011000258 HC RX REV CODE- 258: Performed by: PHYSICIAN ASSISTANT

## 2020-12-19 PROCEDURE — 74011250637 HC RX REV CODE- 250/637: Performed by: HOSPITALIST

## 2020-12-19 PROCEDURE — 74011250637 HC RX REV CODE- 250/637: Performed by: PHYSICIAN ASSISTANT

## 2020-12-19 PROCEDURE — 65270000029 HC RM PRIVATE

## 2020-12-19 PROCEDURE — 87040 BLOOD CULTURE FOR BACTERIA: CPT

## 2020-12-19 PROCEDURE — 2709999900 HC NON-CHARGEABLE SUPPLY

## 2020-12-19 PROCEDURE — 74011250636 HC RX REV CODE- 250/636: Performed by: PHYSICIAN ASSISTANT

## 2020-12-19 RX ORDER — OXCARBAZEPINE 300 MG/1
150 TABLET, FILM COATED ORAL 2 TIMES DAILY
Status: DISCONTINUED | OUTPATIENT
Start: 2020-12-19 | End: 2020-12-20 | Stop reason: HOSPADM

## 2020-12-19 RX ADMIN — OXCARBAZEPINE 150 MG: 300 TABLET, FILM COATED ORAL at 20:25

## 2020-12-19 RX ADMIN — CHLORPROMAZINE HYDROCHLORIDE 25 MG: 25 TABLET, FILM COATED ORAL at 17:44

## 2020-12-19 RX ADMIN — MORPHINE SULFATE 2 MG: 2 INJECTION, SOLUTION INTRAMUSCULAR; INTRAVENOUS at 02:02

## 2020-12-19 RX ADMIN — LEVETIRACETAM 250 MG: 100 INJECTION, SOLUTION INTRAVENOUS at 08:56

## 2020-12-19 RX ADMIN — LEVETIRACETAM 750 MG: 100 INJECTION, SOLUTION INTRAVENOUS at 20:25

## 2020-12-19 RX ADMIN — LEVOFLOXACIN 500 MG: 5 INJECTION, SOLUTION INTRAVENOUS at 15:46

## 2020-12-19 RX ADMIN — HEPARIN SODIUM 5000 UNITS: 5000 INJECTION INTRAVENOUS; SUBCUTANEOUS at 02:02

## 2020-12-19 RX ADMIN — HEPARIN SODIUM 5000 UNITS: 5000 INJECTION INTRAVENOUS; SUBCUTANEOUS at 17:44

## 2020-12-19 RX ADMIN — DEXTROSE MONOHYDRATE AND SODIUM CHLORIDE 125 ML/HR: 5; .45 INJECTION, SOLUTION INTRAVENOUS at 12:32

## 2020-12-19 RX ADMIN — MORPHINE SULFATE 2 MG: 2 INJECTION, SOLUTION INTRAMUSCULAR; INTRAVENOUS at 06:18

## 2020-12-19 RX ADMIN — POTASSIUM BICARBONATE 40 MEQ: 782 TABLET, EFFERVESCENT ORAL at 13:24

## 2020-12-19 RX ADMIN — HEPARIN SODIUM 5000 UNITS: 5000 INJECTION INTRAVENOUS; SUBCUTANEOUS at 09:00

## 2020-12-19 RX ADMIN — VANCOMYCIN HYDROCHLORIDE 1000 MG: 1 INJECTION, POWDER, LYOPHILIZED, FOR SOLUTION INTRAVENOUS at 06:35

## 2020-12-19 NOTE — PROGRESS NOTES
1697- Verbal shift change report given to Lauri RN (oncoming nurse) by AMRIK Ramos (offgoing nurse). Report included the following information SBAR, Kardex, MAR and Recent Results. 1317- Vitals, meds given. Pt resting in bed with no signs of distress. 1546- IV antibiotic given    8730- PM meds given, vitals completed, bedside commode emptied. 1933- Bedside and Verbal shift change report given to Keyla Zavala RN  (oncoming nurse) by AMRIK Freeman (offgoing nurse). Report included the following information SBAR, Kardex, MAR and Recent Results.

## 2020-12-19 NOTE — PROGRESS NOTES
Problem: Pressure Injury - Risk of  Goal: *Prevention of pressure injury  Description: Document Jerman Scale and appropriate interventions in the flowsheet. Outcome: Progressing Towards Goal  Note: Pressure Injury Interventions:  Sensory Interventions: Assess changes in LOC, Keep linens dry and wrinkle-free, Pressure redistribution bed/mattress (bed type)    Moisture Interventions: Absorbent underpads, Apply protective barrier, creams and emollients, Internal/External urinary devices, Minimize layers    Activity Interventions: Assess need for specialty bed, Pressure redistribution bed/mattress(bed type)    Mobility Interventions: Pressure redistribution bed/mattress (bed type)    Nutrition Interventions: Document food/fluid/supplement intake    Friction and Shear Interventions: Apply protective barrier, creams and emollients, HOB 30 degrees or less, Minimize layers                Problem: Pain  Goal: *Control of Pain  Outcome: Progressing Towards Goal  Problem: Patient Education: Go to Patient Education Activity  Goal: Patient/Family Education  Outcome: Progressing Towards Goal     Problem: Falls - Risk of  Goal: *Absence of Falls  Description: Document Cristal Fall Risk and appropriate interventions in the flowsheet.   Outcome: Progressing Towards Goal  Note: Fall Risk Interventions:  Mobility Interventions: Bed/chair exit alarm, Communicate number of staff needed for ambulation/transfer    Mentation Interventions: Adequate sleep, hydration, pain control, Bed/chair exit alarm, Door open when patient unattended, More frequent rounding, Reorient patient    Medication Interventions: Bed/chair exit alarm    Elimination Interventions: Bed/chair exit alarm, Call light in reach, Toileting schedule/hourly rounds    History of Falls Interventions: Bed/chair exit alarm         Problem: General Wound Care  Goal: *Non-infected wound: Improvement of existing wound, absence of infection, and maintenance of skin integrity  Outcome: Progressing Towards Goal  Goal: *Infected Wound: Prevention of further infection and promotion of healing  Description: Infection control procedures (eg: clean dressings, clean gloves, hand washing, precautions to isolate wound from contamination, sterile instruments used for wound debridement) should be implemented.   Outcome: Progressing Towards Goal  Goal: Interventions  Outcome: Progressing Towards Goal

## 2020-12-19 NOTE — PROGRESS NOTES
Kinetic Dosing- Initial Progress Note    Pharmacy Consult ordered by Dr. Adrian Alcantar     Indication: Sepsis of unknown etiology    Patient clinical status and labs ordered/reviewed. Pt Weight Weight: 78.2 kg (172 lb 6.4 oz)   Serum Creatinine Lab Results   Component Value Date/Time    Creatinine 4.45 (H) 12/18/2020 02:24 AM    Creatinine, POC 0.9 05/01/2017 11:08 AM       Creatinine Clearance Estimated Creatinine Clearance: 12.9 mL/min (A) (based on SCr of 4.45 mg/dL (H)). BUN Lab Results   Component Value Date/Time     (H) 12/18/2020 02:24 AM    BUN, POC 7 05/01/2017 11:08 AM       WBC Lab Results   Component Value Date/Time    WBC 9.6 12/18/2020 02:24 AM      Temperature Temp: 98.1 °F (36.7 °C)   HR Pulse (Heart Rate): 79     BP BP: 131/87             Vancomycin RANDOM level STAT.    Further dosing to follow results    Continue to monitor    Sign: DEYSI Mojica  Date: 12/18/2020  Time: 9:06 PM

## 2020-12-19 NOTE — PROGRESS NOTES
Internal Medicine Progress Note    Patient's Name: Mayte Thomas  Admit Date: 12/17/2020  Length of Stay: 2      Assessment/Plan     Principal Problem:    Metabolic encephalopathy (91/16/5875)    Active Problems:    Epilepsy ([facility] [address] (12/18/2020)      Acute renal failure (ARF) ([facility] (12/18/2020)      Diabetes ()      Hypertension ()      Dyslipidemia ()      Multiple wounds of skin (12/18/2020)      Hypokalemia (12/19/2020)      Encephalopathy  - 2/2 anti-epileptics, renal failure, ? infection, narcotics  - will d/c morphine in setting of AMS and decreased responsiveness  - 1/2 blood cultures +GPC, will repeat today. Epilepsy  - cont keppra increase to home dose since renal function has improved  - resume trileptal    ARF  - improving rapidly  - monitor BMP  - cont IV fluids    DM  - lantus/ssi    Hypokalemia  - replace    - Cont acceptable home medications for chronic conditions   - DVT protocol    I have personally reviewed all pertinent labs and films that have officially resulted over the last 24 hours. I have personally checked for all pending labs that are awaiting final results. Anticipated discharge: >2 days    HPI     Mayte Thomas is a 76 y.o. female who was brought to the ER with change in her mental status. She is not talking as much as she normally does. According to the , who spoke with the ER MD, she doesn't talk a lot at baseline, but she seemed noticeably worse according to the . There was no seizure activity witnessed. She does not have cough or fever. Hospital Course     She was started on IV fluids. 1/2 BCx from 12/17 +GPC. Subjective     Pt s/e @ bedside. No major events overnight. Pt c/o back pain.     Objective     Visit Vitals  /84 (BP 1 Location: Left arm, BP Patient Position: At rest)   Pulse 77   Temp 98.2 °F (36.8 °C)   Resp 18   Ht 5' 5\" (1.651 m)   Wt 78.2 kg (172 lb 6.4 oz)   SpO2 98%   BMI 28.69 kg/m²       Physical Exam:  General Appearance: NAD, somnolent, knows name, answers questions appropriately  HENT: normocephalic/atraumatic, moist mucus membranes  Neck: No JVD, supple  Lungs: CTA with normal respiratory effort  CV: RRR, no m/r/g  Abdomen: soft, non-tender, normal bowel sounds  Extremities: no cyanosis, no peripheral edema  Neuro: No focal deficits, motor/sensory intact  Skin: Normal color, intact      Intake and Output:  Current Shift:  No intake/output data recorded. Last three shifts:  12/17 1901 - 12/19 0700  In: 4440.8 [I.V.:4440.8]  Out: 8796 [Urine:3475]    Lab/Data Reviewed:  BMP:   Lab Results   Component Value Date/Time     12/19/2020 02:00 AM    K 3.1 (L) 12/19/2020 02:00 AM     (H) 12/19/2020 02:00 AM    CO2 19 (L) 12/19/2020 02:00 AM    AGAP 9 12/19/2020 02:00 AM     (H) 12/19/2020 02:00 AM    BUN 65 (H) 12/19/2020 02:00 AM    CREA 1.43 (H) 12/19/2020 02:00 AM    GFRAA 44 (L) 12/19/2020 02:00 AM    GFRNA 37 (L) 12/19/2020 02:00 AM     CBC: No results found for: WBC, HGB, HGBEXT, HCT, HCTEXT, PLT, PLTEXT, HGBEXT, HCTEXT, PLTEXT    Imaging Reviewed:  No results found.     Medications Reviewed:  Current Facility-Administered Medications   Medication Dose Route Frequency    [START ON 12/20/2020] vancomycin (VANCOCIN) 1,000 mg in 0.9% sodium chloride 250 mL (VIAL-MATE)  1,000 mg IntraVENous Q24H    [START ON 12/21/2020] VANCOMYCIN INFORMATION NOTE   Other ONCE    potassium bicarb-citric acid (EFFER-K) tablet 40 mEq  40 mEq Oral ONCE    levETIRAcetam (KEPPRA) 750 mg in 0.9% sodium chloride 100 mL IVPB  750 mg IntraVENous Q12H    OXcarbazepine (TRILEPTAL) tablet 150 mg  150 mg Oral BID    acetaminophen (TYLENOL) tablet 650 mg  650 mg Oral Q6H PRN    Or    acetaminophen (TYLENOL) suppository 650 mg  650 mg Rectal Q6H PRN    polyethylene glycol (MIRALAX) packet 17 g  17 g Oral DAILY PRN    promethazine (PHENERGAN) tablet 12.5 mg  12.5 mg Oral Q6H PRN    Or    ondansetron (ZOFRAN) injection 4 mg  4 mg IntraVENous Q6H PRN    heparin (porcine) injection 5,000 Units  5,000 Units SubCUTAneous Q8H    dextrose 5 % - 0.45% NaCl infusion  125 mL/hr IntraVENous CONTINUOUS    levoFLOXacin (LEVAQUIN) 500 mg in D5W IVPB  500 mg IntraVENous ONCE    Vancomycin: Pharmacy to Dose   Other Rx Dosing/Monitoring    chlorproMAZINE (THORAZINE) tablet 25 mg  25 mg Oral QPM    naloxone (NARCAN) injection 0.4 mg  0.4 mg IntraVENous PRN         Andrae Busch PA-C  [facility] Medical CenterdeniceMichelle Ville [address]  Office:  [phone]  Pager: [phone]

## 2020-12-19 NOTE — PROGRESS NOTES
Pharmacy Dosing Services: Vancomycin    Indication: Sepsis of Unknown Etiology    Day of therapy: 2    Other Antimicrobials (Include dose, start day & day of therapy):  Levofloxacin (Levaquin) 500 mg IV x 1 ordered today at 1600  Current Antimicrobial Therapy (168h ago, onward)       Ordered     Start Stop    20 0753  vancomycin (VANCOCIN) 1,000 mg in 0.9% sodium chloride 250 mL (VIAL-MATE)  1,000 mg,   IntraVENous,   EVERY 24 HOURS      20 0000 --    20 0733  levoFLOXacin (LEVAQUIN) 500 mg in D5W IVPB  500 mg,   IntraVENous,   ONCE      20 1600 20 0359    20  Vancomycin: Pharmacy to Dose  Other,   RX DOSING/MONITORING      20 --                    Loading dose (date given): 2000 mg on 2020 at 1700;  random level on 2020 at 2215 = 17.2;  1 gram IV dose given on 2020 at 0600  Current Maintenance dose: Vancomycin 1 gram IV q24h starting at midnight tonight    Goal Vancomycin Level: Vancomycin Trough: 15 - 20 mcg/mL (most infections)    Vancomycin Level (if drawn):   Recent Labs     20   VANCR 17.2         Pt Weight Weight: 78.2 kg (172 lb 6.4 oz)   Temperature Temp: 98.2 °F (36.8 °C)   Temp (72hrs), Av.3 °F (36.8 °C), Min:97.2 °F (36.2 °C), Max:101.1 °F (38.4 °C)     HR Pulse (Heart Rate): 77   BP BP: 120/84     Recent Labs     20  0200 20  0224 20  1615   CREA 1.43* 4.45* 6.22*   BUN 65* 101* 107*   WBC  --  9.6 10.1     Estimated Creatinine Clearance Estimated Creatinine Clearance: 40 mL/min (A) (based on SCr of 1.43 mg/dL (H)).   Estimated Creatinine Clearance (using IBW): 34.8 mL/min      CAPD, Hemodialysis or Renal Replacement Therapy: N/A  Renal function stable? (unstable defined as SCr increase of 0.5 mg/dL or > 50% increase from baseline, whichever is greater) (Y/N): NO     Significant Cultures:   Results       Procedure Component Value Units Date/Time    CULTURE, URINE [[id number]] Collected: 20 1700 Order Status: Completed Specimen: Cath Urine Updated: 12/18/20 1629    CULTURE, BLOOD [[id number]] Collected: 12/17/20 1640    Order Status: Completed Specimen: Blood Updated: 12/18/20 2011     Special Requests: PERIPHERAL        GRAM STAIN       GRAM POSITIVE COCCI IN CLUSTERS AEROBIC BOTTLE ANAEROBIC BOTTLE                  SMEAR CALLED TO AND CORRECTLY REPEATED BY: [address] [facility] IN 2200 AT [id number]/82 TO CHHAYATSusan David Natasha 8366 AT 20:08 ON 12/18/20 BY AKBRADLEY           Culture result:       CULTURE IN PROGRESS,FURTHER UPDATES TO FOLLOW                  Sent to [facility] for ID/Susceptibility if indicated. Maintenance dose: 1000 mg IV every 24 hours  Next scheduled level: will get a vancomycin trough level prior to 3rd maintenance dose       Pharmacy will follow daily and adjust medications as appropriate for renal function and/or serum levels.     Thank you,  DEYSI Peguero  Clinical Pharmacist  [phone]

## 2020-12-19 NOTE — PROGRESS NOTES
1900  -- Bedside, Verbal and Written shift change report given to [address] (oncoming nurse) by ELAINE (offgoing nurse). Allergy band placed on pt's wrist. Report included the following information SBAR, Kardex, Intake/Output, MAR and Recent Results.       2229 -- PM and PRN pain medications administered, pt tolerated with ease, will continue to monitor.     0200 --  PRN pain medications administered, pt tolerated with ease, will continue to monitor. 0500 -- Wound and Hygiene care provided, see flowsheet. 7613 --  PRN pain medications administered, pt tolerated with ease, will continue to monitor.       0740 -- Bedside, Verbal and Written shift change report given to [id number] [facility] by NIKKO (offgoing nurse). Report included the following information SBAR, Kardex, Intake/Output, MAR and Recent Results. Skin assessment completed.

## 2020-12-20 VITALS
BODY MASS INDEX: 28.72 KG/M2 | SYSTOLIC BLOOD PRESSURE: 130 MMHG | DIASTOLIC BLOOD PRESSURE: 80 MMHG | WEIGHT: 172.4 LBS | HEART RATE: 90 BPM | HEIGHT: 65 IN | RESPIRATION RATE: 20 BRPM | TEMPERATURE: 98.6 F | OXYGEN SATURATION: 100 %

## 2020-12-20 LAB
ANION GAP SERPL CALC-SCNC: 7 MMOL/L (ref 3–18)
BASOPHILS # BLD: 0 K/UL (ref 0–0.1)
BASOPHILS NFR BLD: 0 % (ref 0–2)
BUN SERPL-MCNC: 29 MG/DL (ref 7–18)
BUN/CREAT SERPL: 40 (ref 12–20)
CALCIUM SERPL-MCNC: 8.2 MG/DL (ref 8.5–10.1)
CHLORIDE SERPL-SCNC: 119 MMOL/L (ref 100–111)
CO2 SERPL-SCNC: 20 MMOL/L (ref 21–32)
CREAT SERPL-MCNC: 0.73 MG/DL (ref 0.6–1.3)
DIFFERENTIAL METHOD BLD: ABNORMAL
EOSINOPHIL # BLD: 0.2 K/UL (ref 0–0.4)
EOSINOPHIL NFR BLD: 2 % (ref 0–5)
ERYTHROCYTE [DISTWIDTH] IN BLOOD BY AUTOMATED COUNT: 13 % (ref 11.6–14.5)
GLUCOSE SERPL-MCNC: 104 MG/DL (ref 74–99)
HCT VFR BLD AUTO: 36.9 % (ref 35–45)
HGB BLD-MCNC: 12 G/DL (ref 12–16)
LYMPHOCYTES # BLD: 1.5 K/UL (ref 0.9–3.6)
LYMPHOCYTES NFR BLD: 25 % (ref 21–52)
MAGNESIUM SERPL-MCNC: 1.9 MG/DL (ref 1.6–2.6)
MCH RBC QN AUTO: 32 PG (ref 24–34)
MCHC RBC AUTO-ENTMCNC: 32.5 G/DL (ref 31–37)
MCV RBC AUTO: 98.4 FL (ref 74–97)
MONOCYTES # BLD: 0.4 K/UL (ref 0.05–1.2)
MONOCYTES NFR BLD: 6 % (ref 3–10)
NEUTS SEG # BLD: 4.2 K/UL (ref 1.8–8)
NEUTS SEG NFR BLD: 67 % (ref 40–73)
PLATELET # BLD AUTO: 181 K/UL (ref 135–420)
PMV BLD AUTO: 10 FL (ref 9.2–11.8)
POTASSIUM SERPL-SCNC: 3.2 MMOL/L (ref 3.5–5.5)
RBC # BLD AUTO: 3.75 M/UL (ref 4.2–5.3)
SODIUM SERPL-SCNC: 146 MMOL/L (ref 136–145)
WBC # BLD AUTO: 6.3 K/UL (ref 4.6–13.2)

## 2020-12-20 PROCEDURE — 97535 SELF CARE MNGMENT TRAINING: CPT

## 2020-12-20 PROCEDURE — 74011250636 HC RX REV CODE- 250/636: Performed by: HOSPITALIST

## 2020-12-20 PROCEDURE — 97166 OT EVAL MOD COMPLEX 45 MIN: CPT

## 2020-12-20 PROCEDURE — 74011250637 HC RX REV CODE- 250/637: Performed by: PHYSICIAN ASSISTANT

## 2020-12-20 PROCEDURE — 2709999900 HC NON-CHARGEABLE SUPPLY

## 2020-12-20 PROCEDURE — 74011250637 HC RX REV CODE- 250/637: Performed by: HOSPITALIST

## 2020-12-20 PROCEDURE — 36415 COLL VENOUS BLD VENIPUNCTURE: CPT

## 2020-12-20 PROCEDURE — 97161 PT EVAL LOW COMPLEX 20 MIN: CPT

## 2020-12-20 PROCEDURE — 80048 BASIC METABOLIC PNL TOTAL CA: CPT

## 2020-12-20 PROCEDURE — 83735 ASSAY OF MAGNESIUM: CPT

## 2020-12-20 PROCEDURE — 97116 GAIT TRAINING THERAPY: CPT

## 2020-12-20 PROCEDURE — 85025 COMPLETE CBC W/AUTO DIFF WBC: CPT

## 2020-12-20 RX ORDER — CEPHALEXIN 250 MG/1
500 CAPSULE ORAL 4 TIMES DAILY
Status: DISCONTINUED | OUTPATIENT
Start: 2020-12-20 | End: 2020-12-20 | Stop reason: HOSPADM

## 2020-12-20 RX ORDER — CEPHALEXIN 500 MG/1
500 CAPSULE ORAL 4 TIMES DAILY
Qty: 20 CAP | Refills: 0 | Status: SHIPPED | OUTPATIENT
Start: 2020-12-20 | End: 2020-12-25

## 2020-12-20 RX ADMIN — CEPHALEXIN 500 MG: 250 CAPSULE ORAL at 10:18

## 2020-12-20 RX ADMIN — ACETAMINOPHEN 650 MG: 325 TABLET, FILM COATED ORAL at 00:10

## 2020-12-20 RX ADMIN — POTASSIUM BICARBONATE 40 MEQ: 782 TABLET, EFFERVESCENT ORAL at 10:17

## 2020-12-20 RX ADMIN — VANCOMYCIN HYDROCHLORIDE 1000 MG: 1 INJECTION, POWDER, LYOPHILIZED, FOR SOLUTION INTRAVENOUS at 00:11

## 2020-12-20 RX ADMIN — ACETAMINOPHEN 650 MG: 325 TABLET, FILM COATED ORAL at 05:59

## 2020-12-20 RX ADMIN — HEPARIN SODIUM 5000 UNITS: 5000 INJECTION INTRAVENOUS; SUBCUTANEOUS at 10:18

## 2020-12-20 RX ADMIN — OXCARBAZEPINE 150 MG: 300 TABLET, FILM COATED ORAL at 10:19

## 2020-12-20 RX ADMIN — HEPARIN SODIUM 5000 UNITS: 5000 INJECTION INTRAVENOUS; SUBCUTANEOUS at 02:37

## 2020-12-20 RX ADMIN — CEPHALEXIN 500 MG: 250 CAPSULE ORAL at 13:04

## 2020-12-20 RX ADMIN — LEVETIRACETAM 750 MG: 500 TABLET ORAL at 10:17

## 2020-12-20 NOTE — PROGRESS NOTES
Pulled tylenol from pyxis to help primary nurse while she was attending to the patient needs while in isolation.

## 2020-12-20 NOTE — PROGRESS NOTES
Physical Therapy Goals  Initiated 12/20/2020 and to be accomplished within 7 day(s)  1. Patient will move from supine to sit and sit to supine , scoot up and down, and roll side to side in bed with modified independence. 2.  Patient will transfer from bed to chair and chair to bed with modified independence using the least restrictive device. 3.  Patient will perform sit to stand with modified independence. 4.  Patient will ambulate with modified independence for >/= 150 feet with the least restrictive device. 5.  Patient will ascend/descend 6 stairs with handrail(s) with modified independence. PHYSICAL THERAPY EVALUATION    Patient: Jordy Mahan (88 y.o. female)  Date: 12/20/2020  Primary Diagnosis: Sepsis ([facility] [address] [A41.9]  Acute renal failure (ARF) ([facility] [address] [G76.8]  Metabolic encephalopathy [F72.78]        Precautions:     PLOF: Per , ambulatory with cane and has a walker if needed. ASSESSMENT :  Based on the objective data described below, the patient presents with impaired balance, decrease bed mobility independence, decrease transfer independence, decrease gait independence. Patient will benefit from skilled intervention to address the above impairments.   Patient's rehabilitation potential is considered to be Good  Factors which may influence rehabilitation potential include:   []         None noted  [x]         Mental ability/status  [x]         Medical condition  []         Home/family situation and support systems  []         Safety awareness  []         Pain tolerance/management  []         Other:      PLAN :  Recommendations and Planned Interventions:   [x]           Bed Mobility Training             []    Neuromuscular Re-Education  [x]           Transfer Training                   []    Orthotic/Prosthetic Training  [x]           Gait Training                          []    Modalities  []           Therapeutic Exercises           []    Edema Management/Control  [] Therapeutic Activities            []    Family Training/Education  [x]           Patient Education  [x]           Other (comment): Plan of care, bed mobility, transfer and gait with rolling walker    Frequency/Duration: Patient will be followed by physical therapy 1 time per day/3-5 days per week to address goals. Discharge Recommendations: Home Health  Further Equipment Recommendations for Discharge: N/A, has cane and walker at home     SUBJECTIVE:   Patient stated Lashae Ng is my     OBJECTIVE DATA SUMMARY:     Past Medical History:   Diagnosis Date    Diabetes     Dyslipidemia     Hypertension     Hypertension     Noncompliance     Polysubstance abuse ([facility] [address]     ETOH,  marijuana    Psychosis ([facility] [address]     Pulmonary hypertension     RVSP 60mmHg (ECHO 6/15)    Seizure disorder      Past Surgical History:   Procedure Laterality Date    HX BREAST LUMPECTOMY       Barriers to Learning/Limitations: yes;  cognitive and altered mental status (i.e.Sedation, Confusion)  Compensate with: Verbal Cues and Tactile Cues  Home Situation:  Home Situation  Home Environment: Apartment  # Steps to Enter: 18  Living Alone: No  Support Systems: Spouse/Significant Other/Partner  Patient Expects to be Discharged to[de-identified] Apartment  Current DME Used/Available at Home: Cane, straight, Walker, rolling  Critical Behavior:  Neurologic State: Confused  Orientation Level: Disoriented to place; Disoriented to situation;Oriented to person  Cognition: Decreased command following; Impaired decision making  Safety/Judgement: Decreased insight into deficits  Psychosocial  Patient Behaviors: Calm; Cooperative  Purposeful Interaction: Yes      Strength:    Strength: Generally decreased, functional(both LE)      Tone & Sensation:   Tone: Normal       Range Of Motion:  AROM: Generally decreased, functional(both LE)     Functional Mobility:  Bed Mobility:  Rolling: Modified independent  Supine to Sit: Stand-by assistance  Sit to Supine: Independent; Modified independent  Transfers:  Sit to Stand: Contact guard assistance  Stand to Sit: Contact guard assistance  Balance:   Sitting: Impaired  Sitting - Static: Good (unsupported)  Sitting - Dynamic: Fair (occasional)  Standing: Impaired  Standing - Static: Fair  Standing - Dynamic : Fair  Ambulation/Gait Training:  Distance (ft): 40 Feet (ft)  Assistive Device: Walker, rolling  Ambulation - Level of Assistance: Contact guard assistance  Gait Abnormalities: Decreased step clearance   Base of Support: Center of gravity altered  Speed/Brisa: Pace decreased (<100 feet/min)  Step Length: Left shortened;Right shortened    Pain:  Pain level pre-treatment: 0/10   Pain level post-treatment: 0/10   Pain Intervention(s) : Medication (see MAR); Rest, Ice, Repositioning  Response to intervention: Nurse notified, See doc flow    Activity Tolerance:  Fair    Please refer to the flowsheet for vital signs taken during this treatment. After treatment:   [x]         Patient left in no apparent distress sitting up in chair  []         Patient left in no apparent distress in bed  [x]         Call bell left within reach  [x]         Nursing notified  [x]         Sitter present  []         Bed alarm activated  []         SCDs applied    COMMUNICATION/EDUCATION:   [x]         Role of Physical Therapy in the acute care setting. [x]         Fall prevention education was provided and the patient/caregiver indicated understanding. [x]         Patient/family have participated as able in goal setting and plan of care. [x]         Patient/family agree to work toward stated goals and plan of care. []         Patient understands intent and goals of therapy, but is neutral about his/her participation. []         Patient is unable to participate in goal setting/plan of care: ongoing with therapy staff.  []         Other:     Thank you for this referral.  Arminda Miranda, PT   Time Calculation: 24 mins      Eval Complexity: History: MEDIUM  Complexity : 1-2 comorbidities / personal factors will impact the outcome/ POC Exam:MEDIUM Complexity : 3 Standardized tests and measures addressing body structure, function, activity limitation and / or participation in recreation  Presentation: LOW Complexity : Stable, uncomplicated  Clinical Decision Making:Medium Complexity    Overall Complexity:LOW

## 2020-12-20 NOTE — PROGRESS NOTES
Pt dc'd home with hh  Spouse in room. He plans to call cab for them to go home. He states she is active with [address] Provider list has been given to the patient and/or patient representative. Patient and/or patient representative has signed the [place] of Choice selecting __________[facility]___as their preference agency and a copy given. Both Home Health Provider list and Freedom of Choice have been placed on the chart. fax'd orders and info to [facility] at [phone], received confirmation of fax. Care Management Interventions  PCP Verified by CM:  Yes  Palliative Care Criteria Met (RRAT>21 & CHF Dx)?: No  Mode of Transport at Discharge: Self  Transition of Care Consult (CM Consult): [address]: No  Reason Outside [place]: Patient already serviced by other home care/hospice agency  Discharge Durable Medical Equipment: No  Physical Therapy Consult: Yes  Occupational Therapy Consult: Yes  Speech Therapy Consult: No  Current Support Network: Lives with Spouse  Confirm Follow Up Transport: Cab  The Plan for Transition of Care is Related to the Following Treatment Goals : sn pt/ot   The Patient and/or Patient Representative was Provided with a Choice of Provider and Agrees with the Discharge Plan?: Yes  Name of the Patient Representative Who was Provided with a Choice of Provider and Agrees with the Discharge Plan: Montez Grigsby, spouse  Freedom of Choice List was Provided with Basic Dialogue that Supports the Patient's Individualized Plan of Care/Goals, Treatment Preferences and Shares the Quality Data Associated with the Providers?: Yes  Discharge Location  Discharge Placement: Home with home health

## 2020-12-20 NOTE — PROGRESS NOTES
Pharmacy Dosing Services: Vancomycin    Indication: None    Day of therapy: 3    Other Antimicrobials (Include dose, start day & day of therapy):  NONE  Current Antimicrobial Therapy (168h ago, onward)       Ordered     Start Stop    20 0804  VANCOMYCIN INFORMATION NOTE  Other,   ONCE      20 2300 20 1059    20 0808  vancomycin (VANCOCIN) 1,000 mg in 0.9% sodium chloride 250 mL (VIAL-MATE)  1,000 mg,   IntraVENous,   EVERY 12 HOURS      20 1200 --    20  Vancomycin: Pharmacy to Dose  Other,   RX DOSING/MONITORING      20 --                    Loading dose (date given): 2000 mg  Current Maintenance dose: 1000 mg IV every 24 hours    Goal Vancomycin Level: Vancomycin Trough: 15 - 20 mcg/mL (most infections)    Vancomycin Level (if drawn):   Recent Labs     20   VANCR 17.2         Pt Weight Weight: 78.2 kg (172 lb 6.4 oz)   Temperature Temp: 97.7 °F (36.5 °C)   Temp (72hrs), Av.2 °F (36.8 °C), Min:97.2 °F (36.2 °C), Max:101.1 °F (38.4 °C)     HR Pulse (Heart Rate): 77   BP BP: 139/83     Recent Labs     20  0255 20  0200 20  0224 20  1615   CREA 0.73 1.43* 4.45* 6.22*   BUN 29* 65* 101* 107*   WBC 6.3  --  9.6 10.1     Estimated Creatinine Clearance Estimated Creatinine Clearance: 78.4 mL/min (based on SCr of 0.73 mg/dL).   Estimated Creatinine Clearance (using IBW): 68.2 mL/min      CAPD, Hemodialysis or Renal Replacement Therapy: N/A  Renal function stable? (unstable defined as SCr increase of 0.5 mg/dL or > 50% increase from baseline, whichever is greater) (Y/N): YES     Significant Cultures:   Results       Procedure Component Value Units Date/Time    CULTURE, BLOOD [[id number]] Collected: 20 1453    Order Status: Completed Specimen: Blood Updated: 20     Special Requests: PERIPHERAL        Culture result: NO GROWTH AFTER 13 HOURS       CULTURE, BLOOD [[id number]] Collected: 20 1444    Order Status: Completed Specimen: Blood Updated: 12/20/20 0718     Special Requests: PERIPHERAL        Culture result: NO GROWTH AFTER 13 HOURS       CULTURE, URINE [[id number]] Collected: 12/17/20 1700    Order Status: Completed Specimen: Cath Urine Updated: 12/19/20 1355     Special Requests: NO SPECIAL REQUESTS        Culture result: No growth (<1,000 CFU/ML)       CULTURE, BLOOD [[id number]]  (Abnormal) Collected: 12/17/20 1640    Order Status: Completed Specimen: Blood Updated: 12/20/20 0734     Special Requests: PERIPHERAL        GRAM STAIN       GRAM POSITIVE COCCI IN CLUSTERS AEROBIC BOTTLE ANAEROBIC BOTTLE                  SMEAR CALLED TO AND CORRECTLY REPEATED BY: [address] [place] [address] IN 2200 AT 4033,14/38/75 TO ALISA YEE 7123 AT 20:08 ON 12/18/20 BY AKBRADLEY           Culture result:       POSSIBLE STAPHYLOCOCCUS SPECIES, COAGULASE NEGATIVE (CHECKING FOR 2 TYPES) GROWING IN BOTH BOTTLES                    Regimen assessment: Empirically adjusted vancomycin from 1000 mg IV q24h to q12h due to improvement in renal fxn  Maintenance dose: 1000 mg IV every 12 hours  Next scheduled level: Prior to 4th maintenance dose        * * Please consider discontinuing vancomycin since the GPC from the 1 blood cx came back as coag neg staph, which is most likely a contaminant. * * *       Pharmacy will follow daily and adjust medications as appropriate for renal function and/or serum levels.     Thank you,  Emmanuel Cardenas, [address]  Clinical Pharmacist  [phone]

## 2020-12-20 NOTE — DISCHARGE INSTRUCTIONS
Patient armband removed and shredded    DISCHARGE SUMMARY from Nurse    PATIENT INSTRUCTIONS:    After general anesthesia or intravenous sedation, for 24 hours or while taking prescription Narcotics:  · Limit your activities  · Do not drive and operate hazardous machinery  · Do not make important personal or business decisions  · Do  not drink alcoholic beverages  · If you have not urinated within 8 hours after discharge, please contact your surgeon on call. Report the following to your surgeon:  · Excessive pain, swelling, redness or odor of or around the surgical area  · Temperature over 100.5  · Nausea and vomiting lasting longer than 4 hours or if unable to take medications  · Any signs of decreased circulation or nerve impairment to extremity: change in color, persistent  numbness, tingling, coldness or increase pain  · Any questions    What to do at Home:  Recommended activity: Activity as tolerated. If you experience any of the following symptoms delirium, dementia, difficulty with coordination, please follow up with primary care provider/ED. *  Please give a list of your current medications to your Primary Care Provider. *  Please update this list whenever your medications are discontinued, doses are      changed, or new medications (including over-the-counter products) are added. *  Please carry medication information at all times in case of emergency situations. These are general instructions for a healthy lifestyle:    No smoking/ No tobacco products/ Avoid exposure to second hand smoke  Surgeon General's Warning:  Quitting smoking now greatly reduces serious risk to your health.     Obesity, smoking, and sedentary lifestyle greatly increases your risk for illness    A healthy diet, regular physical exercise & weight monitoring are important for maintaining a healthy lifestyle    You may be retaining fluid if you have a history of heart failure or if you experience any of the following symptoms:  Weight gain of 3 pounds or more overnight or 5 pounds in a week, increased swelling in our hands or feet or shortness of breath while lying flat in bed. Please call your doctor as soon as you notice any of these symptoms; do not wait until your next office visit. The discharge information has been reviewed with the patient. The patient verbalized understanding. Discharge medications reviewed with the patient and appropriate educational materials and side effects teaching were provided. ___________________________________________________________________________________________________________________________________    Patient Education        Altered Mental Status: Care Instructions  Your Care Instructions     Altered mental status is a change in how well your brain is working. As a result, you may be confused, be less alert than usual, or act in odd ways. This may include seeing or hearing things that aren't really there (hallucinations). A mental status change has many possible causes. For example, it may be the result of an infection, an imbalance of chemicals in the body, or a chronic disease such as diabetes or COPD. It can also be caused by things such as a head injury, taking certain medicines, or using alcohol or drugs. The doctor may do tests to look for the cause. These tests may include urine tests, blood tests, and imaging tests such as a CT scan. Sometimes a clear cause isn't found. But tests can help the doctor rule out a serious cause of your symptoms. A change in mental status can be scary. But mental status will often return to normal when the cause is treated. So it is important to get any follow-up testing or treatment the doctor has suggested. The doctor has checked you carefully, but problems can develop later. If you notice any problems or new symptoms, get medical treatment right away. Follow-up care is a key part of your treatment and safety.  Be sure to make and go to all appointments, and call your doctor if you are having problems. It's also a good idea to know your test results and keep a list of the medicines you take. How can you care for yourself at home? · Be safe with medicines. Take your medicines exactly as prescribed. Call your doctor if you think you are having a problem with your medicine. · Have another adult stay with you until you are better. This can help keep you safe. Ask that person to watch for signs that your mental status is getting worse. When should you call for help? Call 911 anytime you think you may need emergency care. For example, call if:    · You passed out (lost consciousness). Call your doctor now or seek immediate medical care if:    · Your mental status is getting worse.     · You have new symptoms, such as a fever, chills, or shortness of breath.     · You do not feel safe. Watch closely for changes in your health, and be sure to contact your doctor if:    · You do not get better as expected. Where can you learn more? Go to http://www.[employer].com/  Enter J452 in the search box to learn more about \"Altered Mental Status: Care Instructions. \"  Current as of: November 20, 2019               Content Version: 12.6  © 7931-3737 [employer]. Care instructions adapted under license by [employer] (which disclaims liability or warranty for this information). If you have questions about a medical condition or this instruction, always ask your healthcare professional. [address] any warranty or liability for your use of this information.

## 2020-12-20 NOTE — DISCHARGE SUMMARY
[facility]  Hospitalist Division    Discharge Summary    Patient: Minerva Hylton MRN: [id number]  CSN: [id number]    YOB: 1955  Age: 76 y.o. Sex: female    DOA: 12/17/2020 LOS:  LOS: 3 days   Discharge Date:      Admission Diagnoses: Sepsis ([address] [A41.9]  Acute renal failure (ARF) ([address] [B09.2]  Metabolic encephalopathy [W88.49]    Discharge Diagnoses:  Principal Problem:    Metabolic encephalopathy (42/43/4481)    Active Problems:    Epilepsy ([address] (12/18/2020)      Acute renal failure (ARF) ([address] (12/18/2020)      Diabetes ()      Hypertension ()      Dyslipidemia ()      Multiple wounds of skin (12/18/2020)      Hypokalemia (12/19/2020)        Discharge Condition: Stable    Discharge To: Home    Consults: None    HPI: Milana Long is a 76 y.o. female who was brought to the ER with change in her mental status. Ratna Angulo is not talking as much as she normally does.  According to the , who spoke with the ER MD, she doesn't talk a lot at baseline, but she seemed noticeably worse according to the . Janey [place] was no seizure activity witnessed. Ratna Angulo does not have cough or fever.      Hospital Course: She was started on IV fluids. 1/2 BCx from 12/17 +coag neg staph. Repeat BCx ngtd. Suspect contaminate. IV abx discontinued, and PO keflex started for abdominal wall/groin wounds. Renal function improved rapidly back to normal and patient's mental status returned to baseline. Her baseline confusion was confirmed by her  who was at bedside. He states she has a walker/cane at home and is comfortable taking her home in a cab. Home health orders placed for pt/ot and wound care. Vs and labs stable for discharge.       Physical Exam:  General appearance: alert, cooperative, no distress, appears stated age  Head: Normocephalic, without obvious abnormality, atraumatic  Lungs: clear to auscultation bilaterally  Heart: regular rate and rhythm, S1, S2 normal, no murmur, click, rub or gallop  Abdomen: soft, non-tender. Bowel sounds normal. No masses,  no organomegaly  Extremities: no cyanosis or edema  Skin: Skin color, texture normal. No rashes or lesions  Neurologic: no focal deficits, motor/sensory intact, confused      Significant Diagnostic Studies:     BMP:   Lab Results   Component Value Date/Time     (H) 12/20/2020 02:55 AM    K 3.2 (L) 12/20/2020 02:55 AM     (H) 12/20/2020 02:55 AM    CO2 20 (L) 12/20/2020 02:55 AM    AGAP 7 12/20/2020 02:55 AM     (H) 12/20/2020 02:55 AM    BUN 29 (H) 12/20/2020 02:55 AM    CREA 0.73 12/20/2020 02:55 AM    GFRAA >60 12/20/2020 02:55 AM    GFRNA >60 12/20/2020 02:55 AM     CBC:   Lab Results   Component Value Date/Time    WBC 6.3 12/20/2020 02:55 AM    HGB 12.0 12/20/2020 02:55 AM    HCT 36.9 12/20/2020 02:55 AM     12/20/2020 02:55 AM       Ct Head Wo Cont    Result Date: 12/18/2020  CT HEAD W/O CONTRAST History: Altered level of consciousness, possible stroke CT Technique: Serial axial noncontrast head CT was performed from base of skull to vertex. Coronal and sagittal reformats performed, as needed. Dose reduction technique: Automated exposure control, adjustment of the mAs and/or kVp according to the patient 's size, and iterative reconstruction techniques were used. Specifics for this study were placed by technologist staff into the patient's electronic medical record. Approximate dose, if determined, reference air kerma: Digital imaging and Communication in Medicine [DICOM] format image data are available to nonaffiliated external healthcare facilities or entities on a secure media free reciprocally searchable basis, with patient authorization, for at least a 12 month period after this study. If not available when requested, the health care system, which is responsible for storage archival and delivery, should be contacted directly. Comparison prior exam,  5/2/2020 CT Findings:  There is no appreciable interval change. BRAIN [CEREBRUM/POSTERIOR FOSSA]: Supratentorial  parenchyma appears of normal density. There is no discrete intracerebral hematoma, extra-axial collection, focal mass effect, or midline shift. Posterior fossa parenchyma appears grossly unremarkable. EXTRAAXIAL AND MENINGES: Cerebral  and cerebellar sulci and the ventricles appear minimally prominent, within normal limits in size and configuration for patient age. There is mild intracranial atherosclerosis CALVARIUM: The visible bony calvarium appears unremarkable. Hyperostosis frontalis interna. MISC:  The visible included paranasal sinuses and petrous mastoid air cells appear well developed and aerated. IMPRESSION: 1.  Stable unremarkable noncontrast head CT since 5-2020. No evidence of acute intracranial hemorrhage or focal mass effect. Ct Chest Abd Pelv Wo Cont    Result Date: 12/18/2020  CT CHEST ABDOMEN AND PELVIS W/O  CONTRAST History:  Fever unknown origin CT technique:  Serial axial helical noncontrast CT performed from the lung apex to liver dome for chest, liver dome to iliac crest for abdomen, and from iliac crest to pubis for pelvis CT. No IV contrast was administered because of severe contrast allergy, significant renal failure,  referring physician or patient request. No PO contrast was administered per physician or patient request.]  This is a potential significant limitation on solid organ, vascular and of GI tract and possibly other system evaluations. Sagittal and coronal reformats were performed from axial helical data. Dose reduction technique: Automated exposure control, adjustment of the mAs and/or kVp according to the patient 's size, and iterative reconstruction techniques were used. Specifics for this study were placed by technologist staff into the patient's electronic medical record.  Approximate dose, if determined, reference air kerma: Digital imaging and Communication in Medicine [DICOM] format image data are available to nonaffiliated external healthcare facilities or entities on a secure media free reciprocally searchable basis, with patient authorization, for at least a 12 month period after this study. If not available when requested, the health care system, which is responsible for storage archival and delivery, should be contacted directly. Comparison prior exam,  5/2/2020 CT findings: CT Chest LUNGS: Parenchyma: Mild emphysema. Mild scattered relatively symmetric subpleural reticular lines and/or haziness in the lower lung zones posteriorly bilaterally, nonspecific, but fairly typical of dependent partial atelectasis and/or scarring. There is no evident mass, consolidation, ground glass opacity, or diffuse or nodular interstitial thickening. PLEURA: No evident pleural effusion,   pneumothorax,  or pleural based  mass. AIRWAYS: Central airways are patent. Minimal secretions dependently along the right mainstem bronchus and trachea. MEDIASTINUM: There is mild aortic/coronary atherosclerosis with ascending aortic ectasia up to 3.8 cm. Pulmonary trunk is of normal caliber. No pericardial effusion. LYMPH NODES: No evident lymphadenopathy  or mass. BONES/OSSEOUS: No acute or aggressive abnormalities identified. Old healed right rib fractures. MISC: Chest wall is unremarkable, --------------------- CT Abdomen/Pelvis SOLID UPPER ABDOMINAL ORGANS: Limited by absence of IV contrast. Liver/Biliary: Unremarkable liver. No biliary ductal dilatation. Distended but otherwise grossly unremarkable gallbladder. Pancreas: Unremarkable. Spleen: Unremarkable. RETROPERITONEUM: Adrenals: Right: Unremarkable. Left: Unremarkable. Kidneys/Ureter/Bladder: Right: Unremarkable. Left: Unremarkable. No upper urinary tract dilatation. Lymph Nodes: No evident  abdominopelvic retroperitoneal lymphadenopathy  or pelvic mass.  GASTROINTESTINAL TRACT: Gastrointestinal tract is poorly evaluated,  given lack of oral and IV contrast preparation or adequate distension. Abdominal pelvic wall is unremarkable. No perihepatic ascites. No evident pneumoperitoneum. . Otherwise GI Tract appears grossly unremarkable , including appendix. Vascular: There is   mild aortoiliac atherosclerosis. Abdominal aorta is of normal caliber. No aneurysm or periaortic hematoma. Pelvic Organs: Urinary bladder appears decompressed, Baker catheter in situ with air urine level not accurately evaluated. grossly unremarkable for limited degree of distension. The gynecologic structures normal caliber for age oval heavily calcified lobulated 2 cm uterine fibroid and several adjacent smaller calcifications also likely fibroids. .  There is no evidence of adnexal mass. There is no pelvic ascites. Minimal right inguinal hernia containing fat/lipomatosis MISC: BONES/OSSEOUS: Osteopenia. Chronic intermediate grade T11, low-grade T10 and T12 anterior wedge superior endplate fracture deformities. Pelvic enthesopathy. Buttock injection calcified granulomata. Large body habitus. Mild sarcopenia/fatty marbling. IMPRESSION: 1. Distended but otherwise unremarkable appearing gallbladder, potential hydrops versus fasting. No biliary ductal dilatation, apparent wall thickening or pericholecystic fluid to suggest acute cholecystitis 2. . Resolved right pleural effusion. 3. Otherwise stable noncontrast CT abdomen pelvis since 5-2020. Ectatic atherosclerotic thoracic aorta. 4. Fibroid uterus. Baker catheter in situ. 5. Other ancillary/incidental, including osteopenia and chronic vertebral and rib fracture deformities On call report by [facility]. Xr Chest Port    Result Date: 12/18/2020  EXAM: XR CHEST PORT CLINICAL INDICATION/HISTORY: fever -Additional: None COMPARISON: 5/2/2020 TECHNIQUE: Portable frontal view of the chest _______________ FINDINGS: SUPPORT DEVICES: None. HEART AND MEDIASTINUM: Cardiomediastinal silhouette within normal limits.  LUNGS AND PLEURAL SPACES: Bibasilar atelectasis. No dense consolidation, large effusion or pneumothorax. _______________     IMPRESSION: No acute cardiopulmonary abnormality. Procedures: None    Discharge Medications:     Current Discharge Medication List      START taking these medications    Details   cephALEXin (KEFLEX) 500 mg capsule Take 1 Cap by mouth four (4) times daily for 5 days. Qty: 20 Cap, Refills: 0         CONTINUE these medications which have NOT CHANGED    Details   tocilizumab (Actemra ACTPen) 162 mg/0.9 mL injection 162 mg by SubCUTAneous route once. Every two weeks      ergocalciferol (Vitamin D2) 1,250 mcg (50,000 unit) capsule Take 50,000 Units by mouth every seven (7) days. thiamine HCL (Vitamin B-1) 100 mg tablet Take 100 mg by mouth daily. glimepiride (AMARYL) 1 mg tablet Take 1 mg by mouth Daily (before breakfast). therapeutic multivitamin (THERAGRAN) tablet Take 1 Tab by mouth daily. OXcarbazepine (TRILEPTAL) 150 mg tablet Take 150 mg by mouth two (2) times a day. vitamin E mixed/tocotrienol (VITAMIN E COMPLEX PO) Take  by mouth.      sulfaSALAzine (AZULFIDINE) 500 mg tablet Take 500 mg by mouth two (2) times a day. metFORMIN (GLUCOPHAGE) 500 mg tablet Take 500 mg by mouth two (2) times daily (with meals). Venlafaxine-ER 24 HR (EFFEXOR-ER) 150 mg tr24 tablet Take 150 mg by mouth daily. levETIRAcetam (KEPPRA) 750 mg tablet Take 2 Tabs by mouth two (2) times a day. Qty: 120 Tab, Refills: 0      CALCIUM CARB/VIT D3/MINERALS (CALCIUM-VITAMIN D PO) Take 1,200 mg by mouth daily. gabapentin (NEURONTIN) 100 mg capsule Take 600 mg by mouth three (3) times daily. chlorproMAZINE (THORAZINE) 50 mg tablet Take 25 mg by mouth two (2) times a day. venlafaxine-SR (EFFEXOR-XR) 150 mg capsule Take 75 mg by mouth nightly. OLANZapine (ZYPREXA) 5 mg tablet Take 15 mg by mouth nightly. atorvastatin (LIPITOR) 20 mg tablet Take 1 Tab by mouth nightly.   Qty: 30 Tab, Refills: 0      folic acid (FOLVITE) 1 mg tablet Take 1 mg by mouth daily. mirtazapine (REMERON) 15 mg tablet Take 15 mg by mouth nightly. docusate sodium (COLACE) 100 mg capsule Take 100 mg by mouth two (2) times a day. phenol throat spray (SORE THROAT, PHENOL,) 1.4 % spray Take 1 Spray by mouth as needed for Sore throat or pain/local anesthesia. adalimumab 40 mg/0.8 mL pnkt 40 mg by SubCUTAneous route Once every 2 weeks. Indications: RHEUMATOID ARTHRITIS      Omega-3 Fatty Acids-Fish Oil 360-1,200 mg cpDR Take 1,200 mg by mouth daily. ondansetron hcl (ZOFRAN, AS HYDROCHLORIDE,) 4 mg tablet Take 1 Tab by mouth every eight (8) hours as needed for Nausea. Qty: 30 Tab, Refills: 0      loratadine (CLARITIN) 10 mg tablet Take 10 mg by mouth daily. famotidine (PEPCID) 20 mg tablet Take 20 mg by mouth two (2) times a day. STOP taking these medications       trimethoprim-sulfamethoxazole (BACTRIM DS, SEPTRA DS) 160-800 mg per tablet Comments:   Reason for Stopping:               Activity: PT/OT per Home Health    Diet: Resume previous diet    Wound Care: Wound Care Orders lower abdomen, inner thighs, & inner groins: Cleanse wounds with wound spray, apply Opticell AG gelling fiber dressing, rope/ribbon or 4x5 rectangle, cut dressing to fit, cover with 4x4 gauze, fluffed kerlix, & ABD pads, change every 48hrs & prn soilage.     Follow-up: 1 week with PCP, wound care    Discharge time: >35 minutes    [facility]  Office:  [phone]  Pager: [phone]      12/20/2020, 12:36 PM

## 2020-12-20 NOTE — PROGRESS NOTES
Problem: Pressure Injury - Risk of 
Goal: *Prevention of pressure injury Description: Document Jerman Scale and appropriate interventions in the flowsheet. 12/19/2020 1935 by Kassidy Hurtado RN Outcome: Progressing Towards Goal 
Note: Pressure Injury Interventions: 
Sensory Interventions: Assess changes in LOC, Keep linens dry and wrinkle-free, Pressure redistribution bed/mattress (bed type) Moisture Interventions: Absorbent underpads, Apply protective barrier, creams and emollients, Internal/External urinary devices, Minimize layers Activity Interventions: Assess need for specialty bed, Pressure redistribution bed/mattress(bed type) Mobility Interventions: Pressure redistribution bed/mattress (bed type) Nutrition Interventions: Document food/fluid/supplement intake Friction and Shear Interventions: Apply protective barrier, creams and emollients, HOB 30 degrees or less, Minimize layers 12/19/2020 1855 by Kassidy Hurtado RN Outcome: Progressing Towards Goal 
Note: Pressure Injury Interventions: 
Sensory Interventions: Assess changes in LOC, Keep linens dry and wrinkle-free, Pressure redistribution bed/mattress (bed type) Moisture Interventions: Absorbent underpads, Apply protective barrier, creams and emollients, Internal/External urinary devices, Minimize layers Activity Interventions: Assess need for specialty bed, Pressure redistribution bed/mattress(bed type) Mobility Interventions: Pressure redistribution bed/mattress (bed type) Nutrition Interventions: Document food/fluid/supplement intake Friction and Shear Interventions: Apply protective barrier, creams and emollients, HOB 30 degrees or less, Minimize layers Problem: Pain Goal: *Control of Pain 12/19/2020 1935 by Kassidy Hurtado RN Outcome: Progressing Towards Goal 
12/19/2020 1855 by Kassidy Hurtado RN Outcome: Progressing Towards Goal 
Goal: *PALLIATIVE CARE:  Alleviation of Pain 12/19/2020 1935 by Sri Fernandes RN Outcome: Progressing Towards Goal 
12/19/2020 1855 by Sri Fernandes RN Outcome: Progressing Towards Goal 
  
Problem: Falls - Risk of 
Goal: *Absence of Falls Description: Document Aldo Tejada Fall Risk and appropriate interventions in the flowsheet. 12/19/2020 1935 by Sri Fernandes RN Outcome: Progressing Towards Goal 
Note: Fall Risk Interventions: 
Mobility Interventions: Bed/chair exit alarm, Communicate number of staff needed for ambulation/transfer Mentation Interventions: Adequate sleep, hydration, pain control, Bed/chair exit alarm, Door open when patient unattended, More frequent rounding, Reorient patient Medication Interventions: Bed/chair exit alarm Elimination Interventions: Bed/chair exit alarm, Call light in reach, Toileting schedule/hourly rounds History of Falls Interventions: Bed/chair exit alarm 12/19/2020 1855 by Sri Fernandes RN Outcome: Progressing Towards Goal 
Note: Fall Risk Interventions: 
Mobility Interventions: Bed/chair exit alarm, Communicate number of staff needed for ambulation/transfer Mentation Interventions: Adequate sleep, hydration, pain control, Bed/chair exit alarm, Door open when patient unattended, More frequent rounding, Reorient patient Medication Interventions: Bed/chair exit alarm Elimination Interventions: Bed/chair exit alarm, Call light in reach, Toileting schedule/hourly rounds History of Falls Interventions: Bed/chair exit alarm Problem: General Wound Care Goal: *Non-infected wound: Improvement of existing wound, absence of infection, and maintenance of skin integrity 12/19/2020 1935 by Sri Fernandes RN Outcome: Progressing Towards Goal 
12/19/2020 1855 by Sri Fernandes RN Outcome: Progressing Towards Goal 
Goal: *Infected Wound: Prevention of further infection and promotion of healing Description: Infection control procedures (eg: clean dressings, clean gloves, hand washing, precautions to isolate wound from contamination, sterile instruments used for wound debridement) should be implemented. 12/19/2020 1935 by Bay Nascimento RN Outcome: Progressing Towards Goal 
12/19/2020 1855 by Bay Nascimento RN Outcome: Progressing Towards Goal 
Goal: Interventions 12/19/2020 1935 by Bay Nascimento RN Outcome: Progressing Towards Goal 
12/19/2020 1855 by Bay Nascimento RN Outcome: Progressing Towards Goal 
  
Problem: Patient Education: Go to Patient Education Activity Goal: Patient/Family Education 12/19/2020 1935 by Bay Nascimento RN Outcome: Progressing Towards Goal 
12/19/2020 1855 by Bay Nascimento RN Outcome: Progressing Towards Goal

## 2020-12-20 NOTE — PROGRESS NOTES
0229 -- Order from [place], [place] to D/C mitch. 0915 -- Mitch D/C'd    1000 -- Patient walked to bathroom with sitter, reaching for the walk, unstable on her feet, walked back to bed with walker more steady. Voided 150 ml with medium brown, soft BM.

## 2020-12-20 NOTE — PROGRESS NOTES
Bedside shift report received from Lux Bauer RN and Lawrence Zendejas RN    9101: AOX2, on room air, with regular,nonlabored breathing; IVF infusing well; shift assessment done    2030: insisting on going home; appears angry, wants to get up; \"I'm sick and tired of just laying here\" per pt; explained rationale for rest and treatments;  called; pt given phone to speak to       2120: kept warm; assured being available for needs    2219: upset, says she is well and wants to go home;  called, talking with pt now    2250: had a large amount of soft brown stools; pericare done; pad changed; repositioned in bed; kept warm; bed alarms on    0010: prn Tylenol po given for c/o generalized pain; repositioned in bed; kept dry; due Vancomycin IV given    0100: sleeping; no visual indicators for pain noted    0230: awake, getting out of bed; assisted back to bed; constantly reoriented; Dr Kalli Bergeron informed of pt's constant attempt to get out of bed; ordered sitter; staff CNA assigned to sit with pt    [phone]: awake; sitter at bedside    0530: dressing change over wound sites done    0559: c/o soreness over wound sites on abdomen and inner thighs; prn Tylenol pop given    0645: resting in bed; reoriented constantly    0720: Bedside and Verbal shift change report given to Lux Bauer RN (oncoming nurse) by Uday Mao RN (offgoing nurse). Report included the following information SBAR, Kardex, Intake/Output, Recent Results and Cardiac Rhythm NSR.

## 2020-12-20 NOTE — PROGRESS NOTES
0720- Bedside and Verbal shift change report given to AMRIK Freeman (oncoming nurse) by Johanna Lee RN (offgoing nurse). Report included the following information SBAR, Kardex, MAR and Recent Results. 2828- Received order from [place], [place] to D/C pt mitch. Baker removed at 0915    1017- AM meds given. Pt resting in bed. No signs of distress. 1215- Pt getting prepped for discharge today.  at bedside.

## 2020-12-20 NOTE — PROGRESS NOTES
1350 -- Patient education completed with  at the bedside, dressing change education with , dressing supplies given, verbalized understanding,  called a cab, sitter took the patient downstairs. 1438 -- Cab picked patient up.

## 2020-12-20 NOTE — PROGRESS NOTES
Problem: Self Care Deficits Care Plan (Adult)  Goal: *Therapy Goal (Edit Goal, Insert Text)  Description: Occupational Therapy Goals  Initiated 12/20/2020 within 7 day(s). 1.  Patient will perform upper body dressing with modified independence. 2.  Patient will perform lower body dressing with modified independence. 3.  Patient will perform hand hygiene standing at sink with modified independence. 4.  Patient will perform toilet transfers with supervision/set-up. 5.  Patient will perform all aspects of toileting with supervision/set-up. 6.  Patient will participate in upper extremity therapeutic exercise/activities with supervision/set-up for 7 minutes. Prior Level of Function: pt is poor historian, reports she was MOD I with ADLs, used walker/cane for ambulation     Outcome: Progressing Towards Goal   OCCUPATIONAL THERAPY EVALUATION    Patient: Shruthi Beth (55 y.o. female)  Date: 12/20/2020  Primary Diagnosis: Sepsis ([facility] [address] [A41.9]  Acute renal failure (ARF) ([facility] [address] [F59.4]  Metabolic encephalopathy [D15.68]       Precautions: fall   PLOF: Pt poor historian of PLOF. Reports she was I or MOD I with ADLs and IADLS. ASSESSMENT :  Pt in bed with sitter on arrival and agreeable to therapy. Pt is oriented to person; when asked where she is, she recites her 's phone number; when given 3 choices, pt states she is in [employer]. Pt follows simple directions to move supine to EOB with supervision. Follows simple directions for hand placement to complete sit to standing with ww; after standing x5 seconds, pt with posterior LOB and returns to sitting EOB with poor eccentric control. Completes sit to stand with MIN A and ambulates with ww to bathroom, lowers onto toilet with good eccentric control and follows directions for hand placement. Difficulty following directions for walker placement and at times reaching for therapist hand without clear reasoning why.  Completes bowel and bladder hygiene with MOD I. Ambulates to bed and requires repeated cues to tolerate blood pressure cuff, see flowsheet for vitals. Hand hygiene with wipe at EOB with setup A. Follows directions to complete ROM/MMT screen, strength grossly 3/5 BUE, sensation to light touch reported to be equally intact, and coordination intact. Pt able to don L sock from EOB by crossing legs with setup A. Pt transitions back to bed and left with needs within reach and sitter present. Patient presents with decreased dynamic standing balance and difficulty following directions impacting safety with mobility, functional transfers, and ADLs in the home    Patient will benefit from skilled intervention to address the above impairments. Patient's rehabilitation potential is considered to be Fair  Factors which may influence rehabilitation potential include:   []             None noted  [x]             Mental ability/status  [x]             Medical condition  [x]             Home/family situation and support systems  [x]             Safety awareness  []             Pain tolerance/management  []             Other:      PLAN :  Recommendations and Planned Interventions:   [x]               Self Care Training                  []      Therapeutic Activities  [x]               Functional Mobility Training   [x]      Cognitive Retraining  [x]               Therapeutic Exercises           [x]      Endurance Activities  [x]               Balance Training                    []      Neuromuscular Re-Education  []               Visual/Perceptual Training     [x]      Home Safety Training  [x]               Patient Education                   [x]      Family Training/Education  []               Other (comment):    Frequency/Duration: Patient will be followed by occupational therapy 3 times a week to address goals.   Discharge Recommendations: Steve Donovan  Further Equipment Recommendations for Discharge: N/A     SUBJECTIVE:   Patient stated is that my ?  (in reference to female sitter)    OBJECTIVE DATA SUMMARY:     Past Medical History:   Diagnosis Date    Diabetes     Dyslipidemia     Hypertension     Hypertension     Noncompliance     Polysubstance abuse ([facility] [address]     ETOH,  marijuana    Psychosis ([facility] [address]     Pulmonary hypertension     RVSP 60mmHg (ECHO 6/15)    Seizure disorder      Past Surgical History:   Procedure Laterality Date    HX BREAST LUMPECTOMY       Barriers to Learning/Limitations: yes;  altered mental status (i.e.Sedation, Confusion)  Compensate with: visual, verbal, tactile, kinesthetic cues/model    Home Situation:   Home Situation  Home Environment: Apartment  # Steps to Enter: 18  Living Alone: No  Support Systems: Spouse/Significant Other/Partner  [x]  Right hand dominant   []  Left hand dominant    Pt is unable to identify tub vs walk in shower, states she does have a shower chair. Cognitive/Behavioral Status:  Orientation Level: Disoriented to place; Disoriented to situation;Oriented to person    Skin: see EMR, pt with several wounds, followed by wound care  Edema: none noted in BUE, pt does point out discoloration at R MP of thumb, but does not identify pain, no ROM restrictions    Vision/Perceptual:    Appears intact    Coordination: BUE  Intact, slow sequential thumb to finger opposition    Balance:  Static sitting balance: intact  Dynamic sitting balance: impaired, fair  Static standing balance: impaired,fair  Dynamic standing balance: Impaired, fair    Strength: BUE  Grossly 3/4 bilaterally    Tone & Sensation: BUE  No abnormal tone noted in BUE, sensation to light touch reported to be symmetrically intact    Range of Motion: BUE  WNL    Functional Mobility and Transfers for ADLs:  Bed Mobility:  MOD I with use of bedrails  Transfers:  Contact guard    ADL Assessment:   Feeding: Setup  Oral Facial Hygiene/Grooming: Setup  Bathing: Minimum assistance  Upper Body Dressing: Setup  Lower Body Dressing: Minimum assistance  Toileting: Contact guard assistance     ADL Intervention:  Pt completed functional mobility in bathroom with MIN A, unable to follow safety directives for walker placement. Completed toileting with supervision for bowel/bladder hygiene. Pain:  Pain level pre-treatment: 3/10   Pain level post-treatment: 3/10   * pt reports pain but is unable to provide a pain level or location. Does not complain or pain during mobility  Pain Intervention(s): Medication (see MAR); Rest, Ice, Repositioning   Response to intervention: Nurse notified, See doc flow    Activity Tolerance:   Fair+  Please refer to the flowsheet for vital signs taken during this treatment. After treatment:   [] Patient left in no apparent distress sitting up in chair  [] Patient left in no apparent distress in bed  [x] Call bell left within reach  [x] Nursing notified  [x] Caregiver present  [] Bed alarm activated    COMMUNICATION/EDUCATION:   [x] Role of Occupational Therapy in the acute care setting  [x] Home safety education was provided and the patient/caregiver indicated understanding. [] Patient/family have participated as able in goal setting and plan of care. [] Patient/family agree to work toward stated goals and plan of care. [] Patient understands intent and goals of therapy, but is neutral about his/her participation. [x] Patient is unable to participate in goal setting and plan of care. Thank you for this referral.  Valentina Alonso OT  Time Calculation: 27 mins    Eval Complexity: History: MEDIUM Complexity : Expanded review of history including physical, cognitive and psychosocial  history ; Examination: MEDIUM Complexity : 3-5 performance deficits relating to physical, cognitive , or psychosocial skils that result in activity limitations and / or participation restrictions; Decision Making:MEDIUM Complexity : Patient may present with comorbidities that affect occupational performnce.  Miniml to moderate modification of tasks or assistance (eg, physical or verbal ) with assesment(s) is necessary to enable patient to complete evaluation

## 2020-12-20 NOTE — PROGRESS NOTES
Problem: Pressure Injury - Risk of  Goal: *Prevention of pressure injury  Description: Document Jerman Scale and appropriate interventions in the flowsheet. Outcome: Progressing Towards Goal  Note: Pressure Injury Interventions:  Sensory Interventions: Assess changes in LOC, Keep linens dry and wrinkle-free, Minimize linen layers, Pressure redistribution bed/mattress (bed type), Turn and reposition approx. every two hours (pillows and wedges if needed)    Moisture Interventions: Absorbent underpads, Internal/External urinary devices, Minimize layers    Activity Interventions: Pressure redistribution bed/mattress(bed type)    Mobility Interventions: HOB 30 degrees or less, Pressure redistribution bed/mattress (bed type)    Nutrition Interventions: Document food/fluid/supplement intake, Offer support with meals,snacks and hydration    Friction and Shear Interventions: Apply protective barrier, creams and emollients, HOB 30 degrees or less, Minimize layers             Problem: Falls - Risk of  Goal: *Absence of Falls  Description: Document Cristal Fall Risk and appropriate interventions in the flowsheet.   Outcome: Progressing Towards Goal  Note: Fall Risk Interventions:  Mobility Interventions: Bed/chair exit alarm, Patient to call before getting OOB, Utilize walker, cane, or other assistive device    Mentation Interventions: Adequate sleep, hydration, pain control, Bed/chair exit alarm, Door open when patient unattended, More frequent rounding, Reorient patient, Toileting rounds    Medication Interventions: Bed/chair exit alarm, Evaluate medications/consider consulting pharmacy    Elimination Interventions: Bed/chair exit alarm, Call light in reach, Patient to call for help with toileting needs    History of Falls Interventions: Bed/chair exit alarm, Door open when patient unattended, Evaluate medications/consider consulting pharmacy         Problem: General Wound Care  Goal: *Non-infected wound: Improvement of existing wound, absence of infection, and maintenance of skin integrity  Outcome: Progressing Towards Goal  Goal: *Infected Wound: Prevention of further infection and promotion of healing  Description: Infection control procedures (eg: clean dressings, clean gloves, hand washing, precautions to isolate wound from contamination, sterile instruments used for wound debridement) should be implemented.   Outcome: Progressing Towards Goal  Goal: Interventions  Outcome: Progressing Towards Goal     Problem: Patient Education: Go to Patient Education Activity  Goal: Patient/Family Education  Outcome: Progressing Towards Goal   Problem: Pain  Goal: *Control of Pain  Outcome: Progressing Towards Goal

## 2020-12-21 LAB
BACTERIA SPEC CULT: ABNORMAL
GRAM STN SPEC: ABNORMAL
GRAM STN SPEC: ABNORMAL
SERVICE CMNT-IMP: ABNORMAL

## 2020-12-26 NOTE — PROGRESS NOTES
Physician Progress Note      Devon Cummings  CSN #:                  [id number]  :                       1955  ADMIT DATE:       2020 4:08 PM  [address] [place] DATE:        2020 2:38 PM  RESPONDING  PROVIDER #:        Angie VIZCARRA DO          QUERY TEXT:    Pt admitted with AMS and fever. Pt noted to have Possible Sepsis documented in H&P problem list. The discharge summary includes Sepsis as an admitting diagnosis but no further mention ot this. If possible, please document in the progress notes and discharge summary if you are evaluating and /or treating any of the following: The medical record reflects the following:  Risk Factors: DM, seizure d/o, h/o polysubstance abuse  Clinical Indicators: 72yo female presented w/ AMS, abdominal wounds noted. VS T 101.1 P 89 RR 20 /83  WBC's 10.1 75% neuts  H&P:Metabolic encephalopathy, possible sepsis--Will give one dose Levaquin as we assess for infection.  Wound care note: Wounds located on lower abdomen, both inner groins, both upper thighs. Blood and purulence reported. [facility] internal medicine physician documents less likely necrotizing fasciitis etiology & could be  Hidradenitis suppurativa  D/C summary: 1/2 BCx from  +coag neg staph. Repeat BCx ngtd. Suspect contaminate. IV abx discontinued, and PO keflex started for abdominal wall/groin wounds  Treatment: IV Ns bolus, BC, IV Vanco, IV Levaquin, PO keflex    Thank you,  Marshal Rodriguez RN, BSN, [address] [place] [address]  [phone]  Options provided:  -- Sepsis, present on admission, due to infected skin wound  -- Sepsis was ruled out  -- Other - I will add my own diagnosis  -- Disagree - Not applicable / Not valid  -- Disagree - Clinically unable to determine / Unknown  -- Refer to Clinical Documentation Reviewer    PROVIDER RESPONSE TEXT:    After further study, sepsis was ruled out for this patient.     Query created by: Tony Cutler on 2020 8:34 AM      Electronically signed by:  Chantal Chung DO 12/26/2020 3:16 PM

## 2021-06-25 NOTE — PROGRESS NOTES
Identify and practice 3 coping skills to manage anxiety Neurology Progress Note      Patient: Leilani Schafer MRN: [id number]  SSN: [id number]    YOB: 1955  Age: 57 y.o. Sex: female      Admit Date: 5/1/2017    LOS: 4 days     Subjective:     Chief Complaint:  Status epilepticus ([address]     No reported seizures. On Keppra 1500 mg bid. More awake to me today. Objective:     Vitals:    05/05/17 0111 05/05/17 0547 05/05/17 1024 05/05/17 1343   BP: 115/78 (!) 133/93 124/82 132/90   Pulse: 66 74 66 72   Resp: 18 18 20 18   Temp: 97 °F (36.1 °C) 97.2 °F (36.2 °C) 98.5 °F (36.9 °C) 98.7 °F (37.1 °C)   SpO2: 100% 100% 100% 100%   Weight:  79.8 kg (176 lb)     Height:                Physical Exam:     Awake, knew she is in the hospital in [place]. Review of systems: No reliable.     Medications Reviewed:  Current Facility-Administered Medications   Medication Dose Route Frequency    barium sulfate (E-Z-DISK) contrast tablet 700 mg  700 mg Oral RAD ONCE    barium sulfate (VARIBAR NECTAR) 40 % (w/v) contrast suspension 20 mL  20 mL Oral RAD ONCE    barium sulfate (VARIBAR THIN HONEY) 40 % (w/v) 29% (w/w) contrast suspension 20 mL  20 mL Oral RAD ONCE    haloperidol lactate (HALDOL) injection 2 mg  2 mg IntraVENous Q4H PRN    LORazepam (ATIVAN) injection 1 mg  1 mg IntraVENous Q4H PRN    levETIRAcetam (KEPPRA) tablet 1,500 mg  1,500 mg Oral BID    bacitracin 500 unit/gram packet 1 Packet  1 Packet Topical PRN    sodium chloride (NS) flush 10-30 mL  10-30 mL InterCATHeter PRN    sodium chloride (NS) flush 10 mL  10 mL InterCATHeter Q24H    sodium chloride (NS) flush 10 mL  10 mL InterCATHeter PRN    sodium chloride (NS) flush 10-40 mL  10-40 mL InterCATHeter Q8H    sodium chloride (NS) flush 20 mL  20 mL InterCATHeter Q24H    dextrose 5% - 0.45% NaCl with KCl 20 mEq/L infusion  125 mL/hr IntraVENous CONTINUOUS    insulin lispro (HUMALOG) injection   SubCUTAneous Q6H    ELECTROLYTE REPLACEMENT PROTOCOL  1 Each Other PRN    ELECTROLYTE REPLACEMENT PROTOCOL  1 Each Other PRN    ELECTROLYTE REPLACEMENT PROTOCOL  1 Each Other PRN    ELECTROLYTE REPLACEMENT PROTOCOL  1 Each Other PRN    acetaminophen (TYLENOL) suppository 325 mg  325 mg Rectal Q4H PRN    enoxaparin (LOVENOX) injection 40 mg  40 mg SubCUTAneous Q24H    pantoprazole (PROTONIX) 40 mg in sodium chloride 0.9 % 10 mL injection  40 mg IntraVENous DAILY    glucose chewable tablet 16 g  4 Tab Oral PRN    glucagon (GLUCAGEN) injection 1 mg  1 mg IntraMUSCular PRN    dextrose (D50W) injection syrg 12.5-25 g  25-50 mL IntraVENous PRN     Past Medical History:   Diagnosis Date    Diabetes     Dyslipidemia     Hypertension     Noncompliance     Polysubstance abuse     ETOH,  marijuana    Psychosis     Pulmonary hypertension     RVSP 60mmHg (ECHO 6/15)    Seizure disorder      Social History     Social History    Marital status:      Spouse name: N/A    Number of children: N/A    Years of education: N/A     Occupational History    Not on file. Social History Main Topics    Smoking status: Current Every Day Smoker     Packs/day: 0.25    Smokeless tobacco: Not on file    Alcohol use Yes      Comment: occasional beer    Drug use: Yes     Special: Marijuana, Cocaine      Comment: last used Josiah Collar 2/25/15    Sexual activity: Not on file     Other Topics Concern    Not on file     Social History Narrative       Assessment/Plan:     Principal Problem:    Status epilepticus ([facility] [address] (5/1/2017)    Active Problems:    Diabetes ()      Hypertension ()      Dyslipidemia ()      Acute respiratory failure ([facility] [address] (5/1/2017)    History of localization related epilepsy. EEG with right temporal focus. Continue Keppra 1500 mg bid. Her renal function is worsening and so will monitor for improvement and if not then her keppra dose needs to be adjusted. Today chemistry still pending.         Signed By: Barb Baumann MD     May 5, 2017

## 2022-01-18 NOTE — PERIOP NOTES
----- Message from Robina Irving sent at 1/18/2022  9:25 AM CST -----  Regarding: Orders  Contact: [phone]  Calling to request orders needed for new hooveround wheelchair. Please call and advise     Found bed bug on stretcher after pt. laid down. Endo notified.

## (undated) DEVICE — FLEX ADVANTAGE 1500CC: Brand: FLEX ADVANTAGE

## (undated) DEVICE — BASIN EMESIS 500CC ROSE 250/CS 60/PLT: Brand: MEDEGEN MEDICAL PRODUCTS, LLC

## (undated) DEVICE — KIT COLON W/ 1.1OZ LUB AND 2 END

## (undated) DEVICE — DEVICE INFL 60ML 12ATM CONVENIENT LOK REL HNDL HI PRSS FLX

## (undated) DEVICE — KENDALL 500 SERIES DIAPHORETIC FOAM MONITORING ELECTRODE - TEAR DROP SHAPE ( 30/PK): Brand: KENDALL

## (undated) DEVICE — SYR 50ML SLIP TIP NSAF LF STRL --

## (undated) DEVICE — MEDI-VAC NON-CONDUCTIVE SUCTION TUBING: Brand: CARDINAL HEALTH